# Patient Record
Sex: FEMALE | Race: WHITE | NOT HISPANIC OR LATINO | Employment: FULL TIME | URBAN - METROPOLITAN AREA
[De-identification: names, ages, dates, MRNs, and addresses within clinical notes are randomized per-mention and may not be internally consistent; named-entity substitution may affect disease eponyms.]

---

## 2017-01-10 ENCOUNTER — OFFICE VISIT (OUTPATIENT)
Dept: FAMILY MEDICINE CLINIC | Facility: CLINIC | Age: 47
End: 2017-01-10

## 2017-01-10 VITALS
BODY MASS INDEX: 21.97 KG/M2 | DIASTOLIC BLOOD PRESSURE: 80 MMHG | SYSTOLIC BLOOD PRESSURE: 130 MMHG | OXYGEN SATURATION: 100 % | HEIGHT: 63 IN | HEART RATE: 117 BPM | WEIGHT: 124 LBS

## 2017-01-10 DIAGNOSIS — R13.12 OROPHARYNGEAL DYSPHAGIA: ICD-10-CM

## 2017-01-10 DIAGNOSIS — M54.50 ACUTE RIGHT-SIDED LOW BACK PAIN WITHOUT SCIATICA: ICD-10-CM

## 2017-01-10 DIAGNOSIS — L63.9 ALOPECIA AREATA, UNSPECIFIED: Primary | ICD-10-CM

## 2017-01-10 DIAGNOSIS — G47.09 OTHER INSOMNIA: ICD-10-CM

## 2017-01-10 PROBLEM — R13.10 DIFFICULTY SWALLOWING: Status: ACTIVE | Noted: 2017-01-10

## 2017-01-10 PROCEDURE — 99214 OFFICE O/P EST MOD 30 MIN: CPT | Performed by: FAMILY MEDICINE

## 2017-01-10 NOTE — PROGRESS NOTES
Subjective   Betty Garg is a 46 y.o. female here to discuss insomnia and hair loss.    History of Present Illness   Betty was instructed by our office to contact dermatology in regards to hair loss. She followed up with Dr. Laurent last week where she was advised to get additional lab testing.    In regards to Benito's sleeping, she states she has trouble falling asleep and she wakes up throughout the night. Betty is concerned her stress levels is related to her insomnia.  She is also c/o pain in her upper back, right side. She thought it was a stone that passed  But she is still having occasional discomfort.    The following portions of the patient's history were reviewed and updated as appropriate: allergies, current medications, past medical history, past social history and problem list.    Review of Systems   Constitutional: Negative for activity change, chills, fatigue, fever and unexpected weight change.   HENT: Negative for congestion, sinus pressure, sore throat, tinnitus and trouble swallowing.    Eyes: Negative for discharge and visual disturbance.   Respiratory: Negative for cough, chest tightness, shortness of breath and wheezing.    Cardiovascular: Negative for chest pain, palpitations and leg swelling.   Gastrointestinal: Negative for abdominal distention, abdominal pain, constipation, diarrhea, nausea and vomiting.   Endocrine: Negative for cold intolerance, heat intolerance, polydipsia and polyuria.   Genitourinary: Negative for difficulty urinating, dysuria, frequency and urgency.   Musculoskeletal: Positive for back pain. Negative for arthralgias, gait problem and myalgias.   Skin: Negative for color change, rash and wound.   Neurological: Negative for dizziness, seizures, syncope, speech difficulty, weakness, light-headedness, numbness and headaches.   Hematological: Does not bruise/bleed easily.   Psychiatric/Behavioral: Positive for sleep disturbance. Negative for agitation, behavioral  problems, confusion, hallucinations, self-injury and suicidal ideas. The patient is not nervous/anxious.        Objective   Physical Exam   Constitutional: She is oriented to person, place, and time. She appears well-developed and well-nourished.   HENT:   Head: Normocephalic and atraumatic.   Right Ear: External ear normal.   Left Ear: External ear normal.   Nose: Nose normal.   Mouth/Throat: Oropharynx is clear and moist.   Eyes: Conjunctivae are normal. Pupils are equal, round, and reactive to light.   Neck: Normal range of motion. Neck supple. No JVD present. No tracheal deviation present. No thyromegaly present.   Cardiovascular: Normal rate, regular rhythm, normal heart sounds and intact distal pulses.  Exam reveals no gallop and no friction rub.    No murmur heard.  Pulmonary/Chest: Effort normal and breath sounds normal.   Abdominal: Soft. Bowel sounds are normal. She exhibits no distension. There is no tenderness.   Musculoskeletal: Normal range of motion. She exhibits no edema.   Lymphadenopathy:     She has no cervical adenopathy.   Neurological: She is alert and oriented to person, place, and time.   Skin: Skin is warm and dry. No rash noted.   Psychiatric: She has a normal mood and affect. Her behavior is normal. Judgment and thought content normal.   Nursing note and vitals reviewed.      Assessment/Plan   Betty was seen today for hair/scalp problem, back pain and insomnia.    Diagnoses and all orders for this visit:    Alopecia areata, unspecified  -     DHEA level  -     Testosterone, Free, Total  -     Thyroid Panel With TSH  -     Iron  -     Vitamin B12 and Folate  -     Ferritin  -     CBC and Differential  -     Reticulocytes  -     Vitamin D 25 Hydroxy  -     Zinc    Oropharyngeal dysphagia  She is still swallowing with difficulty. She has been evaluated at [facility] and no source was found.     Acute right-sided low back pain without sciatica  I have advised regular stretching and  aspirin cream to treat..    Other insomnia  I have advised waiting for these test results to come back and working on a regular sleep schedule.

## 2017-01-10 NOTE — MR AVS SNAPSHOT
Betty Garg   1/10/2017 1:55 PM   Office Visit    Provider:  Job Jackson MD   Department:  [facility] PRIMARY CARE   Dept Phone:  [phone]                Your Full Care Plan              Today's Medication Changes          These changes are accurate as of: 1/10/17  2:52 PM.  If you have any questions, ask your nurse or doctor.               Medication(s)that have changed:     PROBIOTIC DAILY PO   Take 1 tablet by mouth Daily.   What changed:  Another medication with the same name was removed. Continue taking this medication, and follow the directions you see here.   Changed by:  Job Jackson MD         Stop taking medication(s)listed here:     OMEPRAZOLE PO   Stopped by:  Job Jackson MD                      Your Updated Medication List          This list is accurate as of: 1/10/17  2:52 PM.  Always use your most recent med list.                aspirin 81 MG tablet       nebivolol 2.5 MG tablet   Commonly known as:  BYSTOLIC       PROBIOTIC DAILY PO               We Performed the Following     CBC and Differential     DHEA level     Ferritin     Iron     Reticulocytes     Testosterone, Free, Total     Thyroid Panel With TSH     Vitamin B12 and Folate     Vitamin D 25 Hydroxy     Zinc       You Were Diagnosed With        Codes Comments    Alopecia areata, unspecified    -  Primary ICD-10-CM: L63.9  ICD-9-CM: 704.01     Oropharyngeal dysphagia     ICD-10-CM: R13.12  ICD-9-CM: 787.22       Instructions    I will send your labs to Dr. Laurent.  Try some aspirin cream on the sore area of your back.      Patient Instructions History      [employer] Signup     [facility] [employer] allows you to send messages to your doctor, view your test results, renew your prescriptions, schedule appointments, and more. To sign up, go to [employer] and click on the Sign Up Now link in the New User? box. Enter your [employer] Activation Code exactly as it  "appears below along with the last four digits of your Social Security Number and your Date of Birth () to complete the sign-up process. If you do not sign up before the expiration date, you must request a new code.    [employer] [employer] Activation Code: [email or web address]  Expires: 2017  2:52 PM    If you have questions, you can email [email or web address] or call [phone] to talk to our [employer] staff. Remember, [employer] is NOT to be used for urgent needs. For medical emergencies, dial 911.               Other Info from Your Visit           Allergies     Penicillins  Hives      Reason for Visit     Hair/Scalp Problem Hair Loss.    Back Pain     Insomnia           Vital Signs     Blood Pressure Pulse Height Weight Oxygen Saturation Body Mass Index    130/80 (BP Location: Right arm, Patient Position: Sitting) 117 63\" (160 cm) 124 lb (56.2 kg) 100% 21.97 kg/m2    Smoking Status                   Never Smoker           Problems and Diagnoses Noted     Difficulty swallowing    Patchy loss of hair    -  Primary      "

## 2017-01-11 LAB
25(OH)D3+25(OH)D2 SERPL-MCNC: 25.6 NG/ML (ref 30–100)
BASOPHILS # BLD AUTO: 0 X10E3/UL (ref 0–0.2)
BASOPHILS NFR BLD AUTO: 0 %
EOSINOPHIL # BLD AUTO: 0.1 X10E3/UL (ref 0–0.4)
EOSINOPHIL NFR BLD AUTO: 1 %
ERYTHROCYTE [DISTWIDTH] IN BLOOD BY AUTOMATED COUNT: 12.8 % (ref 12.3–15.4)
FERRITIN SERPL-MCNC: 26 NG/ML (ref 15–150)
FOLATE SERPL-MCNC: >20 NG/ML
FT4I SERPL CALC-MCNC: 1.9 (ref 1.2–4.9)
HCT VFR BLD AUTO: 38.4 % (ref 34–46.6)
HGB BLD-MCNC: 13.2 G/DL (ref 11.1–15.9)
IMM GRANULOCYTES # BLD: 0 X10E3/UL (ref 0–0.1)
IMM GRANULOCYTES NFR BLD: 0 %
IRON SERPL-MCNC: 108 UG/DL (ref 27–159)
LYMPHOCYTES # BLD AUTO: 1.8 X10E3/UL (ref 0.7–3.1)
LYMPHOCYTES NFR BLD AUTO: 27 %
MCH RBC QN AUTO: 31.8 PG (ref 26.6–33)
MCHC RBC AUTO-ENTMCNC: 34.4 G/DL (ref 31.5–35.7)
MCV RBC AUTO: 93 FL (ref 79–97)
MONOCYTES # BLD AUTO: 0.5 X10E3/UL (ref 0.1–0.9)
MONOCYTES NFR BLD AUTO: 7 %
NEUTROPHILS # BLD AUTO: 4.4 X10E3/UL (ref 1.4–7)
NEUTROPHILS NFR BLD AUTO: 65 %
PLATELET # BLD AUTO: 307 X10E3/UL (ref 150–379)
RBC # BLD AUTO: 4.15 X10E6/UL (ref 3.77–5.28)
RETICS/RBC NFR AUTO: 1.2 % (ref 0.6–2.6)
T3RU NFR SERPL: 32 % (ref 24–39)
T4 SERPL-MCNC: 6 UG/DL (ref 4.5–12)
TSH SERPL DL<=0.005 MIU/L-ACNC: 2.54 UIU/ML (ref 0.45–4.5)
VIT B12 SERPL-MCNC: 528 PG/ML (ref 211–946)
WBC # BLD AUTO: 6.8 X10E3/UL (ref 3.4–10.8)
ZINC SERPL-MCNC: NORMAL UG/DL

## 2017-01-12 LAB
TESTOST FREE SERPL-MCNC: 3.1 PG/ML (ref 0–4.2)
TESTOST SERPL-MCNC: 21 NG/DL (ref 8–48)

## 2017-01-13 LAB — DHEA SERPL-MCNC: 468 NG/DL (ref 31–701)

## 2017-01-14 LAB — ZINC SERPL-MCNC: 67 UG/DL (ref 56–134)

## 2017-01-25 ENCOUNTER — OFFICE (OUTPATIENT)
Dept: URBAN - METROPOLITAN AREA CLINIC 75 | Facility: CLINIC | Age: 47
End: 2017-01-25

## 2017-01-25 VITALS
SYSTOLIC BLOOD PRESSURE: 120 MMHG | HEIGHT: 64 IN | DIASTOLIC BLOOD PRESSURE: 72 MMHG | WEIGHT: 124 LBS | HEART RATE: 84 BPM

## 2017-01-25 DIAGNOSIS — K21.9 GASTRO-ESOPHAGEAL REFLUX DISEASE WITHOUT ESOPHAGITIS: ICD-10-CM

## 2017-01-25 DIAGNOSIS — R07.9 CHEST PAIN, UNSPECIFIED: ICD-10-CM

## 2017-01-25 DIAGNOSIS — K58.9 IRRITABLE BOWEL SYNDROME WITHOUT DIARRHEA: ICD-10-CM

## 2017-01-25 DIAGNOSIS — K22.4 DYSKINESIA OF ESOPHAGUS: ICD-10-CM

## 2017-01-25 DIAGNOSIS — Z83.71 FAMILY HISTORY OF COLONIC POLYPS: ICD-10-CM

## 2017-01-25 DIAGNOSIS — L65.9 NONSCARRING HAIR LOSS, UNSPECIFIED: ICD-10-CM

## 2017-01-25 DIAGNOSIS — R13.10 DYSPHAGIA, UNSPECIFIED: ICD-10-CM

## 2017-01-25 PROCEDURE — 99214 OFFICE O/P EST MOD 30 MIN: CPT | Performed by: INTERNAL MEDICINE

## 2017-01-25 RX ORDER — SUCRALFATE 1 G/10ML
SUSPENSION ORAL
Qty: 560 | Refills: 1 | Status: COMPLETED
End: 2018-01-17

## 2017-01-25 RX ORDER — HYOSCYAMINE SULFATE 0.12 MG/1
TABLET ORAL; SUBLINGUAL
Qty: 60 | Refills: 11 | Status: COMPLETED
Start: 2017-01-25 | End: 2018-01-17

## 2017-03-14 ENCOUNTER — TELEPHONE (OUTPATIENT)
Dept: FAMILY MEDICINE CLINIC | Facility: CLINIC | Age: 47
End: 2017-03-14

## 2017-03-14 NOTE — TELEPHONE ENCOUNTER
Betty left a VM stating she F/U as directed with dermatology as directed by our office for hair loss. Dermatology determined she was Vit.D deficient; her level was 25.6 [L] - she has been taking Vit.D 2000IU for 6wks. with no improvement. Dermatology didn't give her any recommendations or cause of her hair loss. Patient wants to know if you have any recommendations. Please advise.

## 2017-03-14 NOTE — TELEPHONE ENCOUNTER
I really don't except that I doubt that Vit D deficiency of that level would be the problem.But if it is the problem, it will take several months before she will see any improvement. I would encourage her to try over the counter products that contain Rogaine to see if that will help.

## 2017-03-14 NOTE — TELEPHONE ENCOUNTER
Patient informed with details below. I discussed in length with her that we cannot guarantee that her symptoms would improve in 6MO. Patient verbalized understanding.

## 2017-05-25 ENCOUNTER — OFFICE (OUTPATIENT)
Dept: URBAN - METROPOLITAN AREA CLINIC 75 | Facility: CLINIC | Age: 47
End: 2017-05-25

## 2017-05-25 VITALS
HEIGHT: 64 IN | SYSTOLIC BLOOD PRESSURE: 122 MMHG | HEART RATE: 92 BPM | DIASTOLIC BLOOD PRESSURE: 66 MMHG | WEIGHT: 123 LBS

## 2017-05-25 DIAGNOSIS — K58.9 IRRITABLE BOWEL SYNDROME WITHOUT DIARRHEA: ICD-10-CM

## 2017-05-25 DIAGNOSIS — R13.10 DYSPHAGIA, UNSPECIFIED: ICD-10-CM

## 2017-05-25 DIAGNOSIS — R07.9 CHEST PAIN, UNSPECIFIED: ICD-10-CM

## 2017-05-25 DIAGNOSIS — K21.9 GASTRO-ESOPHAGEAL REFLUX DISEASE WITHOUT ESOPHAGITIS: ICD-10-CM

## 2017-05-25 DIAGNOSIS — Z83.71 FAMILY HISTORY OF COLONIC POLYPS: ICD-10-CM

## 2017-05-25 DIAGNOSIS — K22.4 DYSKINESIA OF ESOPHAGUS: ICD-10-CM

## 2017-05-25 PROCEDURE — 99214 OFFICE O/P EST MOD 30 MIN: CPT | Performed by: INTERNAL MEDICINE

## 2017-07-24 DIAGNOSIS — Z00.00 ROUTINE GENERAL MEDICAL EXAMINATION AT A HEALTH CARE FACILITY: Primary | ICD-10-CM

## 2017-07-24 DIAGNOSIS — Z13.220 SCREENING FOR LIPOID DISORDERS: ICD-10-CM

## 2017-07-31 LAB
ALBUMIN SERPL-MCNC: 4.2 G/DL (ref 3.5–5.2)
ALBUMIN/GLOB SERPL: 1.7 G/DL
ALP SERPL-CCNC: 54 U/L (ref 39–117)
ALT SERPL-CCNC: 14 U/L (ref 1–33)
AST SERPL-CCNC: 13 U/L (ref 1–32)
BILIRUB SERPL-MCNC: 0.4 MG/DL (ref 0.1–1.2)
BUN SERPL-MCNC: 8 MG/DL (ref 6–20)
BUN/CREAT SERPL: 11.1 (ref 7–25)
CALCIUM SERPL-MCNC: 9.2 MG/DL (ref 8.6–10.5)
CHLORIDE SERPL-SCNC: 104 MMOL/L (ref 98–107)
CHOLEST SERPL-MCNC: 130 MG/DL (ref 0–200)
CO2 SERPL-SCNC: 22.8 MMOL/L (ref 22–29)
CREAT SERPL-MCNC: 0.72 MG/DL (ref 0.57–1)
ERYTHROCYTE [DISTWIDTH] IN BLOOD BY AUTOMATED COUNT: 12.2 % (ref 11.7–13)
GLOBULIN SER CALC-MCNC: 2.5 GM/DL
GLUCOSE SERPL-MCNC: 105 MG/DL (ref 65–99)
HCT VFR BLD AUTO: 39 % (ref 35.6–45.5)
HDLC SERPL-MCNC: 50 MG/DL (ref 40–60)
HGB BLD-MCNC: 13.3 G/DL (ref 11.9–15.5)
LDLC SERPL CALC-MCNC: 71 MG/DL (ref 0–100)
LDLC/HDLC SERPL: 1.42 {RATIO}
MCH RBC QN AUTO: 33 PG (ref 26.9–32)
MCHC RBC AUTO-ENTMCNC: 34.1 G/DL (ref 32.4–36.3)
MCV RBC AUTO: 96.8 FL (ref 80.5–98.2)
PLATELET # BLD AUTO: 281 10*3/MM3 (ref 140–500)
POTASSIUM SERPL-SCNC: 4.1 MMOL/L (ref 3.5–5.2)
PROT SERPL-MCNC: 6.7 G/DL (ref 6–8.5)
RBC # BLD AUTO: 4.03 10*6/MM3 (ref 3.9–5.2)
SODIUM SERPL-SCNC: 141 MMOL/L (ref 136–145)
TRIGL SERPL-MCNC: 44 MG/DL (ref 0–150)
VLDLC SERPL CALC-MCNC: 8.8 MG/DL (ref 5–40)
WBC # BLD AUTO: 4.62 10*3/MM3 (ref 4.5–10.7)

## 2017-08-03 ENCOUNTER — OFFICE VISIT (OUTPATIENT)
Dept: FAMILY MEDICINE CLINIC | Facility: CLINIC | Age: 47
End: 2017-08-03

## 2017-08-03 VITALS
HEART RATE: 78 BPM | HEIGHT: 63 IN | SYSTOLIC BLOOD PRESSURE: 115 MMHG | BODY MASS INDEX: 22.15 KG/M2 | OXYGEN SATURATION: 98 % | WEIGHT: 125 LBS | DIASTOLIC BLOOD PRESSURE: 70 MMHG

## 2017-08-03 DIAGNOSIS — Z00.00 ROUTINE GENERAL MEDICAL EXAMINATION AT A HEALTH CARE FACILITY: Primary | ICD-10-CM

## 2017-08-03 DIAGNOSIS — R13.11 ORAL PHASE DYSPHAGIA: ICD-10-CM

## 2017-08-03 PROCEDURE — 99396 PREV VISIT EST AGE 40-64: CPT | Performed by: FAMILY MEDICINE

## 2017-08-03 RX ORDER — MELATONIN
5000 DAILY
COMMUNITY
End: 2021-06-16 | Stop reason: SDUPTHER

## 2017-08-03 RX ORDER — METRONIDAZOLE 500 MG/1
TABLET ORAL
COMMUNITY
Start: 2017-07-28 | End: 2018-07-12

## 2017-08-03 RX ORDER — CIPROFLOXACIN 500 MG/1
TABLET, FILM COATED ORAL
COMMUNITY
Start: 2017-07-28 | End: 2018-07-12

## 2017-08-03 RX ORDER — ANTIARTHRITIC COMBINATION NO.2 900 MG
1 TABLET ORAL DAILY
COMMUNITY
End: 2019-08-01

## 2017-08-03 NOTE — PROGRESS NOTES
"Preventive Exam    History of Present Illness: Betty is here for check up and review of routine health maintenance. She states she is doing well and has concerns with hair loss.  She has been fully evaluated by a  Dermatology and was not dxd with any condition that causes this.    REVIEW OF SYSTEMS  Constitutional: Negative.    HENT: Negative.    Eyes: Negative.    Respiratory: Negative.    Cardiovascular: Negative.    Gastrointestinal: Negative.    Endocrine: Negative.    Genitourinary: Negative.    Musculoskeletal: Negative.  Skin: Negative.    Allergic/Immunologic: Negative.    Neurological: Negative.    Hematological: Negative.    Psychiatric/Behavioral: Negative.    All other systems reviewed and are negative.          PHYSICAL EXAM    Vitals:    08/03/17 1345   BP: 115/70   BP Location: Right arm   Patient Position: Sitting   Pulse: 78   SpO2: 98%   Weight: 125 lb (56.7 kg)   Height: 63\" (160 cm)     GENERAL: alert and oriented, afebrile and vital signs stable  HEENT: oral mucosa moist, PEERLA, EOM, conjunctiva normal  No cervical adenopathy  LUNGS: clear to ascultation bilaterally, no rales, ronchi or wheezing  HEART: RRR S1 S2 without murmers, thrills, rubs or gallops  CHEST WALL: within normal limits, no tenderness  ABDOMEN: WNL. Normal BS.  EXTREMITIES: No clubbing, cyanosis or edema noted. Normal Pulses.  SKIN: warm, dry, no rashes noted  NEURO: CN II- XII grossly intact    ASSESSMENT AND PLAN  Problem List Items Addressed This Visit        Digestive    Difficulty swallowing  Improved but still occasionally a problem.      Other Visit Diagnoses     Routine general medical examination at a health care facility    -  Primary        Routine health maintenance reviewed and discussed with Betty.  Labs reviewed and on chart.  I have advised her that hair loss is often a condition that is caused by stress and she has had some difficult health issues that are most likely the source of this.   .No orders of " the defined types were placed in this encounter.    Return in about 1 year (around 8/3/2018) for Annual physical.

## 2017-08-03 NOTE — PATIENT INSTRUCTIONS
Annual Wellness  Personal Prevention Plan of Service     Date of Office Visit:  2017  Encounter Provider:  Job Jackson MD  Place of Service:  [facility] PRIMARY CARE  Patient Name: Betty Garg  :  1970    As part of the Annual Wellness portion of your visit today, we are providing you with this personalized preventive plan of services (PPPS). This plan is based upon recommendations of the [place] Preventive Services Task Force (USPSTF) and the Advisory Committee on Immunization Practices (ACIP).    This lists the preventive care services that should be considered, and provides dates of when you are due. Items listed as completed are up-to-date and do not require any further intervention.    Health Maintenance   Topic Date Due   • INFLUENZA VACCINE  2017   • PAP SMEAR  2019   • TDAP/TD VACCINES (2 - Td) 2025       No orders of the defined types were placed in this encounter.      Return in about 1 year (around 8/3/2018) for Annual physical.

## 2017-08-30 ENCOUNTER — OFFICE (OUTPATIENT)
Dept: URBAN - METROPOLITAN AREA CLINIC 75 | Facility: CLINIC | Age: 47
End: 2017-08-30

## 2017-08-30 VITALS — SYSTOLIC BLOOD PRESSURE: 112 MMHG | DIASTOLIC BLOOD PRESSURE: 66 MMHG | HEIGHT: 64 IN | WEIGHT: 122 LBS

## 2017-08-30 DIAGNOSIS — K21.9 GASTRO-ESOPHAGEAL REFLUX DISEASE WITHOUT ESOPHAGITIS: ICD-10-CM

## 2017-08-30 DIAGNOSIS — R19.4 CHANGE IN BOWEL HABIT: ICD-10-CM

## 2017-08-30 DIAGNOSIS — K22.4 DYSKINESIA OF ESOPHAGUS: ICD-10-CM

## 2017-08-30 DIAGNOSIS — R07.9 CHEST PAIN, UNSPECIFIED: ICD-10-CM

## 2017-08-30 DIAGNOSIS — Z83.71 FAMILY HISTORY OF COLONIC POLYPS: ICD-10-CM

## 2017-08-30 DIAGNOSIS — K58.9 IRRITABLE BOWEL SYNDROME WITHOUT DIARRHEA: ICD-10-CM

## 2017-08-30 PROCEDURE — 99214 OFFICE O/P EST MOD 30 MIN: CPT | Performed by: INTERNAL MEDICINE

## 2017-11-15 ENCOUNTER — CLINICAL SUPPORT (OUTPATIENT)
Dept: FAMILY MEDICINE CLINIC | Facility: CLINIC | Age: 47
End: 2017-11-15

## 2017-11-15 DIAGNOSIS — Z23 NEED FOR VACCINATION: Primary | ICD-10-CM

## 2017-11-15 PROCEDURE — 90471 IMMUNIZATION ADMIN: CPT | Performed by: FAMILY MEDICINE

## 2017-11-15 PROCEDURE — 90686 IIV4 VACC NO PRSV 0.5 ML IM: CPT | Performed by: FAMILY MEDICINE

## 2018-01-17 ENCOUNTER — OFFICE (OUTPATIENT)
Dept: URBAN - METROPOLITAN AREA CLINIC 75 | Facility: CLINIC | Age: 48
End: 2018-01-17

## 2018-01-17 VITALS
HEIGHT: 64 IN | HEART RATE: 74 BPM | WEIGHT: 125 LBS | DIASTOLIC BLOOD PRESSURE: 74 MMHG | SYSTOLIC BLOOD PRESSURE: 122 MMHG

## 2018-01-17 DIAGNOSIS — K21.9 GASTRO-ESOPHAGEAL REFLUX DISEASE WITHOUT ESOPHAGITIS: ICD-10-CM

## 2018-01-17 DIAGNOSIS — K57.92 DIVERTICULITIS OF INTESTINE, PART UNSPECIFIED, WITHOUT PERFO: ICD-10-CM

## 2018-01-17 DIAGNOSIS — K58.9 IRRITABLE BOWEL SYNDROME WITHOUT DIARRHEA: ICD-10-CM

## 2018-01-17 PROCEDURE — 99214 OFFICE O/P EST MOD 30 MIN: CPT | Performed by: INTERNAL MEDICINE

## 2018-01-17 RX ORDER — METRONIDAZOLE 500 MG/1
TABLET, FILM COATED ORAL
Qty: 21 | Refills: 0 | Status: COMPLETED
Start: 2017-07-28 | End: 2018-08-16

## 2018-01-17 RX ORDER — CIPROFLOXACIN 500 MG/1
1000 TABLET, FILM COATED ORAL
Qty: 14 | Refills: 0 | Status: COMPLETED
Start: 2017-07-28 | End: 2018-08-16

## 2018-07-12 ENCOUNTER — OFFICE VISIT (OUTPATIENT)
Dept: FAMILY MEDICINE CLINIC | Facility: CLINIC | Age: 48
End: 2018-07-12

## 2018-07-12 VITALS
BODY MASS INDEX: 22.5 KG/M2 | HEART RATE: 84 BPM | WEIGHT: 127 LBS | OXYGEN SATURATION: 99 % | DIASTOLIC BLOOD PRESSURE: 62 MMHG | SYSTOLIC BLOOD PRESSURE: 92 MMHG | HEIGHT: 63 IN

## 2018-07-12 DIAGNOSIS — G89.29 CHRONIC LEFT SI JOINT PAIN: Primary | ICD-10-CM

## 2018-07-12 DIAGNOSIS — M53.3 CHRONIC LEFT SI JOINT PAIN: Primary | ICD-10-CM

## 2018-07-12 PROCEDURE — 99213 OFFICE O/P EST LOW 20 MIN: CPT | Performed by: NURSE PRACTITIONER

## 2018-07-12 RX ORDER — BACLOFEN 10 MG/1
10 TABLET ORAL 3 TIMES DAILY
Qty: 30 TABLET | Refills: 0 | Status: SHIPPED | OUTPATIENT
Start: 2018-07-12 | End: 2018-08-21

## 2018-07-12 NOTE — PROGRESS NOTES
"Betty Garg is a 47 y.o. female.Patient states that she has had intermittent lower left back pain for 4 months. Pain decrease with movement. Pain is dull. She has tried Ibuprofen and a heating pad with no relief. NKI. In the last 6 months she has noticed an increase in her spine cracking.       Assessment/Plan   Problem List Items Addressed This Visit     None      Visit Diagnoses     Chronic left SI joint pain    -  Primary             No Follow-up on file.  There are no Patient Instructions on file for this visit.    Chief Complaint   Patient presents with   • Back Pain     Social History   Substance Use Topics   • Smoking status: Never Smoker   • Smokeless tobacco: Never Used   • Alcohol use Yes      Comment: Socially.       History of Present Illness     The following portions of the patient's history were reviewed and updated as appropriate:PMHroutine: Social history , Allergies, Current Medications, Active Problem List and Health Maintenance    Review of Systems   Musculoskeletal: Positive for back pain.       Objective   Vitals:    07/12/18 0752   BP: 92/62   Pulse: 84   SpO2: 99%   Weight: 57.6 kg (127 lb)   Height: 160 cm (63\")     Body mass index is 22.5 kg/m².  Physical Exam   Constitutional: She appears well-developed and well-nourished. No distress.   HENT:   Head: Normocephalic and atraumatic.   Musculoskeletal: Normal range of motion. She exhibits tenderness.   Tenderness to left SI joint   Neurological: No cranial nerve deficit or sensory deficit. GCS eye subscore is 4. GCS verbal subscore is 5. GCS motor subscore is 6.   Reflex Scores:       Tricep reflexes are 2+ on the right side and 2+ on the left side.       Bicep reflexes are 2+ on the right side and 2+ on the left side.       Brachioradialis reflexes are 2+ on the right side and 2+ on the left side.       Patellar reflexes are 2+ on the right side and 2+ on the left side.       Achilles reflexes are 2+ on the right side and 2+ on the left " side.  Skin: Skin is warm.   Nursing note and vitals reviewed.    Reviewed Data:  No visits with results within 1 Month(s) from this visit.   Latest known visit with results is:   Orders Only on 07/24/2017   Component Date Value Ref Range Status   • Total Cholesterol 07/31/2017 130  0 - 200 mg/dL Final   • Triglycerides 07/31/2017 44  0 - 150 mg/dL Final   • HDL Cholesterol 07/31/2017 50  40 - 60 mg/dL Final   • VLDL Cholesterol 07/31/2017 8.8  5 - 40 mg/dL Final   • LDL Cholesterol  07/31/2017 71  0 - 100 mg/dL Final   • LDL/HDL Ratio 07/31/2017 1.42   Final   • Glucose 07/31/2017 105* 65 - 99 mg/dL Final   • BUN 07/31/2017 8  6 - 20 mg/dL Final   • Creatinine 07/31/2017 0.72  0.57 - 1.00 mg/dL Final   • eGFR Non  Am 07/31/2017 87  >60 mL/min/1.73 Final   • eGFR African Am 07/31/2017 106  >60 mL/min/1.73 Final   • BUN/Creatinine Ratio 07/31/2017 11.1  7.0 - 25.0 Final   • Sodium 07/31/2017 141  136 - 145 mmol/L Final   • Potassium 07/31/2017 4.1  3.5 - 5.2 mmol/L Final   • Chloride 07/31/2017 104  98 - 107 mmol/L Final   • Total CO2 07/31/2017 22.8  22.0 - 29.0 mmol/L Final   • Calcium 07/31/2017 9.2  8.6 - 10.5 mg/dL Final   • Total Protein 07/31/2017 6.7  6.0 - 8.5 g/dL Final   • Albumin 07/31/2017 4.20  3.50 - 5.20 g/dL Final   • Globulin 07/31/2017 2.5  gm/dL Final   • A/G Ratio 07/31/2017 1.7  g/dL Final   • Total Bilirubin 07/31/2017 0.4  0.1 - 1.2 mg/dL Final   • Alkaline Phosphatase 07/31/2017 54  39 - 117 U/L Final   • AST (SGOT) 07/31/2017 13  1 - 32 U/L Final   • ALT (SGPT) 07/31/2017 14  1 - 33 U/L Final   • WBC 07/31/2017 4.62  4.50 - 10.70 10*3/mm3 Final   • RBC 07/31/2017 4.03  3.90 - 5.20 10*6/mm3 Final   • Hemoglobin 07/31/2017 13.3  11.9 - 15.5 g/dL Final   • Hematocrit 07/31/2017 39.0  35.6 - 45.5 % Final   • MCV 07/31/2017 96.8  80.5 - 98.2 fL Final   • MCH 07/31/2017 33.0* 26.9 - 32.0 pg Final   • MCHC 07/31/2017 34.1  32.4 - 36.3 g/dL Final   • RDW 07/31/2017 12.2  11.7 - 13.0 % Final    • Platelets 07/31/2017 281  140 - 500 10*3/mm3 Final     Return for follow up with Dr. Jackson in a month.

## 2018-08-16 ENCOUNTER — OFFICE (OUTPATIENT)
Dept: URBAN - METROPOLITAN AREA CLINIC 75 | Facility: CLINIC | Age: 48
End: 2018-08-16
Payer: COMMERCIAL

## 2018-08-16 VITALS
SYSTOLIC BLOOD PRESSURE: 106 MMHG | HEART RATE: 72 BPM | RESPIRATION RATE: 14 BRPM | DIASTOLIC BLOOD PRESSURE: 64 MMHG | WEIGHT: 129 LBS | HEIGHT: 64 IN

## 2018-08-16 DIAGNOSIS — R10.32 LEFT LOWER QUADRANT PAIN: ICD-10-CM

## 2018-08-16 PROCEDURE — 99214 OFFICE O/P EST MOD 30 MIN: CPT

## 2018-08-16 RX ORDER — FAMOTIDINE 40 MG/1
TABLET, FILM COATED ORAL
Qty: 30 | Refills: 10 | Status: COMPLETED
Start: 2018-08-16 | End: 2019-08-29

## 2018-08-21 ENCOUNTER — OFFICE VISIT (OUTPATIENT)
Dept: FAMILY MEDICINE CLINIC | Facility: CLINIC | Age: 48
End: 2018-08-21

## 2018-08-21 VITALS
BODY MASS INDEX: 22.68 KG/M2 | DIASTOLIC BLOOD PRESSURE: 68 MMHG | OXYGEN SATURATION: 99 % | HEART RATE: 68 BPM | HEIGHT: 63 IN | SYSTOLIC BLOOD PRESSURE: 100 MMHG | WEIGHT: 128 LBS

## 2018-08-21 DIAGNOSIS — Z00.00 PHYSICAL EXAM: Primary | ICD-10-CM

## 2018-08-21 DIAGNOSIS — M53.3 CHRONIC SI JOINT PAIN: ICD-10-CM

## 2018-08-21 DIAGNOSIS — G89.29 CHRONIC SI JOINT PAIN: ICD-10-CM

## 2018-08-21 PROCEDURE — 99396 PREV VISIT EST AGE 40-64: CPT | Performed by: NURSE PRACTITIONER

## 2018-08-21 RX ORDER — ROSUVASTATIN CALCIUM 10 MG/1
TABLET, COATED ORAL
COMMUNITY
Start: 2018-08-06 | End: 2019-02-04 | Stop reason: DRUGHIGH

## 2018-08-21 NOTE — PROGRESS NOTES
Subjective   Betty Garg is a 48 y.o. female.   Here for her annual physical  PMHX:FHX of heart disease has mild plaque followed by Dr Jiang, has had bouts A fib  Hair loss and is followed by Dr Latham normal thyroid but is supposed to be taking synthroid  PSHX: colon resection  PFHX:reviewed in chart and with pt.  Was seen about 6 weeks ago for left SI joint pain and pt was ordered but the order never went through. Will reorder today      History of Present Illness none    The following portions of the patient's history were reviewed and updated as appropriate: allergies, current medications, past family history, past medical history, past social history, past surgical history and problem list.    Review of Systems   Constitutional: Positive for fatigue. Negative for activity change and appetite change.   Gastrointestinal: Positive for abdominal pain.       Objective   Physical Exam   Constitutional: She is oriented to person, place, and time. She appears well-developed and well-nourished. No distress.   HENT:   Head: Normocephalic and atraumatic.   Right Ear: External ear normal.   Left Ear: External ear normal.   Mouth/Throat: Oropharynx is clear and moist.   Eyes: EOM are normal.   Neck: Neck supple. No thyromegaly present.   Cardiovascular: Normal rate and regular rhythm.    Pulmonary/Chest: Effort normal and breath sounds normal.   Abdominal: Soft. Bowel sounds are normal.   Musculoskeletal: Normal range of motion.   Neurological: She is alert and oriented to person, place, and time. She displays normal reflexes.   Nursing note and vitals reviewed.        Assessment/Plan   Betty was seen today for annual exam.    Diagnoses and all orders for this visit:    Physical exam  -     Comprehensive Metabolic Panel  -     Lipid Panel With / Chol / HDL Ratio    Chronic SI joint pain  -     Ambulatory Referral to Physical Therapy Evaluate and treat

## 2018-08-22 LAB
ALBUMIN SERPL-MCNC: 4.7 G/DL (ref 3.5–5.5)
ALBUMIN/GLOB SERPL: 2 {RATIO} (ref 1.2–2.2)
ALP SERPL-CCNC: 55 IU/L (ref 39–117)
ALT SERPL-CCNC: 31 IU/L (ref 0–32)
AST SERPL-CCNC: 26 IU/L (ref 0–40)
BILIRUB SERPL-MCNC: 0.5 MG/DL (ref 0–1.2)
BUN SERPL-MCNC: 10 MG/DL (ref 6–24)
BUN/CREAT SERPL: 15 (ref 9–23)
CALCIUM SERPL-MCNC: 9.4 MG/DL (ref 8.7–10.2)
CHLORIDE SERPL-SCNC: 101 MMOL/L (ref 96–106)
CHOLEST SERPL-MCNC: 119 MG/DL (ref 100–199)
CHOLEST/HDLC SERPL: 2 RATIO (ref 0–4.4)
CO2 SERPL-SCNC: 21 MMOL/L (ref 20–29)
CREAT SERPL-MCNC: 0.67 MG/DL (ref 0.57–1)
GLOBULIN SER CALC-MCNC: 2.3 G/DL (ref 1.5–4.5)
GLUCOSE SERPL-MCNC: 89 MG/DL (ref 65–99)
HDLC SERPL-MCNC: 60 MG/DL
LDLC SERPL CALC-MCNC: 48 MG/DL (ref 0–99)
POTASSIUM SERPL-SCNC: 3.9 MMOL/L (ref 3.5–5.2)
PROT SERPL-MCNC: 7 G/DL (ref 6–8.5)
SODIUM SERPL-SCNC: 140 MMOL/L (ref 134–144)
TRIGL SERPL-MCNC: 56 MG/DL (ref 0–149)
VLDLC SERPL CALC-MCNC: 11 MG/DL (ref 5–40)

## 2018-11-29 ENCOUNTER — OFFICE (OUTPATIENT)
Dept: URBAN - METROPOLITAN AREA CLINIC 75 | Facility: CLINIC | Age: 48
End: 2018-11-29

## 2018-11-29 VITALS
HEIGHT: 64 IN | SYSTOLIC BLOOD PRESSURE: 128 MMHG | DIASTOLIC BLOOD PRESSURE: 70 MMHG | WEIGHT: 126 LBS | HEART RATE: 72 BPM

## 2018-11-29 DIAGNOSIS — Z83.71 FAMILY HISTORY OF COLONIC POLYPS: ICD-10-CM

## 2018-11-29 DIAGNOSIS — K59.00 CONSTIPATION, UNSPECIFIED: ICD-10-CM

## 2018-11-29 DIAGNOSIS — R10.32 LEFT LOWER QUADRANT PAIN: ICD-10-CM

## 2018-11-29 PROCEDURE — 99214 OFFICE O/P EST MOD 30 MIN: CPT | Performed by: INTERNAL MEDICINE

## 2018-11-29 RX ORDER — HYOSCYAMINE SULFATE 0.12 MG/1
TABLET ORAL
Qty: 60 | Refills: 10 | Status: COMPLETED
Start: 2018-11-29 | End: 2019-08-29

## 2018-12-20 ENCOUNTER — OFFICE VISIT (OUTPATIENT)
Dept: FAMILY MEDICINE CLINIC | Facility: CLINIC | Age: 48
End: 2018-12-20

## 2018-12-20 VITALS
DIASTOLIC BLOOD PRESSURE: 70 MMHG | OXYGEN SATURATION: 99 % | HEIGHT: 63 IN | HEART RATE: 90 BPM | BODY MASS INDEX: 22.15 KG/M2 | RESPIRATION RATE: 16 BRPM | SYSTOLIC BLOOD PRESSURE: 108 MMHG | WEIGHT: 125 LBS

## 2018-12-20 DIAGNOSIS — Z23 NEED FOR VACCINATION: ICD-10-CM

## 2018-12-20 DIAGNOSIS — G89.29 HIP PAIN, CHRONIC, LEFT: Primary | ICD-10-CM

## 2018-12-20 DIAGNOSIS — M25.552 HIP PAIN, CHRONIC, LEFT: Primary | ICD-10-CM

## 2018-12-20 PROCEDURE — 90632 HEPA VACCINE ADULT IM: CPT | Performed by: FAMILY MEDICINE

## 2018-12-20 PROCEDURE — 90471 IMMUNIZATION ADMIN: CPT | Performed by: FAMILY MEDICINE

## 2018-12-20 PROCEDURE — 99213 OFFICE O/P EST LOW 20 MIN: CPT | Performed by: FAMILY MEDICINE

## 2018-12-20 NOTE — PROGRESS NOTES
Subjective   Betty Garg is a 48 y.o. female.     History of Present Illness   Betty is here today with c/c of LT side hip pain. Patient states that this has been going on for about 8 months and worse within the last month. Tried physical therapy that made it worse at times. Heating pad with no relief. Tried abstaining from exercise with no relief.   It does not hurt at night when she sleeps. It hurts only when she walks    The following portions of the patient's history were reviewed and updated as appropriate: allergies, current medications, past medical history, past social history, past surgical history and problem list.    Review of Systems   Constitutional: Negative for activity change, appetite change, chills, fatigue, fever and unexpected weight change.   HENT: Negative for congestion, ear pain, hearing loss, nosebleeds, rhinorrhea and sore throat.    Eyes: Negative for pain, redness and visual disturbance.   Respiratory: Negative for cough, shortness of breath and wheezing.    Cardiovascular: Negative for chest pain, palpitations and leg swelling.   Gastrointestinal: Negative for abdominal pain, blood in stool, constipation, diarrhea, nausea and vomiting.   Endocrine: Negative for cold intolerance and heat intolerance.   Genitourinary: Negative for difficulty urinating, dysuria, frequency, hematuria, pelvic pain, urgency and vaginal discharge.   Musculoskeletal: Negative for arthralgias, back pain and joint swelling.   Skin: Negative for rash and wound.   Neurological: Negative for dizziness, weakness, numbness and headaches.   Hematological: Does not bruise/bleed easily.   Psychiatric/Behavioral: Negative for dysphoric mood, sleep disturbance and suicidal ideas. The patient is not nervous/anxious.        Objective   Physical Exam   Constitutional: She appears well-developed.   Cardiovascular: Normal rate.   Pulmonary/Chest: Effort normal.   Musculoskeletal:   Pain with elevation and ambulation of  left hip into hip.   Nursing note and vitals reviewed.      Assessment/Plan   Betty was seen today for hip pain.    Diagnoses and all orders for this visit:    Hip pain, chronic, left  She has been to PT and reviewed with surgeon. This is causing pain with ambulation. I have referred her to ortho for evaluation.  -     Ambulatory Referral to Orthopedic Surgery    Need for vaccination  -     Hepatitis A Vaccine Adult IM

## 2019-02-04 ENCOUNTER — OFFICE VISIT (OUTPATIENT)
Dept: ORTHOPEDIC SURGERY | Facility: CLINIC | Age: 49
End: 2019-02-04

## 2019-02-04 VITALS — BODY MASS INDEX: 22.57 KG/M2 | TEMPERATURE: 99.6 F | HEIGHT: 63 IN | WEIGHT: 127.4 LBS

## 2019-02-04 DIAGNOSIS — G89.29 CHRONIC LEFT HIP PAIN: Primary | ICD-10-CM

## 2019-02-04 DIAGNOSIS — M25.552 CHRONIC LEFT HIP PAIN: Primary | ICD-10-CM

## 2019-02-04 PROCEDURE — 73502 X-RAY EXAM HIP UNI 2-3 VIEWS: CPT | Performed by: ORTHOPAEDIC SURGERY

## 2019-02-04 PROCEDURE — 99243 OFF/OP CNSLTJ NEW/EST LOW 30: CPT | Performed by: ORTHOPAEDIC SURGERY

## 2019-02-04 RX ORDER — FAMOTIDINE 40 MG/1
40 TABLET, FILM COATED ORAL AS NEEDED
COMMUNITY
Start: 2019-01-28 | End: 2019-08-01

## 2019-02-04 RX ORDER — ROSUVASTATIN CALCIUM 20 MG/1
20 TABLET, COATED ORAL DAILY
COMMUNITY
Start: 2019-01-28

## 2019-02-04 NOTE — PROGRESS NOTES
Patient Name: Betty Garg   YOB: 1970  Referring Primary Care Physician: Job Jackson MD  BMI: Body mass index is 22.57 kg/m².    Chief Complaint:    Chief Complaint   Patient presents with   • Left Hip - Establish Care, Pain        Subjective:    HPI:   Betty Garg is a pleasant 48 y.o. year old who presents today for evaluation of   Chief Complaint   Patient presents with   • Left Hip - Establish Care, Pain    (Location). Duration: This has been going on for months. Timing: The pain is persistent. Context or causative factors: unknown, had diverticulitis and colon ressection last March.  Really bothering her around that time. Achy pain that is deep around SI joint.  No radiation.  Denies groin pain.  Did PT at [facility] and they worked on hip and SI.  No meds.  Sent here to r /o orthopedic condition  .  Relieving Factors:  In the past has tried physical therapy.     This problem is new to this examiner.     Medications:   Home Medications:  Current Outpatient Medications on File Prior to Visit   Medication Sig   • aspirin 81 MG tablet Take 81 mg by mouth daily. oral delayed release tablet   • Biotin 5000 MCG tablet Take 1 tablet by mouth Daily.   • cholecalciferol (VITAMIN D3) 1000 UNITS tablet Take 2,000 Units by mouth Daily.   • famotidine (PEPCID) 40 MG tablet Take 40 mg by mouth As Needed.   • Multiple Vitamin (MULTI-DAY PO) Take  by mouth.   • nebivolol (BYSTOLIC) 2.5 MG tablet Take 2.5 mg by mouth daily.   • Probiotic Product (PROBIOTIC DAILY PO) Take 1 tablet by mouth Daily.   • rosuvastatin (CRESTOR) 20 MG tablet Take 20 mg by mouth Daily.   • Wheat Dextrin (BENEFIBER DRINK MIX PO) Take  by mouth.   • [DISCONTINUED] rosuvastatin (CRESTOR) 10 MG tablet      No current facility-administered medications on file prior to visit.      Current Medications:  Scheduled Meds:  Continuous Infusions:  No current facility-administered medications for this visit.   PRN Meds:.    I have reviewed  the patient's medical history in detail and updated the computerized patient record.  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   • Drug use: No   • Sexual activity: Yes     Partners: Male      Social History     Social History Narrative    Lives at home with  and children        Family History   Problem Relation Age of Onset   • Hypothyroidism Mother    • Heart disease Father    • Hypertension Father    • Hyperlipidemia Father    • Hyperthyroidism Maternal Grandmother    • Hypothyroidism Maternal Aunt        ROS: 14 point review of systems was performed and all other systems were reviewed and are negative except for documented findings in HPI and today's encounter.     Allergies:   Allergies   Allergen Reactions   • Penicillins Hives     Constitutional:  Denies fever, shaking or chills   Eyes:  Denies change in visual acuity   HENT:  Denies nasal congestion or sore throat   Respiratory:  Denies cough or shortness of breath   Cardiovascular:  Denies chest pain or severe LE edema   GI:  Denies abdominal pain, nausea, vomiting, bloody stools or diarrhea   Musculoskeletal:  Numbness, tingling, pain, or loss of motor function only as noted above in history of present illness.  : Denies painful urination or hematuria  Integument:  Denies rash, lesion or ulceration   Neurologic:  Denies headache or focal weakness  Endocrine:  Denies lymphadenopathy  Psych:  Denies confusion or change  "in mental status   Hem:  Denies active bleeding    Subjective     Objective:    Physical Exam: 48 y.o. female  Wt Readings from Last 3 Encounters:   02/04/19 57.8 kg (127 lb 6.4 oz)   12/20/18 56.7 kg (125 lb)   08/21/18 58.1 kg (128 lb)     Ht Readings from Last 3 Encounters:   02/04/19 160 cm (63\")   12/20/18 160 cm (63\")   08/21/18 160 cm (63\")     Body mass index is 22.57 kg/m².    Vitals:    02/04/19 0911   Temp: 99.6 °F (37.6 °C)       Vital signs reviewed.   General Appearance:    Alert, cooperative, in no acute distress                  Eyes: conjunctiva clear  ENT: external ears and nose atraumatic  CV: no peripheral edema  Resp: normal respiratory effort  Skin: no rashes or wounds; normal turgor  Psych: mood and affect appropriate  Lymph: no nodes appreciated  Neuro: gross sensation intact  Vascular:  Palpable peripheral pulse in noted extremity  Musculoskeletal Extremities: HIP Exam: normal gait without assistive device bilateral hips, Stinchfield negative and no sstiffness, IRENE/FADIR neg, no palp tenderness on exam- \"deeper\".  no radicular quality 2+ pedal pulses and brisk capillary refill Pedal edema none      Radiology:   Imaging done today and discussed at length with the patient:    Indication: pain related symptoms,  Views: 2V AP&LAT left hip(s)   Findings: normal for age  Comparison views: not available      Assessment:     ICD-10-CM ICD-9-CM   1. Chronic left hip pain M25.552 719.45    G89.29 338.29        Procedures       Plan: Biomechanics of pertinent body area discussed.  Risks, benefits, alternatives, comparisons, and complications of accepted medicines, injections, recommendations, surgical procedures, and therapies explained and education provided in laymen's terms. The patient was given the opportunity to ask questions and they were answerved to their satisfaction.   Natural history and expected course of this patient's diagnosis discussed along with evaluation of therapies. Questions " answered.  OTC analgesics as needed with dosage warning and instructions given: OTC meds: Naproxen  Cryotherapy/brachy therapy as indicated with instructions.   Advised on strengthening exercises, stressed importance of these with progression of arthritis, demonstrated exercises to patient with repeat demonstration and verb of understanding.   This Consult is done at the request of this patient's PCP (as listed at the top of this note)  to whom I will send this report with this rendered opinion.  Biomechanics and proper use of ambulatory aids:  crutches, walker, cane, &/or trekking poles.  If she doesn't improve with nsaids, may try referral to pain for diagnostic and thepeutic SI injection to see if it numbs.  ? If scaring etc from colon sx causing pelvic pain..    2/4/2019

## 2019-06-12 ENCOUNTER — OFFICE (OUTPATIENT)
Dept: URBAN - METROPOLITAN AREA CLINIC 75 | Facility: CLINIC | Age: 49
End: 2019-06-12

## 2019-06-12 VITALS
WEIGHT: 123 LBS | HEIGHT: 64 IN | DIASTOLIC BLOOD PRESSURE: 78 MMHG | SYSTOLIC BLOOD PRESSURE: 110 MMHG | HEART RATE: 72 BPM

## 2019-06-12 DIAGNOSIS — R19.5 OTHER FECAL ABNORMALITIES: ICD-10-CM

## 2019-06-12 DIAGNOSIS — R19.4 CHANGE IN BOWEL HABIT: ICD-10-CM

## 2019-06-12 DIAGNOSIS — R10.32 LEFT LOWER QUADRANT PAIN: ICD-10-CM

## 2019-06-12 DIAGNOSIS — K21.9 GASTRO-ESOPHAGEAL REFLUX DISEASE WITHOUT ESOPHAGITIS: ICD-10-CM

## 2019-06-12 PROCEDURE — 99214 OFFICE O/P EST MOD 30 MIN: CPT | Performed by: INTERNAL MEDICINE

## 2019-06-12 RX ORDER — OMEPRAZOLE 40 MG/1
CAPSULE, DELAYED RELEASE ORAL
Qty: 14 | Refills: 0 | Status: COMPLETED
Start: 2019-06-12 | End: 2021-03-02

## 2019-08-01 ENCOUNTER — OFFICE VISIT (OUTPATIENT)
Dept: FAMILY MEDICINE CLINIC | Facility: CLINIC | Age: 49
End: 2019-08-01

## 2019-08-01 VITALS
SYSTOLIC BLOOD PRESSURE: 100 MMHG | OXYGEN SATURATION: 99 % | WEIGHT: 128 LBS | DIASTOLIC BLOOD PRESSURE: 64 MMHG | RESPIRATION RATE: 16 BRPM | BODY MASS INDEX: 22.68 KG/M2 | HEIGHT: 63 IN | HEART RATE: 80 BPM

## 2019-08-01 DIAGNOSIS — G89.29 CHRONIC LEFT-SIDED LOW BACK PAIN WITH LEFT-SIDED SCIATICA: Primary | ICD-10-CM

## 2019-08-01 DIAGNOSIS — M53.3 SACROILIAC JOINT DYSFUNCTION: ICD-10-CM

## 2019-08-01 DIAGNOSIS — M54.42 CHRONIC LEFT-SIDED LOW BACK PAIN WITH LEFT-SIDED SCIATICA: Primary | ICD-10-CM

## 2019-08-01 PROCEDURE — 99213 OFFICE O/P EST LOW 20 MIN: CPT | Performed by: FAMILY MEDICINE

## 2019-08-01 RX ORDER — OMEPRAZOLE 40 MG/1
1 CAPSULE, DELAYED RELEASE ORAL DAILY
COMMUNITY
Start: 2019-06-12 | End: 2019-08-01 | Stop reason: SDUPTHER

## 2019-08-01 RX ORDER — OMEPRAZOLE 40 MG/1
40 CAPSULE, DELAYED RELEASE ORAL DAILY
Qty: 90 CAPSULE | Refills: 3 | Status: SHIPPED | OUTPATIENT
Start: 2019-08-01 | End: 2019-09-12

## 2019-08-01 NOTE — PROGRESS NOTES
Subjective   Betty Garg is a 49 y.o. female.     History of Present Illness   Betty is here w/ c/o Lt lower back pain.  She has had this for over a year and has seen JUSTICE Celestin and he xrayed her hip and found nothing.  She has seen PT and a chiropractor.  Nothing seems to make it better or worse.  She states she had colon surgery for diverticulitis about 18 mos ago on that same side and is now wondering if this could be related.  The pain is very sore muscular pain in low back. She has had PT for SI joint. She exercises quite a bit and this hurts a lot after exercise and is interfering with your life.   She is currently wearing a Holter Monitor for arrythemia.    The following portions of the patient's history were reviewed and updated as appropriate: allergies, current medications, past family history, past medical history, past social history, past surgical history and problem list.    Review of Systems   Musculoskeletal: Positive for back pain.   All other systems reviewed and are negative.      Objective   Physical Exam   Constitutional: She is oriented to person, place, and time. She appears well-developed and well-nourished.   HENT:   Head: Normocephalic and atraumatic.   Eyes: EOM are normal. Pupils are equal, round, and reactive to light.   Neck: Normal range of motion.   Cardiovascular: Normal rate and regular rhythm.   Pulmonary/Chest: Effort normal.   Musculoskeletal: Normal range of motion. She exhibits no edema, tenderness or deformity.   Pain with straight leg raising on left side -   Neurological: She is alert and oriented to person, place, and time.   Skin: Skin is warm and dry. No rash noted.   Psychiatric: She has a normal mood and affect. Her behavior is normal. Judgment and thought content normal.   Nursing note and vitals reviewed.        Assessment/Plan   Betty was seen today for back pain.    Diagnoses and all orders for this visit:    Chronic left-sided low back pain with left-sided  sciatica  -     Ambulatory Referral to Sports Medicine    Sacroiliac joint dysfunction  -     Ambulatory Referral to Sports Medicine

## 2019-08-15 ENCOUNTER — OFFICE VISIT (OUTPATIENT)
Dept: SPORTS MEDICINE | Facility: CLINIC | Age: 49
End: 2019-08-15

## 2019-08-15 VITALS
HEART RATE: 94 BPM | WEIGHT: 128 LBS | HEIGHT: 63 IN | DIASTOLIC BLOOD PRESSURE: 60 MMHG | OXYGEN SATURATION: 95 % | SYSTOLIC BLOOD PRESSURE: 114 MMHG | BODY MASS INDEX: 22.68 KG/M2

## 2019-08-15 DIAGNOSIS — M54.50 CHRONIC LEFT-SIDED LOW BACK PAIN WITHOUT SCIATICA: Primary | ICD-10-CM

## 2019-08-15 DIAGNOSIS — G89.29 CHRONIC LEFT-SIDED LOW BACK PAIN WITHOUT SCIATICA: Primary | ICD-10-CM

## 2019-08-15 DIAGNOSIS — M53.3 SACROILIAC JOINT DYSFUNCTION: ICD-10-CM

## 2019-08-15 PROCEDURE — 72100 X-RAY EXAM L-S SPINE 2/3 VWS: CPT | Performed by: FAMILY MEDICINE

## 2019-08-15 PROCEDURE — 99244 OFF/OP CNSLTJ NEW/EST MOD 40: CPT | Performed by: FAMILY MEDICINE

## 2019-08-15 NOTE — PROGRESS NOTES
"Betty is a 49 y.o. year old female consultation for a problem new to this examiner. Referred by Dr. Job Jackson for further evaluation and management options.     Chief Complaint   Patient presents with   • back pain LT side     x 1 year       History of Present Illness  Betty is here today for pain located: left lower back/SI joint  Onset: 1  year  Onset Description: Insidious/Gradual  Quality: aching  Severity: off and on, moderate  Timing: intermittent  Worsened by: physical activity  Alleviated by: nothing  Associated factors: nothing  Progression over time: unchanged    Did PT at [facility] last year for about 8 weeks, didn't get much better. Eval'd by Dr. Celestin (neg hip xrays) and a chiropractor without relief. Questions if it worsened after surgery for diverticulitis last year.     I have reviewed the patient's medical, family, and social history in detail and updated the computerized patient record.    Review of Systems   Constitutional: Negative for fever.   Musculoskeletal:        Per HPI   Skin: Negative for wound.   Neurological: Negative for numbness.   All other systems reviewed and are negative.      /60   Pulse 94   Ht 160 cm (62.99\")   Wt 58.1 kg (128 lb)   SpO2 95%   BMI 22.68 kg/m²      Physical Exam    Vital signs reviewed.   General: No acute distress.  Eyes: conjunctiva clear; pupils equally round and reactive  ENT: external ears and nose atraumatic; oropharynx clear  CV: no peripheral edema, 2+ distal pulses  Resp: normal respiratory effort, no use of accessory muscles  Skin: no rashes or wounds; normal turgor  Psych: mood and affect appropriate; recent and remote memory intact  Neuro: sensation to light touch intact    MSK Exam:  Ortho Exam  Lumbar spine/Pelvis: Normal appearance. No tenderness but localizes pain to the left sacroiliac joint. Normal ROM, mild pain with extension. Negative SLR and IRENE; there is mild pain in the SIJ with crossleg adduction stretch. Appears to have a " left anterior innominate rotation.     Lumbar Spine X-Ray  Indication: Pain  Views: AP and Lateral    Findings:  No fracture  No bony lesion  Normal soft tissues  Normal disc spaces    No prior studies were available for comparison.       Diagnoses and all orders for this visit:    Chronic left-sided low back pain without sciatica  -     XR Spine Lumbar 2 or 3 View  -     Ambulatory Referral to Physical Therapy Evaluate and treat    Sacroiliac joint dysfunction  -     Ambulatory Referral to Physical Therapy Evaluate and treat    Overall I think this is representative of chronic SI dysfunction, which could have been worsened by her surgery and reduced activity during recovery. Discussed management plan with connecting to a PT skilled in managing this with manual therapy and optimizing her strengthening at home. If necessary we could perform an ultrasound-guided sacroiliac joint injection for pain relief, but I think that would be short-lived without correcting her mechanics. F/u in 4-6 weeks to recheck.       EMR Dragon/Transcription disclaimer:    Much of this encounter note is an electronic transcription/translation of spoken language to printed text.  The electronic translation of spoken language may permit erroneous, or at times, nonsensical words or phrases to be inadvertently transcribed.  Although I have reviewed the note for such errors some may still exist.

## 2019-08-28 VITALS
SYSTOLIC BLOOD PRESSURE: 106 MMHG | OXYGEN SATURATION: 98 % | DIASTOLIC BLOOD PRESSURE: 66 MMHG | SYSTOLIC BLOOD PRESSURE: 109 MMHG | RESPIRATION RATE: 17 BRPM | DIASTOLIC BLOOD PRESSURE: 74 MMHG | DIASTOLIC BLOOD PRESSURE: 77 MMHG | SYSTOLIC BLOOD PRESSURE: 136 MMHG | RESPIRATION RATE: 13 BRPM | RESPIRATION RATE: 14 BRPM | SYSTOLIC BLOOD PRESSURE: 112 MMHG | HEART RATE: 102 BPM | SYSTOLIC BLOOD PRESSURE: 119 MMHG | OXYGEN SATURATION: 99 % | OXYGEN SATURATION: 100 % | TEMPERATURE: 97.8 F | DIASTOLIC BLOOD PRESSURE: 59 MMHG | HEART RATE: 94 BPM | SYSTOLIC BLOOD PRESSURE: 111 MMHG | HEART RATE: 86 BPM | HEART RATE: 95 BPM | TEMPERATURE: 97.9 F | SYSTOLIC BLOOD PRESSURE: 114 MMHG | HEART RATE: 87 BPM | WEIGHT: 127 LBS | DIASTOLIC BLOOD PRESSURE: 64 MMHG | HEART RATE: 84 BPM | DIASTOLIC BLOOD PRESSURE: 63 MMHG | DIASTOLIC BLOOD PRESSURE: 55 MMHG | DIASTOLIC BLOOD PRESSURE: 69 MMHG | DIASTOLIC BLOOD PRESSURE: 80 MMHG | SYSTOLIC BLOOD PRESSURE: 120 MMHG | OXYGEN SATURATION: 96 % | HEIGHT: 64 IN | RESPIRATION RATE: 16 BRPM

## 2019-08-29 ENCOUNTER — OFFICE (OUTPATIENT)
Dept: URBAN - METROPOLITAN AREA PATHOLOGY 4 | Facility: PATHOLOGY | Age: 49
End: 2019-08-29
Payer: COMMERCIAL

## 2019-08-29 ENCOUNTER — AMBULATORY SURGICAL CENTER (OUTPATIENT)
Dept: URBAN - METROPOLITAN AREA SURGERY 17 | Facility: SURGERY | Age: 49
End: 2019-08-29

## 2019-08-29 DIAGNOSIS — K29.50 UNSPECIFIED CHRONIC GASTRITIS WITHOUT BLEEDING: ICD-10-CM

## 2019-08-29 DIAGNOSIS — R19.4 CHANGE IN BOWEL HABIT: ICD-10-CM

## 2019-08-29 DIAGNOSIS — R19.5 OTHER FECAL ABNORMALITIES: ICD-10-CM

## 2019-08-29 DIAGNOSIS — K57.30 DIVERTICULOSIS OF LARGE INTESTINE WITHOUT PERFORATION OR ABS: ICD-10-CM

## 2019-08-29 DIAGNOSIS — R10.32 LEFT LOWER QUADRANT PAIN: ICD-10-CM

## 2019-08-29 DIAGNOSIS — K21.9 GASTRO-ESOPHAGEAL REFLUX DISEASE WITHOUT ESOPHAGITIS: ICD-10-CM

## 2019-08-29 DIAGNOSIS — Z83.71 FAMILY HISTORY OF COLONIC POLYPS: ICD-10-CM

## 2019-08-29 LAB
GI HISTOLOGY: A. SELECT: (no result)
GI HISTOLOGY: B. SELECT: (no result)
GI HISTOLOGY: PDF REPORT: (no result)

## 2019-08-29 PROCEDURE — 43239 EGD BIOPSY SINGLE/MULTIPLE: CPT | Performed by: INTERNAL MEDICINE

## 2019-08-29 PROCEDURE — 88305 TISSUE EXAM BY PATHOLOGIST: CPT | Performed by: INTERNAL MEDICINE

## 2019-08-29 PROCEDURE — 45378 DIAGNOSTIC COLONOSCOPY: CPT | Performed by: INTERNAL MEDICINE

## 2019-09-09 ENCOUNTER — TREATMENT (OUTPATIENT)
Dept: PHYSICAL THERAPY | Facility: CLINIC | Age: 49
End: 2019-09-09

## 2019-09-09 DIAGNOSIS — M53.3 SACROILIAC JOINT DYSFUNCTION OF LEFT SIDE: Primary | ICD-10-CM

## 2019-09-09 PROCEDURE — 97140 MANUAL THERAPY 1/> REGIONS: CPT | Performed by: PHYSICAL THERAPIST

## 2019-09-09 PROCEDURE — 97161 PT EVAL LOW COMPLEX 20 MIN: CPT | Performed by: PHYSICAL THERAPIST

## 2019-09-09 PROCEDURE — 97110 THERAPEUTIC EXERCISES: CPT | Performed by: PHYSICAL THERAPIST

## 2019-09-09 NOTE — PROGRESS NOTES
Physical Therapy Initial Evaluation and Plan of Care      Patient: Betty Garg   : 1970  Diagnosis/ICD-10 Code:  Sacroiliac joint dysfunction of left side [M53.3]  Referring practitioner: Bernabe Goode MD    Subjective Evaluation    History of Present Illness  Mechanism of injury: Insidious onset about a year ago.  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 Walking 45 min increased pain afterward.  Knees past year started hurting with stairs and squatting  Belong to [place] . 12 and 9 year old sons  Tried PT and DC with dry needling. No help      Patient Occupation: [employer] Quality of life: good    Pain  Quality: dull ache  Relieving factors: rest  Aggravating factors: prolonged positioning and lifting  Progression: no change    Social Support  Lives in: multiple-level home  Lives with: spouse and young children    Diagnostic Tests  X-ray: normal    Treatments  Previous treatment: chiropractic and physical therapy  Patient Goals  Patient goals for therapy: decreased pain, return to sport/leisure activities and independence with ADLs/IADLs             Objective       Active Range of Motion     Lumbar   Normal active range of motion    Additional Active Range of Motion Details  Decreased L L1-2, L2-3  (L) PINN  Decreased anterior hip capsule mobility L > R    Passive Range of Motion     Additional Passive Range of Motion Details  Excessive hip IR B    Strength/Myotome Testing     Lumbar     Right   Normal strength    Left Hip   Planes of Motion   Flexion: 5  Extension: 5  Abduction: 5  External rotation: 4  Internal rotation: 5    Right Hip   Planes of Motion   External rotation: 5  Internal rotation: 5    Left Knee   Flexion: 5  Extension: 5    Right Knee   Flexion: 5  Extension: 5    Tests     Lumbar     Left   Negative femoral stretch, passive SLR and quadrant.     Right   Negative femoral stretch, passive SLR and quadrant.           Assessment & Plan     Assessment  Impairments: abnormal or restricted ROM, activity intolerance, impaired physical strength, lacks appropriate home exercise program and pain with function  Assessment details: Pt is a good candidate for skilled PT intervention, stacey manual therapy for spinal joint mobility, alignment, postural restoration and core strengthening to restore functional AROM and strength to return to previous level of ADL's.  Pelvic obliquity with weak L hip ER's and lower abdominals and tight anterior hip( capsules. Fatigued much faster doing ER on L compared with R  Prognosis: good  Prognosis details: Short Term Goals: ( 3 weeks)  1.Pt to be independent with HEP  2. Pt to exhibit improved L hip ER strength to 4+/5 to allow for increased ease with ADL's  3. Pt to demonstrate proper lifting technique  4. Pt to improve lower ab strength to 4/5 to allow for improved standing/stairclimbing tolerance    Long Term Goals (6 weeks )  1. Pt to demonstrate no pelvic obliquity  2. Pt to exhibit 5/5 lower abdominal and Hip ER strength to allow for more strenuous daily activities  3. Pt to resume fitness activities without pain  4. Pt to score >70/80 on LEFS  Functional Limitations: lifting, uncomfortable because of pain and sitting  Plan  Therapy options: will be seen for skilled physical therapy services  Planned therapy interventions: abdominal trunk stabilization, balance/weight-bearing training, body mechanics training, home exercise program, joint mobilization, manual therapy, neuromuscular re-education, postural training, soft tissue mobilization, spinal/joint mobilization, strengthening and stretching  Frequency: 1x week  Duration in weeks: 6  Treatment plan discussed with: patient        Manual Therapy:    12     mins  98250;  Therapeutic Exercise:    10     mins  06058;     Neuromuscular Abraham:        mins  31462;    Therapeutic Activity:          mins  37291;     Gait Training:           mins  13462;      Ultrasound:          mins  61319;    Electrical Stimulation:         mins  02266 ( );  Dry Needling          mins self-pay    Timed Treatment:   22   mins   Total Treatment:     52   mins    PT SIGNATURE: MARTI Cano License # [id number]  DATE TREATMENT INITIATED: 9/9/2019    Initial Certification  Certification Period: 12/8/2019  I certify that the therapy services are furnished while this patient is under my care.  The services outlined above are required by this patient, and will be reviewed every 90 days.     PHYSICIAN: Bernabe Goode MD      DATE:     Please sign and return via fax to [phone].. Thank you, [facility].

## 2019-09-10 DIAGNOSIS — E78.2 MIXED HYPERLIPIDEMIA: Primary | ICD-10-CM

## 2019-09-10 DIAGNOSIS — Z79.899 HIGH RISK MEDICATION USE: ICD-10-CM

## 2019-09-10 DIAGNOSIS — Z00.00 ROUTINE GENERAL MEDICAL EXAMINATION AT A HEALTH CARE FACILITY: ICD-10-CM

## 2019-09-10 LAB
ALBUMIN SERPL-MCNC: 4.5 G/DL (ref 3.5–5.2)
ALBUMIN/GLOB SERPL: 2.3 G/DL
ALP SERPL-CCNC: 56 U/L (ref 39–117)
ALT SERPL-CCNC: 14 U/L (ref 1–33)
AST SERPL-CCNC: 16 U/L (ref 1–32)
BILIRUB SERPL-MCNC: 0.4 MG/DL (ref 0.2–1.2)
BUN SERPL-MCNC: 15 MG/DL (ref 6–20)
BUN/CREAT SERPL: 22.1 (ref 7–25)
CALCIUM SERPL-MCNC: 9 MG/DL (ref 8.6–10.5)
CHLORIDE SERPL-SCNC: 102 MMOL/L (ref 98–107)
CHOLEST SERPL-MCNC: 106 MG/DL (ref 0–200)
CO2 SERPL-SCNC: 21.4 MMOL/L (ref 22–29)
CREAT SERPL-MCNC: 0.68 MG/DL (ref 0.57–1)
ERYTHROCYTE [DISTWIDTH] IN BLOOD BY AUTOMATED COUNT: 12.4 % (ref 12.3–15.4)
GLOBULIN SER CALC-MCNC: 2 GM/DL
GLUCOSE SERPL-MCNC: 109 MG/DL (ref 65–99)
HCT VFR BLD AUTO: 39 % (ref 34–46.6)
HDLC SERPL-MCNC: 54 MG/DL (ref 40–60)
HGB BLD-MCNC: 13.2 G/DL (ref 12–15.9)
LDLC SERPL CALC-MCNC: 42 MG/DL (ref 0–100)
LDLC/HDLC SERPL: 0.78 {RATIO}
MCH RBC QN AUTO: 32.4 PG (ref 26.6–33)
MCHC RBC AUTO-ENTMCNC: 33.8 G/DL (ref 31.5–35.7)
MCV RBC AUTO: 95.6 FL (ref 79–97)
PLATELET # BLD AUTO: 248 10*3/MM3 (ref 140–450)
POTASSIUM SERPL-SCNC: 3.9 MMOL/L (ref 3.5–5.2)
PROT SERPL-MCNC: 6.5 G/DL (ref 6–8.5)
RBC # BLD AUTO: 4.08 10*6/MM3 (ref 3.77–5.28)
SODIUM SERPL-SCNC: 139 MMOL/L (ref 136–145)
TRIGL SERPL-MCNC: 49 MG/DL (ref 0–150)
VLDLC SERPL CALC-MCNC: 9.8 MG/DL
WBC # BLD AUTO: 6.76 10*3/MM3 (ref 3.4–10.8)

## 2019-09-12 ENCOUNTER — OFFICE VISIT (OUTPATIENT)
Dept: FAMILY MEDICINE CLINIC | Facility: CLINIC | Age: 49
End: 2019-09-12

## 2019-09-12 VITALS
BODY MASS INDEX: 22.5 KG/M2 | DIASTOLIC BLOOD PRESSURE: 64 MMHG | OXYGEN SATURATION: 99 % | HEART RATE: 72 BPM | SYSTOLIC BLOOD PRESSURE: 100 MMHG | HEIGHT: 63 IN | RESPIRATION RATE: 16 BRPM | WEIGHT: 127 LBS

## 2019-09-12 DIAGNOSIS — Z00.00 ROUTINE GENERAL MEDICAL EXAMINATION AT A HEALTH CARE FACILITY: Primary | ICD-10-CM

## 2019-09-12 PROCEDURE — 99396 PREV VISIT EST AGE 40-64: CPT | Performed by: FAMILY MEDICINE

## 2019-09-12 RX ORDER — TRETINOIN 0.8 MG/G
GEL TOPICAL AS NEEDED
COMMUNITY
Start: 2019-08-27 | End: 2020-01-31

## 2019-09-12 NOTE — PATIENT INSTRUCTIONS
Annual Wellness  Personal Prevention Plan of Service   Please make sure you get a flu shot.  Date of Office Visit:  2019  Encounter Provider:  Job Jackson MD  Place of Service:  [facility]  Patient Name: Betty Garg  :  1970    As part of the Annual Wellness portion of your visit today, we are providing you with this personalized preventive plan of services (PPPS). This plan is based upon recommendations of the [place] Preventive Services Task Force (USPSTF) and the Advisory Committee on Immunization Practices (ACIP).    This lists the preventive care services that should be considered, and provides dates of when you are due. Items listed as completed are up-to-date and do not require any further intervention.    Health Maintenance   Topic Date Due   • INFLUENZA VACCINE  2019   • PAP SMEAR  2019   • LIPID PANEL  09/10/2020   • ANNUAL PHYSICAL  2020   • TDAP/TD VACCINES (3 - Td) 2028       No orders of the defined types were placed in this encounter.      Return in about 6 months (around 3/12/2020).

## 2019-09-12 NOTE — PROGRESS NOTES
"Preventive Exam    History of Present Illness: Betty is here for check up and review of routine health maintenance. She states she is doing well and has no concerns.    She has a hx of palpitations and and paroxysmal afib and is followed by cardiology - she is on Bystolic, aspirin and Crestor.     REVIEW OF SYSTEMS  Constitutional: Negative.    HENT: Negative.    Eyes: Negative.    Respiratory: Negative.    Cardiovascular: Occasional heart palpitations.  Gastrointestinal: Negative.    Endocrine: Negative.    Genitourinary: Negative.    Musculoskeletal: Negative.  Skin: Negative. Hair thinning noted.   Allergic/Immunologic: Negative.    Neurological: Negative.    Hematological: Negative.    Psychiatric/Behavioral: Negative.    All other systems are reviewed and are negative.TW          PHYSICAL EXAM    Vitals:    09/12/19 1449   BP: 100/64   Pulse: 72   Resp: 16   SpO2: 99%   Weight: 57.6 kg (127 lb)   Height: 160 cm (63\")     GENERAL: alert and oriented, afebrile and vital signs stable  HEENT: oral mucosa moist, PEERLA, EOM, conjunctiva normal  No cervical adenopathy  LUNGS: clear to ascultation bilaterally, no rales, ronchi or wheezing  HEART: RRR S1 S2 without murmers, thrills, rubs or gallops  CHEST WALL: within normal limits, no tenderness  ABDOMEN: WNL. Normal BS.  EXTREMITIES: No clubbing, cyanosis or edema noted. Normal Pulses.  SKIN: warm, dry, no rashes noted  NEURO: CN II- XII grossly intact    ASSESSMENT AND PLAN  Problem List Items Addressed This Visit     None      Visit Diagnoses     Routine general medical examination at a health care facility    -  Primary        Routine health maintenance reviewed and discussed with Betty.  Labs on the chart and reviewed with Betty.    .No orders of the defined types were placed in this encounter.    Return in about 6 months (around 3/12/2020).   Patient Instructions       Annual Wellness  Personal Prevention Plan of Service   Please make sure you get a flu " shot.  Date of Office Visit:  2019  Encounter Provider:  Job Jackson MD  Place of Service:  [facility]  Patient Name: Betty Garg  :  1970    As part of the Annual Wellness portion of your visit today, we are providing you with this personalized preventive plan of services (PPPS). This plan is based upon recommendations of the [place] Preventive Services Task Force (USPSTF) and the Advisory Committee on Immunization Practices (ACIP).    This lists the preventive care services that should be considered, and provides dates of when you are due. Items listed as completed are up-to-date and do not require any further intervention.    Health Maintenance   Topic Date Due   • INFLUENZA VACCINE  2019   • PAP SMEAR  2019   • LIPID PANEL  09/10/2020   • ANNUAL PHYSICAL  2020   • TDAP/TD VACCINES (3 - Td) 2028       No orders of the defined types were placed in this encounter.      Return in about 6 months (around 3/12/2020).

## 2019-09-18 ENCOUNTER — CONSULT (OUTPATIENT)
Dept: CARDIOLOGY | Facility: CLINIC | Age: 49
End: 2019-09-18

## 2019-09-18 VITALS
HEIGHT: 63 IN | DIASTOLIC BLOOD PRESSURE: 78 MMHG | SYSTOLIC BLOOD PRESSURE: 116 MMHG | WEIGHT: 128 LBS | HEART RATE: 95 BPM | BODY MASS INDEX: 22.68 KG/M2

## 2019-09-18 DIAGNOSIS — I49.3 PVC (PREMATURE VENTRICULAR CONTRACTION): Primary | ICD-10-CM

## 2019-09-18 DIAGNOSIS — I48.0 PAROXYSMAL ATRIAL FIBRILLATION (HCC): ICD-10-CM

## 2019-09-18 PROCEDURE — 93000 ELECTROCARDIOGRAM COMPLETE: CPT | Performed by: INTERNAL MEDICINE

## 2019-09-18 PROCEDURE — 99244 OFF/OP CNSLTJ NEW/EST MOD 40: CPT | Performed by: INTERNAL MEDICINE

## 2019-09-18 RX ORDER — FAMOTIDINE 10 MG
10 TABLET ORAL NIGHTLY PRN
COMMUNITY
End: 2020-01-31

## 2019-09-18 RX ORDER — ESOMEPRAZOLE MAGNESIUM 40 MG/1
40 CAPSULE, DELAYED RELEASE ORAL
Qty: 30 CAPSULE | Refills: 0 | Status: SHIPPED | OUTPATIENT
Start: 2019-09-18 | End: 2019-10-18

## 2019-09-18 RX ORDER — UBIDECARENONE 100 MG
100 CAPSULE ORAL DAILY
COMMUNITY

## 2019-09-18 NOTE — PROGRESS NOTES
Electrophysiology Clinic Consult     Betty Garg  1970  [unfilled]  [unfilled]    09/18/19    DATE OF ADMISSION: (Not on file)  [facility] CARDIOLOGY    Job Jackson MD  [address] / [facility] [address]    Chief Complaint   Patient presents with   • Atrial Fibrillation     Problem List:  1. Paroxysmal Atrial Fibrillation:   a. CHADSVasc = 0   b. 2 episodes of atrial fibrillation 8/2011 and 12/2012 with spontaneous conversion to normal sinus rhythm  c. Prn Rythmol  2. Paroxysmal SVT/PVCs  a. 24 Hour Holter Monitor 7/31/19: NSR  bpm, average 80 bpm. Occasional PVCs. AT   3. Chest Pain Syndrome  a. Mild CAD calcification on CAC scan   b. Reported normal stress test 2018- data deficit  4. GERD  5. Anxiety        History of Present Illness:   49 year old WF with history of rare, brief PAF in the past, with treatment with prn Rythmol who presents today for re-evaluation for recurrent symptoms of palpitations. She did well for several years, however, about 2 months ago, she started feeling different palpations described as skipped beats with pauses, occurring on and off all day, occurring for several days, triggered by worsened reflux. She called Dr. Jiang's office and was told to wear a Holter Monitor which showed PVCs, she did have symptoms. She still has episodes of PVCs multiple times per week lasting a few minutes at a time. She has GERD that is not well controlled. She used to be on a PPI but has been off of it for about one year. She uses Pepcid prn at least two times per week. She has not had any atrial fibrillation or used Rythmol. Her BP is well controlled. She is on Bystolic for the past 5 years to control HR. She drinks 2 cups of coffee per day. She drinks ETOH mostly on weekends, 3-5 drinks. She denies tobacco.       Allergies   Allergen Reactions   • Penicillins Hives        Cannot display prior to admission medications because the patient has not been  admitted in this contact.            Current Outpatient Medications:   •  aspirin 81 MG tablet, Take 81 mg by mouth daily. oral delayed release tablet, Disp: , Rfl:   •  cholecalciferol (VITAMIN D3) 1000 UNITS tablet, Take 4,000 Units by mouth Daily., Disp: , Rfl:   •  CINNAMON PO, Take 450 mg by mouth Daily., Disp: , Rfl:   •  coenzyme Q10 100 MG capsule, Take 100 mg by mouth Daily., Disp: , Rfl:   •  famotidine (PEPCID) 10 MG tablet, Take 10 mg by mouth At Night As Needed for Heartburn., Disp: , Rfl:   •  Multiple Vitamin (MULTI-DAY PO), Take 1 tablet by mouth Daily., Disp: , Rfl:   •  Multiple Vitamins-Minerals (HAIR VITAMINS PO), Take 1 tablet by mouth Daily., Disp: , Rfl:   •  nebivolol (BYSTOLIC) 2.5 MG tablet, Take 2.5 mg by mouth daily., Disp: , Rfl:   •  Probiotic Product (PROBIOTIC DAILY PO), Take 1 tablet by mouth Daily., Disp: , Rfl:   •  RETIN-A MICRO PUMP 0.08 % gel, Apply  topically to the appropriate area as directed As Needed., Disp: , Rfl:   •  rosuvastatin (CRESTOR) 20 MG tablet, Take 20 mg by mouth Daily., Disp: , Rfl:   •  Wheat Dextrin (BENEFIBER DRINK MIX PO), Take 1 bottle by mouth Daily., Disp: , Rfl:     Social History     Socioeconomic History   • Marital status:      Spouse name: Tio   • Number of children: 2   • Years of education: Not on file   • Highest education level: Not on file   Occupational History   • Occupation: Sales   Tobacco Use   • Smoking status: Never Smoker   • Smokeless tobacco: Never Used   Substance and Sexual Activity   • Alcohol use: Yes     Comment: occas   • Drug use: No   • Sexual activity: Defer     Partners: Male   Social History Narrative    Lives at home with  and children       Family History   Problem Relation Age of Onset   • Hypothyroidism Mother    • Heart disease Father    • Hypertension Father    • Hyperlipidemia Father    • Hyperthyroidism Maternal Grandmother    • Hypothyroidism Maternal Aunt    • No Known Problems Brother    Father:  " MI age 39  Mother:  age 68, no autopsy done, may have been stroke    REVIEW OF SYSTEMS:   CONST:  No weight loss, fever, chills, weakness or fatigue.   HEENT:  No visual loss, blurred vision, double vision, yellow sclerae.                   No hearing loss, congestion, sore throat.   SKIN:      No rashes, urticaria, ulcers, sores.     RESP:     No shortness of breath, hemoptysis, cough, sputum.   GI:           No anorexia, nausea, vomiting, diarrhea. No abdominal pain, melena.   :         No burning on urination, hematuria or increased frequency.  ENDO:    No diaphoresis, cold or heat intolerance. No polyuria or polydipsia.   NEURO:  No headache, dizziness, syncope, paralysis, ataxia, or parasthesias.                  No change in bowel or bladder control. No history of CVA/TIA  MUSC:    No muscle, back pain, joint pain or stiffness.   HEME:    No anemia, bleeding, bruising. No history of DVT/PE.  PSYCH:  No history of depression, anxiety    Vitals:    19 1545   BP: 116/78   BP Location: Right arm   Patient Position: Sitting   Pulse: 95   Weight: 58.1 kg (128 lb)   Height: 160 cm (63\")                 Physical Exam:  GEN: Well nourished, well-developed, no acute distress  HEENT: Normocephalic, atraumatic, PERRLA, moist mucous membranes  NECK: Supple, NO JVD, no thyromegaly, no lymphadenopathy  CARD: S1S2, RRR, no murmur, gallop, rub, PMI NL  LUNGS: Clear to auscultation, normal respiratory effort  ABDOMEN: Soft, nontender, normal bowel sounds  EXTREMITIES: No gross deformities, no clubbing, cyanosis, or edema  SKIN: Warm, dry, No lesions  NEURO: No focal deficits, alert and oriented x 3  PSYCHIATRIC: Normal affect and mood      I personally viewed and interpreted the patient's EKG/Telemetry/lab data    Lab Results   Component Value Date    CALCIUM 9.0 09/10/2019     09/10/2019    K 3.9 09/10/2019    CO2 21.4 (L) 09/10/2019     09/10/2019    BUN 15 09/10/2019    CREATININE 0.68 09/10/2019 "    EGFRIFAFRI 112 09/10/2019    EGFRIFNONA 92 09/10/2019    BCR 22.1 09/10/2019     Lab Results   Component Value Date    WBC 6.76 09/10/2019    HGB 13.2 09/10/2019    HCT 39.0 09/10/2019    MCV 95.6 09/10/2019     09/10/2019     Lab Results   Component Value Date    INR 1.1 03/15/2018    PROTIME 12.4 03/15/2018     Lab Results   Component Value Date    TSH 3.430 11/27/2018    L7MKLWY 6.0 01/10/2017             ECG 12 Lead  Date/Time: 9/18/2019 4:05 PM  Performed by: Eric Townsend MD  Authorized by: Eric Townsend MD   Previous ECG: no previous ECG available  Rhythm: sinus rhythm  BPM: 95                ICD-10-CM ICD-9-CM   1. PVC (premature ventricular contraction) I49.3 427.69   2. Paroxysmal atrial fibrillation (CMS/HCC) I48.0 427.31       Assessment and Plan:   1. PVCs:  - symptomatic in nature with Holter monitor less than 1% overall. Dr. Townsend discussed triggers for PVCs including GERD, caffiene, stimulants, stress, lack of sleep. She does have uncontrolled GERD. Would recommend treatment of GERD with a PPI to assess if PVC burden improves. Will prescribe Nexium 40 mg daily x 30 days. If PVCs do not improved, would recommend Flecainide. Will try to obtain her most recent echocardiogram and stress test from Dr. Jiang.     2. PAF:  - overall controlled with no recurrences. She has prn Rythmol. As per #1, consider Flecainide and if Flecainide is started, she would not use Rythmol anymore.       Scribed for Eric Townsend MD by Mabel Varela PA-C. 9/18/2019  4:26 PM     I, Eric Townsend MD, personally performed the services described in this documentation as scribed by the above named individual in my presence, and it is both accurate and complete.  9/18/2019  4:30 PM

## 2019-09-26 ENCOUNTER — TREATMENT (OUTPATIENT)
Dept: PHYSICAL THERAPY | Facility: CLINIC | Age: 49
End: 2019-09-26

## 2019-09-26 DIAGNOSIS — M53.3 SACROILIAC JOINT DYSFUNCTION OF LEFT SIDE: Primary | ICD-10-CM

## 2019-09-26 PROCEDURE — 97140 MANUAL THERAPY 1/> REGIONS: CPT | Performed by: PHYSICAL THERAPIST

## 2019-09-26 PROCEDURE — 97110 THERAPEUTIC EXERCISES: CPT | Performed by: PHYSICAL THERAPIST

## 2019-09-26 NOTE — PROGRESS NOTES
Physical Therapy Daily Progress Note        Subjective     Betty Garg reports: I was feeling  Better then started hurting after gym 2 days ago. Tried to do spider pushups    Objective   See Exercise, Manual, and Modality Logs for complete treatment.       Assessment/Plan  Pelvic alignment improved after PT. Reviewed HEP. No spider pushups for now    Progress per Plan of Care           Manual Therapy:  20       mins  38232;  Therapeutic Exercise: 15      mins  90127;     Neuromuscular Abraham:       mins  94948;    Therapeutic Activity:         mins  51826;     Gait Training:         mins  14846;     Ultrasound:         mins  08336;    Electrical Stimulation:        mins  70617 ( );  Dry Needling         mins self-pay    Timed Treatment:   35   mins   Total Treatment:     35   mins    Leandra Cazares, PT  Physical Therapist  [place] License # [id number]

## 2019-10-03 ENCOUNTER — TREATMENT (OUTPATIENT)
Dept: PHYSICAL THERAPY | Facility: CLINIC | Age: 49
End: 2019-10-03

## 2019-10-03 DIAGNOSIS — M53.3 SACROILIAC JOINT DYSFUNCTION OF LEFT SIDE: Primary | ICD-10-CM

## 2019-10-03 PROCEDURE — 97140 MANUAL THERAPY 1/> REGIONS: CPT | Performed by: PHYSICAL THERAPIST

## 2019-10-03 PROCEDURE — 97110 THERAPEUTIC EXERCISES: CPT | Performed by: PHYSICAL THERAPIST

## 2019-10-03 NOTE — PROGRESS NOTES
Physical Therapy Daily Progress Note        Subjective     Betty Mazariegosers reports: Still sore L L-s day after exercising. Same for walking hills and elliptical. Only did HEP twice since last session    Objective   Mild tightness L iliopsoas  See Exercise, Manual, and Modality Logs for complete treatment.       Assessment/Plan  Anterior hip tightness continues. Still some pelvic obliquity but improved after treatment    Progress per Plan of Care           Manual Therapy:  25       mins  32059;  Therapeutic Exercise: 15      mins  50541;     Neuromuscular Abraham:       mins  06588;    Therapeutic Activity:         mins  08851;     Gait Training:         mins  49710;     Ultrasound:         mins  29252;    Electrical Stimulation:        mins  56556 ( );  Dry Needling         mins self-pay    Timed Treatment:   40   mins   Total Treatment:     40   mins    Leandra Cazares PT  Physical Therapist  [place] License # [id number]

## 2019-10-10 ENCOUNTER — TREATMENT (OUTPATIENT)
Dept: PHYSICAL THERAPY | Facility: CLINIC | Age: 49
End: 2019-10-10

## 2019-10-10 DIAGNOSIS — M53.3 SACROILIAC JOINT DYSFUNCTION OF LEFT SIDE: Primary | ICD-10-CM

## 2019-10-10 PROCEDURE — 97110 THERAPEUTIC EXERCISES: CPT | Performed by: PHYSICAL THERAPIST

## 2019-10-10 PROCEDURE — 97140 MANUAL THERAPY 1/> REGIONS: CPT | Performed by: PHYSICAL THERAPIST

## 2019-10-10 NOTE — PROGRESS NOTES
Physical Therapy Daily Progress Note        Subjective     Betty Garg reports: I felt good after last session. I tried doing elliptical on 15 elevation and 15 resistance. Only did 5 minutes but was sore afterward    Objective   See Exercise, Manual, and Modality Logs for complete treatment.       Assessment/Plan  Pt able to do 18 and 24 inch step ups without pain. 36 inch stepup became painful on the L. Advised pt to work on 24 inch stepups and avoid elliptical  > 10% elevation .Felt much better after manual therapy    Progress per Plan of Care           Manual Therapy:  25       mins  74376;  Therapeutic Exercise: 20      mins  21048;     Neuromuscular Abraham:       mins  03977;    Therapeutic Activity:         mins  60763;     Gait Training:         mins  51522;     Ultrasound:         mins  17356;    Electrical Stimulation:        mins  91375 ( );  Dry Needling         mins self-pay    Timed Treatment:   45   mins   Total Treatment:     45   mins    Leandra Cazares, PT  Physical Therapist  [place] License # [id number]

## 2019-11-05 ENCOUNTER — FLU SHOT (OUTPATIENT)
Dept: FAMILY MEDICINE CLINIC | Facility: CLINIC | Age: 49
End: 2019-11-05

## 2019-11-05 DIAGNOSIS — Z23 FLU VACCINE NEED: ICD-10-CM

## 2019-11-05 PROCEDURE — 90674 CCIIV4 VAC NO PRSV 0.5 ML IM: CPT | Performed by: FAMILY MEDICINE

## 2019-11-05 PROCEDURE — 90471 IMMUNIZATION ADMIN: CPT | Performed by: FAMILY MEDICINE

## 2020-01-31 ENCOUNTER — OFFICE VISIT (OUTPATIENT)
Dept: FAMILY MEDICINE CLINIC | Facility: CLINIC | Age: 50
End: 2020-01-31

## 2020-01-31 VITALS
WEIGHT: 130.3 LBS | HEART RATE: 107 BPM | BODY MASS INDEX: 23.09 KG/M2 | SYSTOLIC BLOOD PRESSURE: 128 MMHG | HEIGHT: 63 IN | RESPIRATION RATE: 14 BRPM | OXYGEN SATURATION: 98 % | DIASTOLIC BLOOD PRESSURE: 72 MMHG

## 2020-01-31 DIAGNOSIS — G44.209 ACUTE NON INTRACTABLE TENSION-TYPE HEADACHE: Primary | ICD-10-CM

## 2020-01-31 DIAGNOSIS — R10.11 RIGHT UPPER QUADRANT PAIN: ICD-10-CM

## 2020-01-31 PROCEDURE — 99213 OFFICE O/P EST LOW 20 MIN: CPT | Performed by: NURSE PRACTITIONER

## 2020-01-31 RX ORDER — UREA 10 %
27 LOTION (ML) TOPICAL DAILY
Qty: 30 TABLET | Refills: 3
Start: 2020-01-31 | End: 2021-01-13

## 2020-01-31 NOTE — PROGRESS NOTES
Subjective   Betty Garg is a 49 y.o. female. She is a patient of Dr. Jackson, but is new to me.      History of Present Illness   Patient here for headaches and right sided abdominal pain for about 6 months. She states that the pain is more of a pressure and feels like someone is pinching her. She states that the pain is intermittent. She denies any nausea, vomiting, diarrhea or constipation. She has not taken any medications for this.    Pt is also being seen for c/o headaches, ongoing for about 1-2 months. She states that these are intermittent and are not throbbing. She notices these more with physical activity and states that is feels like a sore spot on the top of her head, but may start at the base of her neck at times. She has taken ibuprofen and this has helped with her headaches.     The following portions of the patient's history were reviewed and updated as appropriate: allergies, current medications, past family history, past medical history, past social history, past surgical history and problem list.    Review of Systems   Constitutional: Negative for chills, fatigue and fever.   HENT: Positive for ear pain (left ear).    Eyes: Negative for blurred vision, double vision, photophobia and visual disturbance.   Respiratory: Negative for cough, chest tightness and shortness of breath.    Cardiovascular: Negative for chest pain, palpitations and leg swelling.   Gastrointestinal: Positive for abdominal pain. Negative for constipation, diarrhea, nausea and vomiting.   Endocrine: Negative for heat intolerance.   Genitourinary: Positive for flank pain. Negative for frequency and urgency.   Neurological: Positive for headache. Negative for dizziness, tremors, syncope, weakness, memory problem and confusion.   Psychiatric/Behavioral: Positive for sleep disturbance. Negative for agitation, decreased concentration, hallucinations, suicidal ideas, depressed mood and stress. The patient is nervous/anxious.         Objective   Physical Exam   Constitutional: She is oriented to person, place, and time. She appears well-developed and well-nourished.   HENT:   Head: Normocephalic and atraumatic.   Right Ear: External ear normal.   Left Ear: External ear normal.   Nose: Nose normal.   Mouth/Throat: Oropharynx is clear and moist.   Eyes: Pupils are equal, round, and reactive to light. Conjunctivae and EOM are normal.   Neck: Normal range of motion. Neck supple. No thyromegaly present.   Cardiovascular: Normal rate, regular rhythm, normal heart sounds and intact distal pulses.   No murmur heard.  Pulmonary/Chest: Effort normal and breath sounds normal.   Abdominal: Soft. Bowel sounds are normal. She exhibits no distension. There is no tenderness.   Lymphadenopathy:     She has no cervical adenopathy.   Neurological: She is alert and oriented to person, place, and time. She has normal strength. She displays no atrophy, no tremor and normal reflexes. No cranial nerve deficit or sensory deficit. She exhibits normal muscle tone. She displays a negative Romberg sign. Coordination and gait normal.   Reflex Scores:       Patellar reflexes are 2+ on the right side and 2+ on the left side.  Psychiatric: She has a normal mood and affect. Her speech is normal and behavior is normal. Judgment and thought content normal. Cognition and memory are normal.   Nursing note and vitals reviewed.      Vitals:    01/31/20 1006   BP: 128/72   Pulse: 107   Resp: 14   SpO2: 98%     Body mass index is 23.08 kg/m².    Procedures    Assessment/Plan   Problems Addressed this Visit     None      Visit Diagnoses     Acute non intractable tension-type headache    -  Primary    Relevant Medications    magnesium, as, gluconate (MAGONATE) 500 (27 Mg) MG tablet    Right upper quadrant pain        Relevant Orders    US Abdomen Complete        Return if symptoms worsen or fail to improve.            Patient Instructions   May use magnesium supplement 500mg 1 tab  by mouth daily for headaches.  Continue iburprofen as needed.  Return if symptoms worsen or fail to improve.

## 2020-01-31 NOTE — PATIENT INSTRUCTIONS
May use magnesium supplement 500mg 1 tab by mouth daily for headaches.  Continue iburprofen as needed.  Return if symptoms worsen or fail to improve.

## 2020-02-07 ENCOUNTER — HOSPITAL ENCOUNTER (OUTPATIENT)
Dept: ULTRASOUND IMAGING | Facility: HOSPITAL | Age: 50
End: 2020-02-07

## 2020-02-10 ENCOUNTER — HOSPITAL ENCOUNTER (OUTPATIENT)
Dept: ULTRASOUND IMAGING | Facility: HOSPITAL | Age: 50
Discharge: HOME | End: 2020-02-10
Admitting: NURSE PRACTITIONER

## 2020-02-10 DIAGNOSIS — R10.11 RIGHT UPPER QUADRANT PAIN: ICD-10-CM

## 2020-02-10 PROCEDURE — 76705 ECHO EXAM OF ABDOMEN: CPT

## 2020-02-13 ENCOUNTER — TELEPHONE (OUTPATIENT)
Dept: FAMILY MEDICINE CLINIC | Facility: CLINIC | Age: 50
End: 2020-02-13

## 2020-02-13 NOTE — TELEPHONE ENCOUNTER
PATIENT CALLED TO REQUEST THE RESULTS OF THE ULTRASOUND THAT SHE HAD PERFORMED ON MONDAY (2/10/20). THE PATIENT STATED THAT ANNA HERBERT ORDERED THIS ULTRASOUND.    PLEASE CALL THE PATIENT [phone] WHEN THIS MESSAGE HAS BEEN RECEIVED AND ADVISE.

## 2020-03-17 ENCOUNTER — TELEPHONE (OUTPATIENT)
Dept: FAMILY MEDICINE CLINIC | Facility: CLINIC | Age: 50
End: 2020-03-17

## 2020-04-21 ENCOUNTER — OFFICE VISIT (OUTPATIENT)
Dept: CARDIOLOGY | Facility: CLINIC | Age: 50
End: 2020-04-21

## 2020-04-21 VITALS
WEIGHT: 130 LBS | HEIGHT: 64 IN | DIASTOLIC BLOOD PRESSURE: 71 MMHG | HEART RATE: 75 BPM | BODY MASS INDEX: 22.2 KG/M2 | SYSTOLIC BLOOD PRESSURE: 115 MMHG

## 2020-04-21 DIAGNOSIS — I48.0 PAF (PAROXYSMAL ATRIAL FIBRILLATION) (HCC): ICD-10-CM

## 2020-04-21 DIAGNOSIS — I47.1 ATRIAL TACHYCARDIA (HCC): ICD-10-CM

## 2020-04-21 DIAGNOSIS — I49.3 PVC'S (PREMATURE VENTRICULAR CONTRACTIONS): Primary | ICD-10-CM

## 2020-04-21 PROCEDURE — 99442 PR PHYS/QHP TELEPHONE EVALUATION 11-20 MIN: CPT | Performed by: INTERNAL MEDICINE

## 2020-04-21 RX ORDER — PANTOPRAZOLE SODIUM 40 MG/1
40 TABLET, DELAYED RELEASE ORAL DAILY
Qty: 30 TABLET | Refills: 6 | Status: SHIPPED | OUTPATIENT
Start: 2020-04-21 | End: 2020-12-02

## 2020-04-21 RX ORDER — ASCORBIC ACID 500 MG
500 TABLET ORAL DAILY
COMMUNITY

## 2020-04-21 NOTE — PROGRESS NOTES
Betty Garg  1970  [phone]    04/21/2020    [facility] CARDIOLOGY     JacksonJob MD  [address]  [place] [id number]    Chief Complaint   Patient presents with   • Atrial Fibrillation   • PVC       Problem List:  1. Paroxysmal Atrial Fibrillation:   a. CHADSVasc = 0   b. 2 episodes of atrial fibrillation 8/2011 and 12/2012 with spontaneous conversion to normal sinus rhythm  c. Prn Rythmol  d. Normal echocardiogram 3/24/2016 with normal LVEF and no significant valvular heart disease  e. Normal GXT 7/28/2018 with heart rate reached at 189 bpm  f. Calcium artery score of 19.9 7/23/2018  2. Paroxysmal SVT/PVCs  a. 24 Hour Holter Monitor 7/31/19: NSR  bpm, average 80 bpm. Occasional PVCs. AT   3. Chest Pain Syndrome  a. Mild CAD calcification on CAC scan   b. Reported normal stress test 2018- data deficit  4. GERD  5. Anxiety      Allergies  Allergies   Allergen Reactions   • Penicillins Hives       Current Medications    Current Outpatient Medications:   •  aspirin 81 MG tablet, Take 81 mg by mouth daily. oral delayed release tablet, Disp: , Rfl:   •  cholecalciferol (VITAMIN D3) 1000 UNITS tablet, Take 5,000 Units by mouth Daily., Disp: , Rfl:   •  coenzyme Q10 100 MG capsule, Take 100 mg by mouth Daily., Disp: , Rfl:   •  Iron Combinations (IRON COMPLEX PO), Take  by mouth Daily., Disp: , Rfl:   •  magnesium, as, gluconate (MAGONATE) 500 (27 Mg) MG tablet, Take 1 tablet by mouth Daily., Disp: 30 tablet, Rfl: 3  •  nebivolol (BYSTOLIC) 2.5 MG tablet, Take 2.5 mg by mouth daily., Disp: , Rfl:   •  Potassium 99 MG tablet, Take 99 mg by mouth Daily., Disp: , Rfl:   •  Probiotic Product (PROBIOTIC DAILY PO), Take 1 tablet by mouth Daily., Disp: , Rfl:   •  rosuvastatin (CRESTOR) 20 MG tablet, Take 20 mg by mouth Daily., Disp: , Rfl:   •  vitamin C (ASCORBIC ACID) 500 MG tablet, Take 500 mg by mouth Daily., Disp: , Rfl:   •  Wheat Dextrin (BENEFIBER DRINK MIX  "PO), Take 1 bottle by mouth Daily., Disp: , Rfl:   •  Zinc 50 MG capsule, Take 50 mg by mouth Daily., Disp: , Rfl:     History of Present Illness     Pt presents for follow up of AF/PVC. Since we last saw the pt, pt could not get a prescription for her Nexium filled as her insurance refused to pay for it.  She does continue to have frequent amount of PVCs many times throughout the week.  They can occur both at rest and with exertion.  She is moderately symptomatic.  She does continue to have reflux as well.  Denies any hospitalizations, ER visits, bleeding, or TIA/CVA symptoms. Overall feels ok.  Blood pressure and heart rates have been stable at home.    ROS:  General:  Denies fatigue, weight gain or loss  Cardiovascular:  Denies CP, PND, syncope, near syncope, edema + palpitations.  Pulmonary:  Denies GOODWIN, cough, or wheezing      Vitals:    04/21/20 0958   BP: 115/71   BP Location: Left arm   Patient Position: Sitting   Pulse: 75   Weight: 59 kg (130 lb)   Height: 162.6 cm (64\")     Body mass index is 22.31 kg/m².      Diagnostic Data:      Procedures    1. PVC's (premature ventricular contractions)    2. Atrial tachycardia (CMS/HCC)    3. PAF (paroxysmal atrial fibrillation) (CMS/HCC)          Plan:  1. PVCs:  - symptomatic in nature.  Possible triggered by GERD.  Since her insurance will not cover Nexium, will initiate Protonix 40 mg p.o. daily.  She is to follow-up with her GI doctor.  In 2 to 3 weeks on the Protonix if her PVCs are not better I would initiate flecainide 100 mg p.o. twice daily with 12 EKG 48 hours later.  She can stop her Bystolic if she is started on flecainide in the near future.     2. PAF:  - overall controlled with no recurrences.  As per #1, consider Flecainide and if Flecainide is started, she would not use Rythmol anymore.    You have chosen to receive care through a telephone visit. Do you consent to use a telephone visit for your medical care today? Yes    This visit has been " rescheduled as a phone visit to comply with patient safety concerns in accordance with CDC recommendations. Total time of discussion/chart preparation was 13 minutes.    F/up in 6 months

## 2020-07-31 ENCOUNTER — TELEPHONE (OUTPATIENT)
Dept: CARDIOLOGY | Facility: CLINIC | Age: 50
End: 2020-07-31

## 2020-07-31 NOTE — TELEPHONE ENCOUNTER
Pt called back she is having PVC's again and the protonix is not helping like it initially did. She is afraid to take Flecainide and would rather take more beta blocker. She is currently on bystolic 2.5mg 1 daily.

## 2020-08-21 ENCOUNTER — TELEPHONE (OUTPATIENT)
Dept: FAMILY MEDICINE CLINIC | Facility: CLINIC | Age: 50
End: 2020-08-21

## 2020-08-21 NOTE — TELEPHONE ENCOUNTER
PT CALLED STATING OVER THE LAST FEW MONTHS SHE HAS BEEN HAVING HEART PALPITATIONS AND PVC. SHE HAS SEEN A CARDIOLOGIST, WHO IS WANTING HER TO START AN ANTI-RHYTHMIC MEDICINE. SHE IS ON A BETA BLOCKER ALREADY, AND HE HAS UPPED HER DOSAGE OF THAT BUT IT DIDN'T WORK.    SHE WANTS TO DISCUSS THIS WITH DR. CHÁVEZ BEFORE SHE STARTS TAKING ANYTHING NEW. SHE STATES NO BLOOD WORK HAS BEEN DONE TO SEE IF THERE MAY BE ANY UNDERLYING CAUSE THERE WITH VITAMINS OR HORMONES.    PLEASE ADVISE.    CALLBACK NUMBER: [phone]

## 2020-08-25 ENCOUNTER — OFFICE VISIT (OUTPATIENT)
Dept: FAMILY MEDICINE CLINIC | Facility: CLINIC | Age: 50
End: 2020-08-25

## 2020-08-25 VITALS
TEMPERATURE: 97.6 F | HEIGHT: 64 IN | DIASTOLIC BLOOD PRESSURE: 68 MMHG | WEIGHT: 127 LBS | BODY MASS INDEX: 21.68 KG/M2 | RESPIRATION RATE: 14 BRPM | HEART RATE: 78 BPM | SYSTOLIC BLOOD PRESSURE: 108 MMHG | OXYGEN SATURATION: 98 %

## 2020-08-25 DIAGNOSIS — I48.0 PAROXYSMAL ATRIAL FIBRILLATION (HCC): ICD-10-CM

## 2020-08-25 DIAGNOSIS — R00.2 PALPITATIONS: Primary | ICD-10-CM

## 2020-08-25 PROCEDURE — 99213 OFFICE O/P EST LOW 20 MIN: CPT | Performed by: FAMILY MEDICINE

## 2020-08-25 NOTE — PROGRESS NOTES
Subjective   Betty Garg is a 50 y.o. female.   Palpitations (has been to cardio )    Patient here to follow up on heart palpitations and paroxysmal A. fib.  She has been to her cardiologist to follow up on this as well and just wants to get Dr. Jackson's opinion  on it .     History of Present Illness   Sofiya has a long history of dealing with heart palpitations.  She has been diagnosed in the past with paroxysmal atrial fibrillation she also has been recently diagnosed with frequent PVCs.   has advised taking flecainide and she is just wants to know if I agree with that plan.  She has had a sleep apnea study in the past, and she admits that she does drink wine and enjoy that with her friends.  She feels like she does not drink nearly as much as she did when she was younger and she is trying not to overindulge in alcohol.  She is just kind of worried why these things happened all of a sudden but is now been a while since these began and I have tried explained to her that we do not really know why some people get these heart arrhythmias and others do not but she seems to be doing well with it and to stop alcohol entirely and to take the flecainide sounds like a good idea.  The following portions of the patient's history were reviewed and updated as appropriate: allergies, current medications, past family history, past medical history, past social history, past surgical history and problem list.    Review of Systems   Constitutional: Negative.    HENT: Negative.    Eyes: Negative.    Respiratory: Negative.    Cardiovascular: Positive for palpitations.   Genitourinary: Negative.    Skin: Negative.    Neurological: Negative.    Psychiatric/Behavioral: The patient is nervous/anxious.        Objective   Physical Exam   Constitutional: She is oriented to person, place, and time. She appears well-developed and well-nourished.   HENT:   Head: Normocephalic and atraumatic.   Eyes: Pupils are equal, round, and  reactive to light. EOM are normal.   Neck: Normal range of motion.   Cardiovascular: Normal rate and regular rhythm.   Pulmonary/Chest: Effort normal.   Neurological: She is alert and oriented to person, place, and time.   Skin: Skin is warm and dry. No rash noted.   Psychiatric: She has a normal mood and affect. Her behavior is normal.   Nursing note and vitals reviewed.        Assessment/Plan   Problem List Items Addressed This Visit        Cardiovascular and Mediastinum    Palpitations - Primary    Paroxysmal atrial fibrillation (CMS/HCC)  I have encouraged her to follow  advice and take flecainide as prescribed and see how she feels.  I have let her know that she can always stop his medication if he does like how she feels on it.    Overview     Overview:   CHADS =0                    Return for Next scheduled follow up.

## 2020-08-31 ENCOUNTER — OFFICE VISIT (OUTPATIENT)
Dept: FAMILY MEDICINE CLINIC | Facility: CLINIC | Age: 50
End: 2020-08-31

## 2020-08-31 VITALS
HEART RATE: 72 BPM | BODY MASS INDEX: 21.68 KG/M2 | HEIGHT: 64 IN | RESPIRATION RATE: 14 BRPM | TEMPERATURE: 97.8 F | DIASTOLIC BLOOD PRESSURE: 68 MMHG | WEIGHT: 127 LBS | OXYGEN SATURATION: 99 % | SYSTOLIC BLOOD PRESSURE: 118 MMHG

## 2020-08-31 DIAGNOSIS — B02.9 HERPES ZOSTER WITHOUT COMPLICATION: Primary | ICD-10-CM

## 2020-08-31 PROCEDURE — 99213 OFFICE O/P EST LOW 20 MIN: CPT | Performed by: NURSE PRACTITIONER

## 2020-08-31 RX ORDER — VALACYCLOVIR HYDROCHLORIDE 1 G/1
1000 TABLET, FILM COATED ORAL 3 TIMES DAILY
Qty: 15 TABLET | Refills: 0 | Status: SHIPPED | OUTPATIENT
Start: 2020-08-31 | End: 2020-09-01

## 2020-08-31 NOTE — PATIENT INSTRUCTIONS
Shingles    Shingles, which is also known as herpes zoster, is an infection that causes a painful skin rash and fluid-filled blisters. It is caused by a virus.  Shingles only develops in people who:  · Have had chickenpox.  · Have been given a medicine to protect against chickenpox (have been vaccinated). Shingles is rare in this group.  What are the causes?  Shingles is caused by varicella-zoster virus (VZV). This is the same virus that causes chickenpox. After a person is exposed to VZV, the virus stays in the body in an inactive (dormant) state. Shingles develops if the virus is reactivated. This can happen many years after the first (initial) exposure to VZV. It is not known what causes this virus to be reactivated.  What increases the risk?  People who have had chickenpox or received the chickenpox vaccine are at risk for shingles. Shingles infection is more common in people who:  · Are older than age 60.  · Have a weakened disease-fighting system (immune system), such as people with:  ? HIV.  ? AIDS.  ? Cancer.  · Are taking medicines that weaken the immune system, such as transplant medicines.  · Are experiencing a lot of stress.  What are the signs or symptoms?  Early symptoms of this condition include itching, tingling, and pain in an area on your skin. Pain may be described as burning, stabbing, or throbbing.  A few days or weeks after early symptoms start, a painful red rash appears. The rash is usually on one side of the body and has a band-like or belt-like pattern. The rash eventually turns into fluid-filled blisters that break open, change into scabs, and dry up in about 2-3 weeks.  At any time during the infection, you may also develop:  · A fever.  · Chills.  · A headache.  · An upset stomach.  How is this diagnosed?  This condition is diagnosed with a skin exam. Skin or fluid samples may be taken from the blisters before a diagnosis is made. These samples are examined under a microscope or sent  to a lab for testing.  How is this treated?  The rash may last for several weeks. There is not a specific cure for this condition. Your health care provider will probably prescribe medicines to help you manage pain, recover more quickly, and avoid long-term problems. Medicines may include:  · Antiviral drugs.  · Anti-inflammatory drugs.  · Pain medicines.  · Anti-itching medicines (antihistamines).  If the area involved is on your face, you may be referred to a specialist, such as an eye doctor (ophthalmologist) or an ear, nose, and throat (ENT) doctor (otolaryngologist) to help you avoid eye problems, chronic pain, or disability.  Follow these instructions at home:  Medicines  · Take over-the-counter and prescription medicines only as told by your health care provider.  · Apply an anti-itch cream or numbing cream to the affected area as told by your health care provider.  Relieving itching and discomfort    · Apply cold, wet cloths (cold compresses) to the area of the rash or blisters as told by your health care provider.  · Cool baths can be soothing. Try adding baking soda or dry oatmeal to the water to reduce itching. Do not bathe in hot water.  Blister and rash care  · Keep your rash covered with a loose bandage (dressing). Wear loose-fitting clothing to help ease the pain of material rubbing against the rash.  · Keep your rash and blisters clean by washing the area with mild soap and cool water as told by your health care provider.  · Check your rash every day for signs of infection. Check for:  ? More redness, swelling, or pain.  ? Fluid or blood.  ? Warmth.  ? Pus or a bad smell.  · Do not scratch your rash or pick at your blisters. To help avoid scratching:  ? Keep your fingernails clean and cut short.  ? Wear gloves or mittens while you sleep, if scratching is a problem.  General instructions  · Rest as told by your health care provider.  · Keep all follow-up visits as told by your health care provider.  This is important.  · Wash your hands often with soap and water. If soap and water are not available, use hand . Doing this lowers your chance of getting a bacterial skin infection.  · Before your blisters change into scabs, your shingles infection can cause chickenpox in people who have never had it or have never been vaccinated against it. To prevent this from happening, avoid contact with other people, especially:  ? Babies.  ? Pregnant women.  ? Children who have eczema.  ? Elderly people who have transplants.  ? People who have chronic illnesses, such as cancer or AIDS.  Contact a health care provider if:  · Your pain is not relieved with prescribed medicines.  · Your pain does not get better after the rash heals.  · You have signs of infection in the rash area, such as:  ? More redness, swelling, or pain around the rash.  ? Fluid or blood coming from the rash.  ? The rash area feeling warm to the touch.  ? Pus or a bad smell coming from the rash.  Get help right away if:  · The rash is on your face or nose.  · You have facial pain, pain around your eye area, or loss of feeling on one side of your face.  · You have difficulty seeing.  · You have ear pain or have ringing in your ear.  · You have a loss of taste.  · Your condition gets worse.  Summary  · Shingles, which is also known as herpes zoster, is an infection that causes a painful skin rash and fluid-filled blisters.  · This condition is diagnosed with a skin exam. Skin or fluid samples may be taken from the blisters and examined before the diagnosis is made.  · Keep your rash covered with a loose bandage (dressing). Wear loose-fitting clothing to help ease the pain of material rubbing against the rash.  · Before your blisters change into scabs, your shingles infection can cause chickenpox in people who have never had it or have never been vaccinated against it.  This information is not intended to replace advice given to you by your health care  provider. Make sure you discuss any questions you have with your health care provider.  Document Released: 12/18/2006 Document Revised: 04/10/2020 Document Reviewed: 08/22/2018  Elsevier Patient Education © 2020 [employer]

## 2020-08-31 NOTE — PROGRESS NOTES
Subjective   Betty Garg is a 50 y.o. female.     History of Present Illness   Patient here for possible shingles. She reports that she noticed the rash on her back last Tuesday and that it had bumps.  She reports that she has been putting apple cider vinegar on site and it has helped. Pt reports pain and intermittently     The following portions of the patient's history were reviewed and updated as appropriate: allergies, current medications, past family history, past medical history, past social history, past surgical history and problem list.    Review of Systems   Constitutional: Negative for chills, fatigue and fever.   Respiratory: Negative for cough and shortness of breath.    Cardiovascular: Negative for chest pain, palpitations and leg swelling.   Musculoskeletal: Negative for arthralgias and joint swelling.   Skin: Positive for rash and skin lesions. Negative for color change, dry skin, pallor and bruise.        Rash to upper left back.    Allergic/Immunologic: Negative.    Neurological: Negative for dizziness, weakness and headache.       Objective   Physical Exam   Constitutional: She is oriented to person, place, and time. She appears well-developed and well-nourished.   HENT:   Head: Normocephalic and atraumatic.   Eyes: Pupils are equal, round, and reactive to light. Conjunctivae and EOM are normal.   Neck: Normal range of motion. Neck supple.   Cardiovascular: Normal rate, regular rhythm, normal heart sounds and intact distal pulses.   No murmur heard.  Pulmonary/Chest: Effort normal and breath sounds normal.   Musculoskeletal: She exhibits no deformity.   Lymphadenopathy:     She has no cervical adenopathy.   Neurological: She is alert and oriented to person, place, and time.   Skin: Skin is warm, dry and intact. Lesion and rash noted. There is erythema.   Raised erythematous rash to upper left shoulder, papular with some blisters and scabbing.    Psychiatric: She has a normal mood and  affect. Her behavior is normal. Judgment and thought content normal.   Nursing note and vitals reviewed.      Vitals:    08/31/20 1138   BP: 118/68   Pulse: 72   Resp: 14   Temp: 97.8 °F (36.6 °C)   SpO2: 99%     Body mass index is 21.8 kg/m².    Procedures    Assessment/Plan   Problems Addressed this Visit     None      Visit Diagnoses     Herpes zoster without complication    -  Primary    Relevant Medications    valACYclovir (Valtrex) 1000 MG tablet        Return if symptoms worsen or fail to improve.         Patient Instructions     Shingles    Shingles, which is also known as herpes zoster, is an infection that causes a painful skin rash and fluid-filled blisters. It is caused by a virus.  Shingles only develops in people who:  · Have had chickenpox.  · Have been given a medicine to protect against chickenpox (have been vaccinated). Shingles is rare in this group.  What are the causes?  Shingles is caused by varicella-zoster virus (VZV). This is the same virus that causes chickenpox. After a person is exposed to VZV, the virus stays in the body in an inactive (dormant) state. Shingles develops if the virus is reactivated. This can happen many years after the first (initial) exposure to VZV. It is not known what causes this virus to be reactivated.  What increases the risk?  People who have had chickenpox or received the chickenpox vaccine are at risk for shingles. Shingles infection is more common in people who:  · Are older than age 60.  · Have a weakened disease-fighting system (immune system), such as people with:  ? HIV.  ? AIDS.  ? Cancer.  · Are taking medicines that weaken the immune system, such as transplant medicines.  · Are experiencing a lot of stress.  What are the signs or symptoms?  Early symptoms of this condition include itching, tingling, and pain in an area on your skin. Pain may be described as burning, stabbing, or throbbing.  A few days or weeks after early symptoms start, a painful red  rash appears. The rash is usually on one side of the body and has a band-like or belt-like pattern. The rash eventually turns into fluid-filled blisters that break open, change into scabs, and dry up in about 2-3 weeks.  At any time during the infection, you may also develop:  · A fever.  · Chills.  · A headache.  · An upset stomach.  How is this diagnosed?  This condition is diagnosed with a skin exam. Skin or fluid samples may be taken from the blisters before a diagnosis is made. These samples are examined under a microscope or sent to a lab for testing.  How is this treated?  The rash may last for several weeks. There is not a specific cure for this condition. Your health care provider will probably prescribe medicines to help you manage pain, recover more quickly, and avoid long-term problems. Medicines may include:  · Antiviral drugs.  · Anti-inflammatory drugs.  · Pain medicines.  · Anti-itching medicines (antihistamines).  If the area involved is on your face, you may be referred to a specialist, such as an eye doctor (ophthalmologist) or an ear, nose, and throat (ENT) doctor (otolaryngologist) to help you avoid eye problems, chronic pain, or disability.  Follow these instructions at home:  Medicines  · Take over-the-counter and prescription medicines only as told by your health care provider.  · Apply an anti-itch cream or numbing cream to the affected area as told by your health care provider.  Relieving itching and discomfort    · Apply cold, wet cloths (cold compresses) to the area of the rash or blisters as told by your health care provider.  · Cool baths can be soothing. Try adding baking soda or dry oatmeal to the water to reduce itching. Do not bathe in hot water.  Blister and rash care  · Keep your rash covered with a loose bandage (dressing). Wear loose-fitting clothing to help ease the pain of material rubbing against the rash.  · Keep your rash and blisters clean by washing the area with mild  soap and cool water as told by your health care provider.  · Check your rash every day for signs of infection. Check for:  ? More redness, swelling, or pain.  ? Fluid or blood.  ? Warmth.  ? Pus or a bad smell.  · Do not scratch your rash or pick at your blisters. To help avoid scratching:  ? Keep your fingernails clean and cut short.  ? Wear gloves or mittens while you sleep, if scratching is a problem.  General instructions  · Rest as told by your health care provider.  · Keep all follow-up visits as told by your health care provider. This is important.  · Wash your hands often with soap and water. If soap and water are not available, use hand . Doing this lowers your chance of getting a bacterial skin infection.  · Before your blisters change into scabs, your shingles infection can cause chickenpox in people who have never had it or have never been vaccinated against it. To prevent this from happening, avoid contact with other people, especially:  ? Babies.  ? Pregnant women.  ? Children who have eczema.  ? Elderly people who have transplants.  ? People who have chronic illnesses, such as cancer or AIDS.  Contact a health care provider if:  · Your pain is not relieved with prescribed medicines.  · Your pain does not get better after the rash heals.  · You have signs of infection in the rash area, such as:  ? More redness, swelling, or pain around the rash.  ? Fluid or blood coming from the rash.  ? The rash area feeling warm to the touch.  ? Pus or a bad smell coming from the rash.  Get help right away if:  · The rash is on your face or nose.  · You have facial pain, pain around your eye area, or loss of feeling on one side of your face.  · You have difficulty seeing.  · You have ear pain or have ringing in your ear.  · You have a loss of taste.  · Your condition gets worse.  Summary  · Shingles, which is also known as herpes zoster, is an infection that causes a painful skin rash and fluid-filled  blisters.  · This condition is diagnosed with a skin exam. Skin or fluid samples may be taken from the blisters and examined before the diagnosis is made.  · Keep your rash covered with a loose bandage (dressing). Wear loose-fitting clothing to help ease the pain of material rubbing against the rash.  · Before your blisters change into scabs, your shingles infection can cause chickenpox in people who have never had it or have never been vaccinated against it.  This information is not intended to replace advice given to you by your health care provider. Make sure you discuss any questions you have with your health care provider.  Document Released: 12/18/2006 Document Revised: 04/10/2020 Document Reviewed: 08/22/2018  Elsevier Patient Education © 2020 [employer]

## 2020-09-01 DIAGNOSIS — B02.9 HERPES ZOSTER WITHOUT COMPLICATION: ICD-10-CM

## 2020-09-01 RX ORDER — VALACYCLOVIR HYDROCHLORIDE 1 G/1
TABLET, FILM COATED ORAL
Qty: 15 TABLET | Refills: 0 | Status: SHIPPED | OUTPATIENT
Start: 2020-09-01 | End: 2020-10-14

## 2020-09-09 ENCOUNTER — TELEPHONE (OUTPATIENT)
Dept: CARDIOLOGY | Facility: CLINIC | Age: 50
End: 2020-09-09

## 2020-09-09 DIAGNOSIS — I49.3 PVC (PREMATURE VENTRICULAR CONTRACTION): Primary | ICD-10-CM

## 2020-09-09 RX ORDER — FLECAINIDE ACETATE 100 MG/1
100 TABLET ORAL 2 TIMES DAILY
Qty: 60 TABLET | Refills: 5 | Status: SHIPPED | OUTPATIENT
Start: 2020-09-09 | End: 2021-07-14

## 2020-09-09 NOTE — TELEPHONE ENCOUNTER
Pt called her PVC are no better after Protonix since April 2020. Per [facility] last office visit start flecainide 100mg 1 BID with EKG 48hrs and stop bystolic.

## 2020-09-28 ENCOUNTER — TELEPHONE (OUTPATIENT)
Dept: FAMILY MEDICINE CLINIC | Facility: CLINIC | Age: 50
End: 2020-09-28

## 2020-10-07 DIAGNOSIS — Z11.59 NEED FOR HEPATITIS C SCREENING TEST: Primary | ICD-10-CM

## 2020-10-07 DIAGNOSIS — Z00.00 ROUTINE GENERAL MEDICAL EXAMINATION AT A HEALTH CARE FACILITY: ICD-10-CM

## 2020-10-07 DIAGNOSIS — Z13.220 SCREENING FOR LIPOID DISORDERS: ICD-10-CM

## 2020-10-13 LAB
ALBUMIN SERPL-MCNC: 4.7 G/DL (ref 3.5–5.2)
ALBUMIN/GLOB SERPL: 3.1 G/DL
ALP SERPL-CCNC: 51 U/L (ref 39–117)
ALT SERPL-CCNC: 16 U/L (ref 1–33)
AST SERPL-CCNC: 21 U/L (ref 1–32)
BILIRUB SERPL-MCNC: 0.6 MG/DL (ref 0–1.2)
BUN SERPL-MCNC: 12 MG/DL (ref 6–20)
BUN/CREAT SERPL: 18.8 (ref 7–25)
CALCIUM SERPL-MCNC: 9 MG/DL (ref 8.6–10.5)
CHLORIDE SERPL-SCNC: 103 MMOL/L (ref 98–107)
CHOLEST SERPL-MCNC: 102 MG/DL (ref 0–200)
CO2 SERPL-SCNC: 27.2 MMOL/L (ref 22–29)
CREAT SERPL-MCNC: 0.64 MG/DL (ref 0.57–1)
ERYTHROCYTE [DISTWIDTH] IN BLOOD BY AUTOMATED COUNT: 11.9 % (ref 12.3–15.4)
GLOBULIN SER CALC-MCNC: 1.5 GM/DL
GLUCOSE SERPL-MCNC: 94 MG/DL (ref 65–99)
HCT VFR BLD AUTO: 39.1 % (ref 34–46.6)
HCV AB S/CO SERPL IA: <0.1 S/CO RATIO (ref 0–0.9)
HCV AB SERPL QL IA: NORMAL
HDLC SERPL-MCNC: 62 MG/DL (ref 40–60)
HGB BLD-MCNC: 13.1 G/DL (ref 12–15.9)
LDLC SERPL CALC-MCNC: 29 MG/DL (ref 0–100)
LDLC/HDLC SERPL: 0.52 {RATIO}
MCH RBC QN AUTO: 32.8 PG (ref 26.6–33)
MCHC RBC AUTO-ENTMCNC: 33.5 G/DL (ref 31.5–35.7)
MCV RBC AUTO: 97.8 FL (ref 79–97)
PLATELET # BLD AUTO: 240 10*3/MM3 (ref 140–450)
POTASSIUM SERPL-SCNC: 4.1 MMOL/L (ref 3.5–5.2)
PROT SERPL-MCNC: 6.2 G/DL (ref 6–8.5)
RBC # BLD AUTO: 4 10*6/MM3 (ref 3.77–5.28)
SODIUM SERPL-SCNC: 137 MMOL/L (ref 136–145)
TRIGL SERPL-MCNC: 40 MG/DL (ref 0–150)
VLDLC SERPL CALC-MCNC: 11 MG/DL (ref 5–40)
WBC # BLD AUTO: 5.74 10*3/MM3 (ref 3.4–10.8)

## 2020-10-14 ENCOUNTER — OFFICE VISIT (OUTPATIENT)
Dept: FAMILY MEDICINE CLINIC | Facility: CLINIC | Age: 50
End: 2020-10-14

## 2020-10-14 VITALS
HEART RATE: 85 BPM | DIASTOLIC BLOOD PRESSURE: 70 MMHG | BODY MASS INDEX: 22.02 KG/M2 | WEIGHT: 129 LBS | TEMPERATURE: 97.9 F | HEIGHT: 64 IN | OXYGEN SATURATION: 99 % | RESPIRATION RATE: 16 BRPM | SYSTOLIC BLOOD PRESSURE: 120 MMHG

## 2020-10-14 DIAGNOSIS — Z00.00 ROUTINE GENERAL MEDICAL EXAMINATION AT A HEALTH CARE FACILITY: Primary | ICD-10-CM

## 2020-10-14 DIAGNOSIS — Z23 NEED FOR IMMUNIZATION AGAINST INFLUENZA: ICD-10-CM

## 2020-10-14 DIAGNOSIS — I48.0 PAROXYSMAL ATRIAL FIBRILLATION (HCC): ICD-10-CM

## 2020-10-14 DIAGNOSIS — K21.9 GASTROESOPHAGEAL REFLUX DISEASE WITHOUT ESOPHAGITIS: ICD-10-CM

## 2020-10-14 PROCEDURE — 99396 PREV VISIT EST AGE 40-64: CPT | Performed by: FAMILY MEDICINE

## 2020-10-14 PROCEDURE — 90686 IIV4 VACC NO PRSV 0.5 ML IM: CPT | Performed by: FAMILY MEDICINE

## 2020-10-14 PROCEDURE — 90471 IMMUNIZATION ADMIN: CPT | Performed by: FAMILY MEDICINE

## 2020-10-14 NOTE — PROGRESS NOTES
"Preventive Exam    History of Present Illness: Betty is here for check up and review of routine health maintenance. She states she is doing well and has no concerns.  Chronic afib and is followed by cardiology and very faithful with her follow-up visits.  She is on medication to help manage her irregular heartbeat and she is tolerating well on medication   She also has chronic reflux and has been taking tonics to help manage the symptoms.  Because of her cardiac disease she is also on Crestor and tolerating well.  Pap Smear:UTD  Mammogram:UTD  Colon cancer screenin  Tobacco use :non smoker      REVIEW OF SYSTEMS  Constitutional: Negative.    HENT: Negative.    Eyes: Negative.    Respiratory: Negative.    Cardiovascular: Negative.    Gastrointestinal: Negative.    Endocrine: Negative.    Genitourinary: Negative.    Musculoskeletal: Negative.  Skin: Negative.    Allergic/Immunologic: Negative.    Neurological: Negative.    Hematological: Negative.    Psychiatric/Behavioral: Negative.    I have reviewed the ROS as documented by the MA. Job Jackson MD       PHYSICAL EXAM    Vitals:    10/14/20 1113   BP: 120/70   Pulse: 85   Resp: 16   Temp: 97.9 °F (36.6 °C)   SpO2: 99%   Weight: 58.5 kg (129 lb)   Height: 162.6 cm (64\")     GENERAL: alert and oriented, afebrile and vital signs stable  HEENT: oral mucosa moist, PEERLA, EOM, conjunctiva normal  No cervical adenopathy  LUNGS: clear to ascultation bilaterally, no rales, ronchi or wheezing  HEART: RRR S1 S2 without murmers, thrills, rubs or gallops  CHEST WALL: within normal limits, no tenderness  ABDOMEN: WNL. Normal BS.  EXTREMITIES: No clubbing, cyanosis or edema noted. Normal Pulses.  SKIN: warm, dry, no rashes noted  NEURO: CN II- XII grossly intact  PSYCH: Good mood and positive affect  ASSESSMENT AND PLAN  Problem List Items Addressed This Visit        Cardiovascular and Mediastinum    Paroxysmal atrial fibrillation (CMS/HCC)    Overview     Overview: "   Followed by cardiology and doing very well on current medication.  She is in the best space have seen her in a long time.  CHADS =0            Digestive    Gastroesophageal reflux disease  She is taking Protonix daily and feels that this makes a big difference and plans to continue to take this medication.      Other Visit Diagnoses     Routine general medical examination at a health care facility    -  Primary    Need for immunization against influenza        Relevant Orders    FluLaval Quad >6 Months (8951-3102) (Completed)        Routine health maintenance reviewed and discussed with Betty.      Return in about 1 year (around 10/14/2021).

## 2020-10-14 NOTE — PATIENT INSTRUCTIONS
Annual Wellness  Personal Prevention Plan of Service     Date of Office Visit:  10/14/2020  Encounter Provider:  Job Jackson MD  Place of Service:  [facility]  Patient Name: Betty Garg  :  1970    As part of the Annual Wellness portion of your visit today, we are providing you with this personalized preventive plan of services (PPPS). This plan is based upon recommendations of the [place] Preventive Services Task Force (USPSTF) and the Advisory Committee on Immunization Practices (ACIP).    This lists the preventive care services that should be considered, and provides dates of when you are due. Items listed as completed are up-to-date and do not require any further intervention.    Health Maintenance   Topic Date Due   • LIPID PANEL  10/12/2021   • ANNUAL PHYSICAL  10/15/2021   • PAP SMEAR  2023   • TDAP/TD VACCINES (3 - Td) 2028   • COLONOSCOPY  2029   • HEPATITIS C SCREENING  Completed   • INFLUENZA VACCINE  Completed   • Pneumococcal Vaccine 0-64  Aged Out       No orders of the defined types were placed in this encounter.      Return in about 1 year (around 10/14/2021).

## 2020-10-19 ENCOUNTER — TELEMEDICINE (OUTPATIENT)
Dept: FAMILY MEDICINE CLINIC | Facility: CLINIC | Age: 50
End: 2020-10-19

## 2020-10-19 DIAGNOSIS — J02.9 SORE THROAT: Primary | ICD-10-CM

## 2020-10-19 PROCEDURE — 99213 OFFICE O/P EST LOW 20 MIN: CPT | Performed by: FAMILY MEDICINE

## 2020-10-19 NOTE — PROGRESS NOTES
VIDEO VISIT  Betty Garg is 50 y.o. and is seen via video today for:     Chief Complaint   Patient presents with   • Sore Throat       HPI     Seen in parking lot.  Got a flu shot last week, developed a sore throat a few days later.  Noticed some white blisters on the back of her throat a couple days ago.  Also noticed a sore throat.  Blisters have since resolved however the sore throat remains.  Also feeling some general malaise and aches.  Had a negative Covid test recently.  Has had a history of some canker sores in her mouth in the past.    The following portions of the patient's history were reviewed and updated as appropriate: allergies, current medications, past family history, past medical history, past social history, past surgical history and problem list.    Review of Systems   Constitutional: Positive for fatigue. Negative for activity change, chills and fever.   HENT: Positive for mouth sores, rhinorrhea and sore throat. Negative for trouble swallowing.    Musculoskeletal: Positive for myalgias. Negative for arthralgias.         Objective  There were no vitals filed for this visit.  There is no height or weight on file to calculate BMI.    Physical Exam  Constitutional:       Appearance: Normal appearance.   HENT:      Nose: Nose normal.      Mouth/Throat:      Mouth: Mucous membranes are moist.      Pharynx: Oropharynx is clear. Posterior oropharyngeal erythema present. No oropharyngeal exudate.   Eyes:      Extraocular Movements: Extraocular movements intact.      Conjunctiva/sclera: Conjunctivae normal.      Pupils: Pupils are equal, round, and reactive to light.   Neck:      Musculoskeletal: Normal range of motion.   Neurological:      Mental Status: She is alert and oriented to person, place, and time. Mental status is at baseline.           Current Outpatient Medications:   •  aspirin 81 MG tablet, Take 81 mg by mouth daily. oral delayed release tablet, Disp: , Rfl:   •  cholecalciferol  (VITAMIN D3) 1000 UNITS tablet, Take 5,000 Units by mouth Daily., Disp: , Rfl:   •  coenzyme Q10 100 MG capsule, Take 100 mg by mouth Daily., Disp: , Rfl:   •  flecainide (TAMBOCOR) 100 MG tablet, Take 1 tablet by mouth 2 (Two) Times a Day., Disp: 60 tablet, Rfl: 5  •  Iron Combinations (IRON COMPLEX PO), Take  by mouth Daily., Disp: , Rfl:   •  magnesium, as, gluconate (MAGONATE) 500 (27 Mg) MG tablet, Take 1 tablet by mouth Daily., Disp: 30 tablet, Rfl: 3  •  pantoprazole (Protonix) 40 MG EC tablet, Take 1 tablet by mouth Daily., Disp: 30 tablet, Rfl: 6  •  Potassium 99 MG tablet, Take 99 mg by mouth Daily., Disp: , Rfl:   •  Probiotic Product (PROBIOTIC DAILY PO), Take 1 tablet by mouth Daily., Disp: , Rfl:   •  rosuvastatin (CRESTOR) 20 MG tablet, Take 20 mg by mouth Daily., Disp: , Rfl:   •  vitamin C (ASCORBIC ACID) 500 MG tablet, Take 500 mg by mouth Daily., Disp: , Rfl:   •  Wheat Dextrin (BENEFIBER DRINK MIX PO), Take 1 bottle by mouth Daily., Disp: , Rfl:   •  Zinc 50 MG capsule, Take 50 mg by mouth Daily., Disp: , Rfl:   Current outpatient and discharge medications have been reconciled for the patient.  Reviewed by: Osmani Cardoso MD      Procedures    Lab Results (most recent)     None                  Diagnoses and all orders for this visit:    1. Sore throat (Primary)    Discussed various possible etiologies of sore throat. Given history I'd lean towards herpangina or the like. Should resolve with time, recommended conservative management and symptom control. She'll let us know if she doesn't get to feeling better in the next couple days.     Any information loaded into the AVS was placed there by CINTIA Cardoso MD, and patient was counseled on the same.   Return if symptoms worsen or fail to improve.      CINTIA Cardoso MD

## 2020-10-29 ENCOUNTER — OFFICE VISIT (OUTPATIENT)
Dept: CARDIOLOGY | Facility: CLINIC | Age: 50
End: 2020-10-29

## 2020-10-29 VITALS
WEIGHT: 127 LBS | HEART RATE: 82 BPM | OXYGEN SATURATION: 99 % | SYSTOLIC BLOOD PRESSURE: 110 MMHG | BODY MASS INDEX: 21.68 KG/M2 | DIASTOLIC BLOOD PRESSURE: 70 MMHG | HEIGHT: 64 IN

## 2020-10-29 DIAGNOSIS — I49.3 PVCS (PREMATURE VENTRICULAR CONTRACTIONS): Primary | ICD-10-CM

## 2020-10-29 DIAGNOSIS — I48.0 PAROXYSMAL ATRIAL FIBRILLATION (HCC): ICD-10-CM

## 2020-10-29 PROCEDURE — 93000 ELECTROCARDIOGRAM COMPLETE: CPT | Performed by: INTERNAL MEDICINE

## 2020-10-29 PROCEDURE — 99213 OFFICE O/P EST LOW 20 MIN: CPT | Performed by: INTERNAL MEDICINE

## 2020-10-29 NOTE — PROGRESS NOTES
Betty Garg  1970  [phone]    10/29/2020    [facility] CARDIOLOGY     JacksonJob MD  [address]  [place] [id number]    Chief Complaint   Patient presents with   • premature ventricular contractions       Problem List:     1. Paroxysmal Atrial Fibrillation:   a. CHADSVasc = 0   b. 2 episodes of atrial fibrillation 8/2011 and 12/2012 with spontaneous conversion to normal sinus rhythm  c. Prn Rythmol  d. Normal echocardiogram 3/24/2016 with normal LVEF and no significant valvular heart disease  e. Normal GXT 7/28/2018 with heart rate reached at 189 bpm  f. Calcium artery score of 19.9 7/23/2018  2. Paroxysmal SVT/PVCs  a. 24 Hour Holter Monitor 7/31/19: NSR  bpm, average 80 bpm. Occasional PVCs. AT   3. Chest Pain Syndrome  a. Mild CAD calcification on CAC scan   b. Reported normal stress test 2018- data deficit  4. GERD  5. Anxiety    Allergies  Allergies   Allergen Reactions   • Penicillins Hives       Current Medications    Current Outpatient Medications:   •  aspirin 81 MG tablet, Take 81 mg by mouth daily. oral delayed release tablet, Disp: , Rfl:   •  cholecalciferol (VITAMIN D3) 1000 UNITS tablet, Take 5,000 Units by mouth Daily., Disp: , Rfl:   •  coenzyme Q10 100 MG capsule, Take 100 mg by mouth Daily., Disp: , Rfl:   •  flecainide (TAMBOCOR) 100 MG tablet, Take 1 tablet by mouth 2 (Two) Times a Day. (Patient taking differently: Take 50 mg by mouth 2 (Two) Times a Day.), Disp: 60 tablet, Rfl: 5  •  Iron Combinations (IRON COMPLEX PO), Take  by mouth Daily., Disp: , Rfl:   •  magnesium, as, gluconate (MAGONATE) 500 (27 Mg) MG tablet, Take 1 tablet by mouth Daily., Disp: 30 tablet, Rfl: 3  •  pantoprazole (Protonix) 40 MG EC tablet, Take 1 tablet by mouth Daily., Disp: 30 tablet, Rfl: 6  •  Potassium 99 MG tablet, Take 99 mg by mouth Daily., Disp: , Rfl:   •  Probiotic Product (PROBIOTIC DAILY PO), Take 1 tablet by mouth Daily., Disp: , Rfl:   •   "rosuvastatin (CRESTOR) 20 MG tablet, Take 20 mg by mouth Daily., Disp: , Rfl:   •  vitamin C (ASCORBIC ACID) 500 MG tablet, Take 500 mg by mouth Daily., Disp: , Rfl:   •  Wheat Dextrin (BENEFIBER DRINK MIX PO), Take 1 bottle by mouth Daily., Disp: , Rfl:   •  Zinc 50 MG capsule, Take 50 mg by mouth Daily., Disp: , Rfl:     History of Present Illness:     Pt presents for follow up of PVCs, PAF. Since we last saw the pt, she is now taking Flecainide. She states that her PVCs are better on Flecainide. She occasionally feels her heart racing, different than her atrial fibrillation, lasting 30-40 minutes. Goes away after she drinks water and rests. It makes her feel anxious. She never noticed this until after taking Flecainide. She is also off Bystolic. She thinks she has had 5 episodes in the last month. Pt denies any AF episodes, SOB, CP, LH, and dizziness, syncope. Denies any hospitalizations, ER visits, bleeding, or TIA/CVA symptoms. Overall feels well.    ROS:  General:  Denies fatigue, weight gain or loss  Cardiovascular:  Denies CP, PND, syncope, near syncope, edema + palpitations.  Pulmonary:  Denies GOODWIN, cough, or wheezing      Vitals:    10/29/20 1132   BP: 110/70   BP Location: Left arm   Patient Position: Sitting   Pulse: 82   SpO2: 99%   Weight: 57.6 kg (127 lb)   Height: 161.3 cm (63.5\")     Body mass index is 22.14 kg/m².  PE:  General: NAD. A & O x 3   Neck: no JVD, no carotid bruits, no TM  Heart RRR, NL S1, S2, S4 present, no rubs, murmurs  Lungs: CTA, no wheezes, rhonchi, or rales  Abd: soft, non-tender, NL BS  Ext: No musculoskeletal deformities, no edema, cyanosis, or clubbing  Psych: normal mood and affect    Diagnostic Data:        ECG 12 Lead    Date/Time: 10/29/2020 11:55 AM  Performed by: Eric Townsend MD  Authorized by: Eric Townsend MD   Comparison: compared with previous ECG from 8/24/2020  Similar to previous ECG  Rhythm: sinus rhythm  BPM: 77              1. PVCs (premature " ventricular contractions)    2. Paroxysmal atrial fibrillation (CMS/HCC)          Plan:  1. PVCs:  - better controlled on Flecainide. She is having other palpitations different from her PVCs and PAF, will obtain event monitor for further evaluation.     2. PAF:  - no recurrences clinically    F/up in 6 months    Electronically signed by KARL Pfeiffer, 10/29/20, 12:05 PM EDT.

## 2020-12-02 RX ORDER — PANTOPRAZOLE SODIUM 40 MG/1
TABLET, DELAYED RELEASE ORAL
Qty: 30 TABLET | Refills: 5 | Status: SHIPPED | OUTPATIENT
Start: 2020-12-02 | End: 2023-01-10

## 2020-12-04 ENCOUNTER — TELEPHONE (OUTPATIENT)
Dept: CARDIOLOGY | Facility: CLINIC | Age: 50
End: 2020-12-04

## 2020-12-04 NOTE — TELEPHONE ENCOUNTER
Attempted to call patient to review results of her monitor which was overall WNL. No atrial fibrillation, SVT, or other arrhythmias. Only rare PVCs/PACs. She did not answer and I left VM for her to call us back.

## 2021-01-13 ENCOUNTER — OFFICE VISIT (OUTPATIENT)
Dept: CARDIOLOGY | Facility: CLINIC | Age: 51
End: 2021-01-13

## 2021-01-13 VITALS
BODY MASS INDEX: 23.04 KG/M2 | DIASTOLIC BLOOD PRESSURE: 72 MMHG | OXYGEN SATURATION: 98 % | SYSTOLIC BLOOD PRESSURE: 102 MMHG | WEIGHT: 130 LBS | HEART RATE: 119 BPM | HEIGHT: 63 IN

## 2021-01-13 DIAGNOSIS — I48.0 PAROXYSMAL ATRIAL FIBRILLATION (HCC): Primary | ICD-10-CM

## 2021-01-13 DIAGNOSIS — I49.3 PVCS (PREMATURE VENTRICULAR CONTRACTIONS): ICD-10-CM

## 2021-01-13 PROCEDURE — 99213 OFFICE O/P EST LOW 20 MIN: CPT | Performed by: INTERNAL MEDICINE

## 2021-01-13 PROCEDURE — 93000 ELECTROCARDIOGRAM COMPLETE: CPT | Performed by: INTERNAL MEDICINE

## 2021-01-13 RX ORDER — MULTIPLE VITAMINS W/ MINERALS TAB 9MG-400MCG
1 TAB ORAL DAILY
COMMUNITY
End: 2021-10-20

## 2021-01-13 NOTE — PROGRESS NOTES
[facility] - Office Note  Betty Garg         [address] [place] [facility] [address]  1970   [phone] (home)        Location:  [facility] office.  Visit Type: Follow Up.    01/13/21     PCP:  Job Jackson MD    Identification:  Betty Garg is a 50 y.o.  female  currently employed.      Chief Complaint: FU on PACs/PVCs, PAF    Problem List:      1. Paroxysmal Atrial Fibrillation:   a. CHADSVasc = 0   b. 2 episodes of atrial fibrillation 8/2011 and 12/2012 with spontaneous conversion to normal sinus rhythm  c. Prn Rythmol  d. Normal echocardiogram 3/24/2016 with normal LVEF and no significant valvular heart disease  e. Normal GXT 7/28/2018 with heart rate reached at 189 bpm  f. Calcium artery score of 19.9 7/23/2018  g. 12/20/2020 ER 30 days No AF, < 1% PAC/PVC  2. Paroxysmal SVT/PVCs  a. 24 Hour Holter Monitor 7/31/19: NSR  bpm, average 80 bpm. Occasional PVCs. AT   3. Chest Pain Syndrome  a. Mild CAD calcification on CAC scan   b. Reported normal stress test 2018- data deficit  4. GERD  5. Anxiety      Allergies   Allergen Reactions   • Penicillins Hives         Current Outpatient Medications:   •  aspirin 81 MG tablet, Take 81 mg by mouth daily. oral delayed release tablet, Disp: , Rfl:   •  cholecalciferol (VITAMIN D3) 1000 UNITS tablet, Take 5,000 Units by mouth Daily., Disp: , Rfl:   •  coenzyme Q10 100 MG capsule, Take 100 mg by mouth Daily., Disp: , Rfl:   •  flecainide (TAMBOCOR) 100 MG tablet, Take 1 tablet by mouth 2 (Two) Times a Day. (Patient taking differently: Take 50 mg by mouth 2 (Two) Times a Day.), Disp: 60 tablet, Rfl: 5  •  Iron Combinations (IRON COMPLEX PO), Take  by mouth Daily., Disp: , Rfl:   •  multivitamin with minerals tablet tablet, Take 1 tablet by mouth Daily., Disp: , Rfl:   •  pantoprazole (PROTONIX) 40 MG EC tablet, TAKE ONE TABLET BY MOUTH DAILY, Disp: 30 tablet, Rfl: 5  •  Potassium 99 MG tablet, Take 99 mg by  "mouth Daily., Disp: , Rfl:   •  Probiotic Product (PROBIOTIC DAILY PO), Take 1 tablet by mouth Daily., Disp: , Rfl:   •  rosuvastatin (CRESTOR) 20 MG tablet, Take 20 mg by mouth Daily., Disp: , Rfl:   •  vitamin C (ASCORBIC ACID) 500 MG tablet, Take 500 mg by mouth Daily., Disp: , Rfl:   •  Wheat Dextrin (BENEFIBER DRINK MIX PO), Take 1 bottle by mouth Daily., Disp: , Rfl:   •  Zinc 50 MG capsule, Take 50 mg by mouth Daily., Disp: , Rfl:     HPI    The patient overall has done reasonably well.  She did have a period of time where she was having more palpitations most likely consistent with PVCs.  A 30-day event recorder did demonstrate rare PACs and PVCs with no episodes of A. fib.  She also notes some episodes of tachycardia that wakes her at nighttime but generally are rare and self-limiting.  Otherwise she denies any new cardiovascular complaints.  No chest pain or shortness of breath.  No lightheadedness or near syncope.  She does continue to have some issues with reflux that may be triggering her PVCs.  Blood pressures have been stable at home.  No hospitalizations or ER visits since we last seen her.  The PVCs are relatively infrequent and that they can happen at 1 day and not again for multiple days.        The following portions of the patient's history were reviewed in the chart and updated as appropriate: allergies, current medications, past family history, past medical history, past social history, past surgical history and problem list.    ROS          height is 160 cm (63\") and weight is 59 kg (130 lb). Her blood pressure is 102/72 and her pulse is 119. Her oxygen saturation is 98%.   Vitals signs reviewed.   Constitutional:       Appearance: Normal appearance. Well-developed.   Pulmonary:      Effort: Pulmonary effort is normal.      Breath sounds: Normal breath sounds. No wheezing. No rhonchi. No rales.   Cardiovascular:      Normal rate. Regular rhythm.   Pulses:     Intact distal pulses. "   Abdominal:      General: Bowel sounds are normal.      Palpations: Abdomen is soft.   Neurological:      Mental Status: Alert.             ECG 12 Lead    Date/Time: 1/13/2021 11:45 AM  Performed by: Eric Townsend MD  Authorized by: Eric Townsend MD   Comparison: compared with previous ECG from 10/29/2020  Similar to previous ECG  Rhythm: sinus rhythm  BPM: 80               Assessment/ Plan   Diagnoses and all orders for this visit:    1. Paroxysmal atrial fibrillation (CMS/HCC) (Primary)    2. PVCs (premature ventricular contractions)    Other orders  -     ECG 12 Lead      Overall doing well.  No evidence of recurrent atrial fibrillation by event recorder.  In regards to her PVCs, will add extra dose of 50 mg flecainide on the prn basis; 12 lead EKG NL today.    Eric Townsend MD-C  1/13/2021 11:46 EST

## 2021-03-02 ENCOUNTER — OFFICE (OUTPATIENT)
Dept: URBAN - METROPOLITAN AREA CLINIC 75 | Facility: CLINIC | Age: 51
End: 2021-03-02

## 2021-03-02 VITALS — HEART RATE: 79 BPM | HEIGHT: 64 IN | TEMPERATURE: 97.2 F | OXYGEN SATURATION: 99 % | WEIGHT: 131 LBS

## 2021-03-02 DIAGNOSIS — K21.9 GASTRO-ESOPHAGEAL REFLUX DISEASE WITHOUT ESOPHAGITIS: ICD-10-CM

## 2021-03-02 DIAGNOSIS — I49.3 VENTRICULAR PREMATURE DEPOLARIZATION: ICD-10-CM

## 2021-03-02 PROCEDURE — 99214 OFFICE O/P EST MOD 30 MIN: CPT

## 2021-03-02 RX ORDER — PANTOPRAZOLE SODIUM 40 MG/1
40 TABLET, DELAYED RELEASE ORAL
Qty: 30 | Refills: 0 | Status: COMPLETED
End: 2021-03-02

## 2021-03-02 RX ORDER — LANSOPRAZOLE 30 MG/1
30 CAPSULE, DELAYED RELEASE PELLETS ORAL
Qty: 30 | Refills: 11 | Status: COMPLETED
Start: 2021-03-02 | End: 2021-03-05

## 2021-05-04 VITALS
SYSTOLIC BLOOD PRESSURE: 118 MMHG | TEMPERATURE: 97.7 F | WEIGHT: 134 LBS | HEIGHT: 64 IN | DIASTOLIC BLOOD PRESSURE: 80 MMHG

## 2021-05-05 ENCOUNTER — OFFICE (OUTPATIENT)
Dept: URBAN - METROPOLITAN AREA CLINIC 75 | Facility: CLINIC | Age: 51
End: 2021-05-05

## 2021-05-05 DIAGNOSIS — K59.00 CONSTIPATION, UNSPECIFIED: ICD-10-CM

## 2021-05-05 DIAGNOSIS — K21.9 GASTRO-ESOPHAGEAL REFLUX DISEASE WITHOUT ESOPHAGITIS: ICD-10-CM

## 2021-05-05 PROCEDURE — 99214 OFFICE O/P EST MOD 30 MIN: CPT | Performed by: INTERNAL MEDICINE

## 2021-06-16 ENCOUNTER — OFFICE VISIT (OUTPATIENT)
Dept: FAMILY MEDICINE CLINIC | Facility: CLINIC | Age: 51
End: 2021-06-16

## 2021-06-16 VITALS
HEIGHT: 63 IN | WEIGHT: 132 LBS | RESPIRATION RATE: 16 BRPM | DIASTOLIC BLOOD PRESSURE: 74 MMHG | HEART RATE: 89 BPM | BODY MASS INDEX: 23.39 KG/M2 | SYSTOLIC BLOOD PRESSURE: 118 MMHG | OXYGEN SATURATION: 98 %

## 2021-06-16 DIAGNOSIS — L29.9 PRURITUS: Primary | ICD-10-CM

## 2021-06-16 PROBLEM — K57.32 DIVERTICULITIS LARGE INTESTINE W/O PERFORATION OR ABSCESS W/O BLEEDING: Status: ACTIVE | Noted: 2018-03-14

## 2021-06-16 LAB
ALBUMIN SERPL-MCNC: 4.9 G/DL (ref 3.5–5.2)
ALBUMIN/GLOB SERPL: 2.6 G/DL
ALP SERPL-CCNC: 54 U/L (ref 39–117)
ALT SERPL-CCNC: 16 U/L (ref 1–33)
AST SERPL-CCNC: 19 U/L (ref 1–32)
BILIRUB SERPL-MCNC: 0.4 MG/DL (ref 0–1.2)
BUN SERPL-MCNC: 12 MG/DL (ref 6–20)
BUN/CREAT SERPL: 17.6 (ref 7–25)
CALCIUM SERPL-MCNC: 9.8 MG/DL (ref 8.6–10.5)
CHLORIDE SERPL-SCNC: 102 MMOL/L (ref 98–107)
CO2 SERPL-SCNC: 26.5 MMOL/L (ref 22–29)
CREAT SERPL-MCNC: 0.68 MG/DL (ref 0.57–1)
GLOBULIN SER CALC-MCNC: 1.9 GM/DL
GLUCOSE SERPL-MCNC: 98 MG/DL (ref 65–99)
POTASSIUM SERPL-SCNC: 4.4 MMOL/L (ref 3.5–5.2)
PROT SERPL-MCNC: 6.8 G/DL (ref 6–8.5)
SODIUM SERPL-SCNC: 140 MMOL/L (ref 136–145)

## 2021-06-16 PROCEDURE — 99213 OFFICE O/P EST LOW 20 MIN: CPT | Performed by: NURSE PRACTITIONER

## 2021-06-16 RX ORDER — METHYLPREDNISOLONE 4 MG/1
TABLET ORAL
Qty: 1 EACH | Refills: 0 | Status: SHIPPED | OUTPATIENT
Start: 2021-06-16 | End: 2021-10-20

## 2021-06-16 RX ORDER — HYDROXYZINE HYDROCHLORIDE 25 MG/1
25 TABLET, FILM COATED ORAL EVERY 8 HOURS PRN
Qty: 30 TABLET | Refills: 1 | Status: SHIPPED | OUTPATIENT
Start: 2021-06-16 | End: 2021-12-09

## 2021-06-16 NOTE — PROGRESS NOTES
Subjective   Betty Garg is a 50 y.o. female.     History of Present Illness   Patient presents for c/o itching all over her body for a week. She denies any rash. She reports that she has not started any new medications or used any detergents.  Patient has taken benadryl at night and has used lotion to try and help with itching. Patient reports that her symptoms are intermittent.     The following portions of the patient's history were reviewed and updated as appropriate: allergies, current medications, past family history, past medical history, past social history, past surgical history and problem list.    Review of Systems   Constitutional: Negative for chills, fatigue and fever.   Respiratory: Negative for cough, chest tightness, shortness of breath and wheezing.    Cardiovascular: Positive for palpitations. Negative for chest pain and leg swelling.   Gastrointestinal: Negative for constipation, diarrhea, nausea and vomiting.   Musculoskeletal: Negative for arthralgias and joint swelling.   Skin: Negative for color change, dry skin, pallor, rash, skin lesions and bruise.        Itching   Allergic/Immunologic: Negative.    Neurological: Negative for dizziness, weakness and headache.       Objective   Physical Exam  Vitals and nursing note reviewed.   Constitutional:       Appearance: She is well-developed.   HENT:      Head: Normocephalic and atraumatic.   Eyes:      Conjunctiva/sclera: Conjunctivae normal.      Pupils: Pupils are equal, round, and reactive to light.   Cardiovascular:      Rate and Rhythm: Normal rate and regular rhythm.      Heart sounds: Normal heart sounds. No murmur heard.     Pulmonary:      Effort: Pulmonary effort is normal.      Breath sounds: Normal breath sounds.   Musculoskeletal:         General: No deformity.      Cervical back: Normal range of motion and neck supple.   Lymphadenopathy:      Cervical: No cervical adenopathy.   Skin:     General: Skin is warm and dry.       Findings: Erythema and rash present. No lesion.      Comments: Erythematous fine rash to back, chest, abdomen and groin.    Neurological:      Mental Status: She is alert and oriented to person, place, and time.   Psychiatric:         Behavior: Behavior normal.         Thought Content: Thought content normal.         Judgment: Judgment normal.         Vitals:    06/16/21 1251   BP: 118/74   Pulse: 89   Resp: 16   SpO2: 98%     Body mass index is 23.39 kg/m².    Procedures    Assessment/Plan   Problems Addressed this Visit     None      Visit Diagnoses     Pruritus    -  Primary    Relevant Medications    methylPREDNISolone (MEDROL) 4 MG dose pack    hydrOXYzine (ATARAX) 25 MG tablet    Other Relevant Orders    Comprehensive Metabolic Panel    CBC & Differential      Diagnoses       Codes Comments    Pruritus    -  Primary ICD-10-CM: L29.9  ICD-9-CM: 698.9              Return if symptoms worsen or fail to improve.

## 2021-06-17 LAB
BASOPHILS # BLD AUTO: 0.03 10*3/MM3 (ref 0–0.2)
BASOPHILS NFR BLD AUTO: 0.4 % (ref 0–1.5)
EOSINOPHIL # BLD AUTO: 0.06 10*3/MM3 (ref 0–0.4)
EOSINOPHIL NFR BLD AUTO: 0.8 % (ref 0.3–6.2)
ERYTHROCYTE [DISTWIDTH] IN BLOOD BY AUTOMATED COUNT: 11.8 % (ref 12.3–15.4)
HCT VFR BLD AUTO: 39.7 % (ref 34–46.6)
HGB BLD-MCNC: 13.9 G/DL (ref 12–15.9)
IMM GRANULOCYTES # BLD AUTO: 0.02 10*3/MM3 (ref 0–0.05)
IMM GRANULOCYTES NFR BLD AUTO: 0.3 % (ref 0–0.5)
LYMPHOCYTES # BLD AUTO: 2.27 10*3/MM3 (ref 0.7–3.1)
LYMPHOCYTES NFR BLD AUTO: 30.4 % (ref 19.6–45.3)
MCH RBC QN AUTO: 33 PG (ref 26.6–33)
MCHC RBC AUTO-ENTMCNC: 35 G/DL (ref 31.5–35.7)
MCV RBC AUTO: 94.3 FL (ref 79–97)
MONOCYTES # BLD AUTO: 0.55 10*3/MM3 (ref 0.1–0.9)
MONOCYTES NFR BLD AUTO: 7.4 % (ref 5–12)
NEUTROPHILS # BLD AUTO: 4.53 10*3/MM3 (ref 1.7–7)
NEUTROPHILS NFR BLD AUTO: 60.7 % (ref 42.7–76)
NRBC BLD AUTO-RTO: 0 /100 WBC (ref 0–0.2)
PLATELET # BLD AUTO: 282 10*3/MM3 (ref 140–450)
RBC # BLD AUTO: 4.21 10*6/MM3 (ref 3.77–5.28)
WBC # BLD AUTO: 7.46 10*3/MM3 (ref 3.4–10.8)

## 2021-06-18 ENCOUNTER — TELEPHONE (OUTPATIENT)
Dept: FAMILY MEDICINE CLINIC | Facility: CLINIC | Age: 51
End: 2021-06-18

## 2021-06-18 RX ORDER — PERMETHRIN 50 MG/G
CREAM TOPICAL ONCE
Qty: 60 G | Refills: 0 | Status: SHIPPED | OUTPATIENT
Start: 2021-06-18 | End: 2021-06-18

## 2021-06-18 NOTE — TELEPHONE ENCOUNTER
PATIENT STATES RASH IS GETTING WORSE AND SPREADING. PATIENT CALLED DERMATOLOGIST BUT WAS UNABLE TO MAKE AND APPOINTMENT. IN TALKING WITH THE NURSE AT THE DERMATOLOGIST OFFICE, THEY CAME TO THE CONCLUSION THAT THE RASH COULD BE CAUSED BY SCABIES.  PATIENT REQUESTED ORDERS BE CALLED IN FOR MEDICATION TO TREAT SCABIES.    [facility] [address] AT [facility] & [facility] - [phone] [facility] [phone]   [phone]    PLEASE ADVISE: [phone]

## 2021-07-14 RX ORDER — FLECAINIDE ACETATE 100 MG/1
100 TABLET ORAL 2 TIMES DAILY
Qty: 60 TABLET | Refills: 5 | Status: SHIPPED | OUTPATIENT
Start: 2021-07-14 | End: 2022-01-05

## 2021-09-27 ENCOUNTER — TELEPHONE (OUTPATIENT)
Dept: CARDIOLOGY | Facility: CLINIC | Age: 51
End: 2021-09-27

## 2021-09-27 NOTE — TELEPHONE ENCOUNTER
Patient called and left a message. I tried to call her back. No answer. Her mailbox was full so I did not get the option to leave a message.

## 2021-10-20 ENCOUNTER — OFFICE VISIT (OUTPATIENT)
Dept: CARDIOLOGY | Facility: CLINIC | Age: 51
End: 2021-10-20

## 2021-10-20 VITALS
WEIGHT: 136 LBS | OXYGEN SATURATION: 100 % | SYSTOLIC BLOOD PRESSURE: 126 MMHG | HEART RATE: 85 BPM | HEIGHT: 64 IN | BODY MASS INDEX: 23.22 KG/M2 | DIASTOLIC BLOOD PRESSURE: 72 MMHG

## 2021-10-20 DIAGNOSIS — I49.3 PVCS (PREMATURE VENTRICULAR CONTRACTIONS): ICD-10-CM

## 2021-10-20 DIAGNOSIS — I48.0 PAROXYSMAL ATRIAL FIBRILLATION (HCC): Primary | ICD-10-CM

## 2021-10-20 PROCEDURE — 99213 OFFICE O/P EST LOW 20 MIN: CPT | Performed by: INTERNAL MEDICINE

## 2021-10-20 PROCEDURE — 93000 ELECTROCARDIOGRAM COMPLETE: CPT | Performed by: INTERNAL MEDICINE

## 2021-10-20 RX ORDER — MULTIPLE VITAMINS W/ MINERALS TAB 9MG-400MCG
1 TAB ORAL DAILY
COMMUNITY

## 2021-10-20 NOTE — PROGRESS NOTES
Betty Garg  1970  [phone]    10/20/2021    [facility] CARDIOLOGY     Referring Provider: No ref. provider found     Job Jackson MD  [address]  [facility] [address]    Cc: AF/PVC/CP    Problem List:      1. Paroxysmal Atrial Fibrillation:   a. CHADSVasc = 0   b. 2 episodes of atrial fibrillation 8/2011 and 12/2012 with spontaneous conversion to normal sinus rhythm  c. Prn Rythmol  d. Normal echocardiogram 3/24/2016 with normal LVEF and no significant valvular heart disease  e. Normal GXT 7/28/2018 with heart rate reached at 189 bpm  f. Calcium artery score of 19.9 7/23/2018  g. 12/20/2020 ER 30 days No AF, < 1% PAC/PVC  2. Paroxysmal SVT/PVCs  a. 24 Hour Holter Monitor 7/31/19: NSR  bpm, average 80 bpm. Occasional PVCs. AT   3. Chest Pain Syndrome  a. Mild CAD calcification on CAC scan   b. Reported normal stress test 2018- data deficit  4. GERD  5. Anxiety      Allergies  Allergies   Allergen Reactions   • Penicillins Hives       Current Medications    Current Outpatient Medications:   •  aspirin 81 MG tablet, Take 81 mg by mouth daily. oral delayed release tablet, Disp: , Rfl:   •  coenzyme Q10 100 MG capsule, Take 100 mg by mouth Daily., Disp: , Rfl:   •  flecainide (TAMBOCOR) 100 MG tablet, Take 1 tablet by mouth 2 (Two) Times a Day. Please make follow up appointment. (Patient taking differently: Take 100 mg by mouth 2 (Two) Times a Day. PT taking 50mg three times daily), Disp: 60 tablet, Rfl: 5  •  hydrOXYzine (ATARAX) 25 MG tablet, Take 1 tablet by mouth Every 8 (Eight) Hours As Needed for Itching., Disp: 30 tablet, Rfl: 1  •  multivitamin with minerals (Hair Skin and Nails Formula) tablet tablet, Take 1 tablet by mouth Daily., Disp: , Rfl:   •  pantoprazole (PROTONIX) 40 MG EC tablet, TAKE ONE TABLET BY MOUTH DAILY, Disp: 30 tablet, Rfl: 5  •  Probiotic Product (PROBIOTIC DAILY PO), Take 1 tablet by mouth Daily., Disp: , Rfl:   •  rosuvastatin (CRESTOR)  "20 MG tablet, Take 20 mg by mouth Daily., Disp: , Rfl:   •  vitamin C (ASCORBIC ACID) 500 MG tablet, Take 500 mg by mouth Daily., Disp: , Rfl:   •  Wheat Dextrin (BENEFIBER DRINK MIX PO), Take 1 bottle by mouth Daily., Disp: , Rfl:   •  Zinc 50 MG capsule, Take 50 mg by mouth Daily., Disp: , Rfl:     History of Present Illness     Pt presents for follow up of AF/PVC/CP. Since we last saw the pt, pt denies any AF episodes, SOB, CP, LH, and dizziness. Denies any hospitalizations, ER visits, bleeding, or TIA/CVA symptoms. Overall feels well. PVC's improved. BP stable    ROS:  General:  Denies fatigue, weight gain or loss  Cardiovascular:  Denies CP, PND, syncope, near syncope, edema or palpitations.  Pulmonary:  Denies GOODWIN, cough, or wheezing      Vitals:    10/20/21 1133   BP: 126/72   BP Location: Left arm   Patient Position: Sitting   Pulse: 85   SpO2: 100%   Weight: 61.7 kg (136 lb)   Height: 162.6 cm (64\")     Body mass index is 23.34 kg/m².  PE:  General: NAD  Neck: no JVD, no carotid bruits, no TM  Heart RRR, NL S1, S2, S4 present, no rubs, murmurs  Lungs: CTA, no wheezes, rhonchi, or rales  Abd: soft, non-tender, NL BS  Ext: No musculoskeletal deformities, no edema, cyanosis, or clubbing  Psych: normal mood and affect    Diagnostic Data:        ECG 12 Lead    Date/Time: 10/20/2021 11:43 AM  Performed by: Eric Townsend MD  Authorized by: Eric Townsend MD   Comparison: compared with previous ECG from 1/31/2021  Similar to previous ECG  Rhythm: sinus rhythm  BPM: 84              1. Paroxysmal atrial fibrillation (HCC)    2. PVCs (premature ventricular contractions)          Plan:  1) PAF: no recurrence on flecainide. QRS stable  Continue present medications.   2) PVC: resolved on meds      F/up in 12 months      "

## 2021-10-22 ENCOUNTER — FLU SHOT (OUTPATIENT)
Dept: FAMILY MEDICINE CLINIC | Facility: CLINIC | Age: 51
End: 2021-10-22

## 2021-10-22 DIAGNOSIS — Z23 NEED FOR VACCINATION: Primary | ICD-10-CM

## 2021-10-22 PROCEDURE — 90471 IMMUNIZATION ADMIN: CPT | Performed by: FAMILY MEDICINE

## 2021-10-22 PROCEDURE — 90686 IIV4 VACC NO PRSV 0.5 ML IM: CPT | Performed by: FAMILY MEDICINE

## 2021-12-09 ENCOUNTER — TELEMEDICINE (OUTPATIENT)
Dept: FAMILY MEDICINE CLINIC | Facility: CLINIC | Age: 51
End: 2021-12-09

## 2021-12-09 DIAGNOSIS — U07.1 COVID-19 VIRUS INFECTION: Primary | ICD-10-CM

## 2021-12-09 DIAGNOSIS — I48.0 PAROXYSMAL ATRIAL FIBRILLATION (HCC): ICD-10-CM

## 2021-12-09 PROCEDURE — 99214 OFFICE O/P EST MOD 30 MIN: CPT | Performed by: FAMILY MEDICINE

## 2021-12-09 NOTE — PROGRESS NOTES
Subjective   Betty Garg is a 51 y.o. female.   Covid symptoms    History of Present Illness   Betty is on this MyChart video visit because she has been diagnosed w/ Covid.   She went to [facility] on Saturday , 5 days ago, and was diagnosed w/ strep throat initially and placed on an antibiotic, Clinda.  Her son became ill the following day and was taken to [facility] and he tested positive for Covid.  She was retested and was then positive for Covid.  She has a sore throat , is congested and fatigued.  She and her  have been vaccinated.  Complicating matters is the fact that she has paroxysmal atrial fibrillation and is on treatment for this cardiac rhythm disturbance.  Because of that she is at a higher risk for complications due to Covid.  She states she is feeling fairly ill but is not short of breath and has not had a fever.  The following portions of the patient's history were reviewed and updated as appropriate: allergies, current medications, past family history, past medical history, past social history, past surgical history and problem list.    Review of Systems   Constitutional: Positive for activity change, appetite change and fatigue. Negative for fever.   HENT: Positive for sore throat.    Respiratory: Negative for chest tightness and shortness of breath.    Cardiovascular: Negative for chest pain.   Neurological: Negative.    Psychiatric/Behavioral: Negative.        Objective   Physical Exam  Constitutional:       Appearance: She is ill-appearing.   Pulmonary:      Effort: Pulmonary effort is normal.   Neurological:      General: No focal deficit present.      Mental Status: She is alert.           Assessment/Plan   Problem List Items Addressed This Visit        Cardiac and Vasculature    Paroxysmal atrial fibrillation (HCC)    Overview     Overview:   CHADS =0           Other Visit Diagnoses     COVID-19 virus infection    -  Primary        I have reviewed options for her and  advised monoclonal antibody treatment.  She has agreed to this plan.  The protocol and risks and benefits of multiple antibodies were reviewed with Malaika and the order has been placed.  I have advised her to go to the emergency room should she get worse before we get this treatment taking care for her.       No follow-ups on file.

## 2021-12-10 ENCOUNTER — TRANSCRIBE ORDERS (OUTPATIENT)
Dept: ADMINISTRATIVE | Facility: HOSPITAL | Age: 51
End: 2021-12-10

## 2021-12-10 DIAGNOSIS — U07.1 CLINICAL DIAGNOSIS OF SEVERE ACUTE RESPIRATORY SYNDROME CORONAVIRUS 2 (SARS-COV-2) DISEASE: Primary | ICD-10-CM

## 2021-12-13 ENCOUNTER — HOSPITAL ENCOUNTER (OUTPATIENT)
Dept: INFUSION THERAPY | Facility: HOSPITAL | Age: 51
Discharge: HOME | End: 2021-12-13
Admitting: FAMILY MEDICINE

## 2021-12-13 VITALS
RESPIRATION RATE: 20 BRPM | BODY MASS INDEX: 23.04 KG/M2 | HEART RATE: 76 BPM | SYSTOLIC BLOOD PRESSURE: 121 MMHG | OXYGEN SATURATION: 100 % | HEIGHT: 63 IN | WEIGHT: 130 LBS | DIASTOLIC BLOOD PRESSURE: 77 MMHG | TEMPERATURE: 97.3 F

## 2021-12-13 DIAGNOSIS — U07.1 COVID-19: Primary | ICD-10-CM

## 2021-12-13 PROCEDURE — 25010000002 INJECTION, BAMLANIVIMAB AND ETESEVIMAB, 2100 MG: Performed by: FAMILY MEDICINE

## 2021-12-13 PROCEDURE — M0245 HC IV INFUSION, BAMLANIVIMAB AND ETESEVIMAB, 2100 MG: HCPCS | Performed by: FAMILY MEDICINE

## 2021-12-13 PROCEDURE — 96365 THER/PROPH/DIAG IV INF INIT: CPT

## 2021-12-13 RX ORDER — METHYLPREDNISOLONE SODIUM SUCCINATE 125 MG/2ML
125 INJECTION, POWDER, LYOPHILIZED, FOR SOLUTION INTRAMUSCULAR; INTRAVENOUS AS NEEDED
OUTPATIENT
Start: 2021-12-13

## 2021-12-13 RX ORDER — CLINDAMYCIN HYDROCHLORIDE 150 MG/1
150 CAPSULE ORAL 3 TIMES DAILY
COMMUNITY
End: 2022-01-05

## 2021-12-13 RX ORDER — DIPHENHYDRAMINE HYDROCHLORIDE 50 MG/ML
50 INJECTION INTRAMUSCULAR; INTRAVENOUS ONCE AS NEEDED
Status: DISCONTINUED | OUTPATIENT
Start: 2021-12-13 | End: 2021-12-16 | Stop reason: HOSPADM

## 2021-12-13 RX ORDER — FLECAINIDE ACETATE 50 MG/1
50 TABLET ORAL 3 TIMES DAILY
COMMUNITY
End: 2022-04-05 | Stop reason: SDUPTHER

## 2021-12-13 RX ORDER — METHYLPREDNISOLONE SODIUM SUCCINATE 125 MG/2ML
125 INJECTION, POWDER, LYOPHILIZED, FOR SOLUTION INTRAMUSCULAR; INTRAVENOUS AS NEEDED
Status: DISCONTINUED | OUTPATIENT
Start: 2021-12-13 | End: 2021-12-16 | Stop reason: HOSPADM

## 2021-12-13 RX ORDER — DIPHENHYDRAMINE HCL 25 MG
50 CAPSULE ORAL ONCE AS NEEDED
OUTPATIENT
Start: 2021-12-13

## 2021-12-13 RX ORDER — DIPHENHYDRAMINE HYDROCHLORIDE 50 MG/ML
50 INJECTION INTRAMUSCULAR; INTRAVENOUS ONCE AS NEEDED
OUTPATIENT
Start: 2021-12-13

## 2021-12-13 RX ORDER — SODIUM CHLORIDE 9 MG/ML
30 INJECTION, SOLUTION INTRAVENOUS ONCE
Status: COMPLETED | OUTPATIENT
Start: 2021-12-13 | End: 2021-12-13

## 2021-12-13 RX ORDER — DIPHENHYDRAMINE HCL 25 MG
50 CAPSULE ORAL ONCE AS NEEDED
Status: DISCONTINUED | OUTPATIENT
Start: 2021-12-13 | End: 2021-12-16 | Stop reason: HOSPADM

## 2021-12-13 RX ORDER — SODIUM CHLORIDE 9 MG/ML
30 INJECTION, SOLUTION INTRAVENOUS ONCE
Status: CANCELLED | OUTPATIENT
Start: 2021-12-13

## 2021-12-13 RX ADMIN — SODIUM CHLORIDE 30 ML/HR: 9 INJECTION, SOLUTION INTRAVENOUS at 11:58

## 2021-12-13 RX ADMIN — SODIUM CHLORIDE: 9 INJECTION, SOLUTION INTRAVENOUS at 11:57

## 2021-12-13 NOTE — PROGRESS NOTES
Monoclonal orders:    Upon reviewing orders, the required order sheet did not have the date of symptom onset or date of positive test listed. I then reviewed the patient chart and per Dr. Jackson chart note from 12/9, it clearly describes date of onset in this case it was 12/4/21 and date of positive COVID-19 test was 12/5/21. Orders were then placed since date of symptom onset was within 10 days of today.     Thank you,  Garry Kaiser, PharmD

## 2021-12-29 DIAGNOSIS — E78.2 MIXED HYPERLIPIDEMIA: Primary | ICD-10-CM

## 2021-12-29 DIAGNOSIS — Z79.899 HIGH RISK MEDICATION USE: ICD-10-CM

## 2022-01-05 ENCOUNTER — OFFICE VISIT (OUTPATIENT)
Dept: FAMILY MEDICINE CLINIC | Facility: CLINIC | Age: 52
End: 2022-01-05

## 2022-01-05 VITALS
HEART RATE: 103 BPM | RESPIRATION RATE: 16 BRPM | BODY MASS INDEX: 23.57 KG/M2 | DIASTOLIC BLOOD PRESSURE: 74 MMHG | SYSTOLIC BLOOD PRESSURE: 124 MMHG | WEIGHT: 133 LBS | OXYGEN SATURATION: 99 % | HEIGHT: 63 IN

## 2022-01-05 DIAGNOSIS — I48.0 PAROXYSMAL ATRIAL FIBRILLATION: ICD-10-CM

## 2022-01-05 DIAGNOSIS — M25.552 HIP PAIN, CHRONIC, LEFT: ICD-10-CM

## 2022-01-05 DIAGNOSIS — E78.2 MIXED HYPERLIPIDEMIA: ICD-10-CM

## 2022-01-05 DIAGNOSIS — Z00.00 ROUTINE GENERAL MEDICAL EXAMINATION AT A HEALTH CARE FACILITY: Primary | ICD-10-CM

## 2022-01-05 DIAGNOSIS — G89.29 HIP PAIN, CHRONIC, LEFT: ICD-10-CM

## 2022-01-05 PROBLEM — I25.10 CALCIFIC CORONARY ARTERIOSCLEROSIS: Status: ACTIVE | Noted: 2021-10-19

## 2022-01-05 LAB
ALBUMIN SERPL-MCNC: 4.7 G/DL (ref 3.8–4.8)
ALBUMIN/GLOB SERPL: 2.1 {RATIO} (ref 1.2–2.2)
ALP SERPL-CCNC: 62 IU/L (ref 44–121)
ALT SERPL-CCNC: 17 IU/L (ref 0–32)
AST SERPL-CCNC: 17 IU/L (ref 0–40)
BILIRUB SERPL-MCNC: 0.5 MG/DL (ref 0–1.2)
BUN SERPL-MCNC: 12 MG/DL (ref 6–24)
BUN/CREAT SERPL: 16 (ref 9–23)
CALCIUM SERPL-MCNC: 9.8 MG/DL (ref 8.7–10.2)
CHLORIDE SERPL-SCNC: 103 MMOL/L (ref 96–106)
CHOLEST SERPL-MCNC: 137 MG/DL (ref 100–199)
CO2 SERPL-SCNC: 25 MMOL/L (ref 20–29)
CREAT SERPL-MCNC: 0.75 MG/DL (ref 0.57–1)
ERYTHROCYTE [DISTWIDTH] IN BLOOD BY AUTOMATED COUNT: 11.8 % (ref 11.7–15.4)
GLOBULIN SER CALC-MCNC: 2.2 G/DL (ref 1.5–4.5)
GLUCOSE SERPL-MCNC: 91 MG/DL (ref 65–99)
HCT VFR BLD AUTO: 40.9 % (ref 34–46.6)
HDLC SERPL-MCNC: 63 MG/DL
HGB BLD-MCNC: 14.1 G/DL (ref 11.1–15.9)
LDLC SERPL CALC-MCNC: 59 MG/DL (ref 0–99)
LDLC/HDLC SERPL: 0.9 RATIO (ref 0–3.2)
MCH RBC QN AUTO: 32.9 PG (ref 26.6–33)
MCHC RBC AUTO-ENTMCNC: 34.5 G/DL (ref 31.5–35.7)
MCV RBC AUTO: 96 FL (ref 79–97)
PLATELET # BLD AUTO: 268 X10E3/UL (ref 150–450)
POTASSIUM SERPL-SCNC: 4.5 MMOL/L (ref 3.5–5.2)
PROT SERPL-MCNC: 6.9 G/DL (ref 6–8.5)
RBC # BLD AUTO: 4.28 X10E6/UL (ref 3.77–5.28)
SODIUM SERPL-SCNC: 141 MMOL/L (ref 134–144)
TRIGL SERPL-MCNC: 76 MG/DL (ref 0–149)
VLDLC SERPL CALC-MCNC: 15 MG/DL (ref 5–40)
WBC # BLD AUTO: 6.1 X10E3/UL (ref 3.4–10.8)

## 2022-01-05 PROCEDURE — 99396 PREV VISIT EST AGE 40-64: CPT | Performed by: FAMILY MEDICINE

## 2022-01-05 RX ORDER — MELATONIN
5000 DAILY
COMMUNITY

## 2022-01-05 NOTE — PATIENT INSTRUCTIONS
Annual Wellness  Personal Prevention Plan of Service     Date of Office Visit:  2022  Encounter Provider:  Job Jackson MD  Place of Service:  [facility]  Patient Name: Betty Garg  :  1970    As part of the Annual Wellness portion of your visit today, we are providing you with this personalized preventive plan of services (PPPS). This plan is based upon recommendations of the [place] Preventive Services Task Force (USPSTF) and the Advisory Committee on Immunization Practices (ACIP).    This lists the preventive care services that should be considered, and provides dates of when you are due. Items listed as completed are up-to-date and do not require any further intervention.    Health Maintenance   Topic Date Due   • ZOSTER VACCINE (1 of 2) Never done   • COVID-19 Vaccine (3 - Booster for Moderna series) 10/15/2021   • LIPID PANEL  2023   • ANNUAL PHYSICAL  2023   • PAP SMEAR  2023   • MAMMOGRAM  2023   • TDAP/TD VACCINES (3 - Td or Tdap) 2028   • COLORECTAL CANCER SCREENING  2029   • HEPATITIS C SCREENING  Completed   • INFLUENZA VACCINE  Completed   • Pneumococcal Vaccine 0-64  Aged Out       Orders Placed This Encounter   Procedures   • Ambulatory Referral to Orthopedic Surgery     Referral Priority:   Routine     Referral Type:   Consultation     Referral Reason:   Specialty Services Required     Referred to Provider:   Basim Celestin MD     Requested Specialty:   Orthopedic Surgery     Number of Visits Requested:   1       Return in about 1 year (around 2023).

## 2022-01-05 NOTE — PROGRESS NOTES
"Preventive Exam    History of Present Illness: Betty is here for check up and review of routine health maintenance. She states she is doing well and we addressed the following concerns:    She is concerned about significant hip pain and feels that she is done just about everything she can think up to do to get this managed. She states the pain is constant and over-the-counter medication has not been effective. She has been to physical therapy and she feels that it really did not do much for her at all. She would like further evaluation to see if there is anything else that can help her not live with his daily pain.    She has a hx of palpitations and and paroxysmal afib and is followed by cardiology - she is on Bystolic, aspirin and Crestor.      She had COVID and got monoclonal antibody infusion 12/13/22    Pap Smear:UTD  Mammogram:UTD  Colon cancer screening:UTD  Tobacco use :non-smoker      REVIEW OF SYSTEMS  Constitutional: Negative.    HENT: Negative.    Eyes: Negative.    Respiratory: Negative.    Cardiovascular: Negative.    Gastrointestinal: Negative.    Endocrine: Negative.    Genitourinary: Negative.    Musculoskeletal: Negative.  Skin: Negative.    Allergic/Immunologic: Negative.    Neurological: Negative.    Hematological: Negative.    Psychiatric/Behavioral: Negative.    I have reviewed the ROS as documented by the MA. Job Jackson MD       PHYSICAL EXAM    Vitals:    01/05/22 1107   BP: 124/74   Pulse: 103   Resp: 16   SpO2: 99%   Weight: 60.3 kg (133 lb)   Height: 160 cm (63\")     GENERAL: alert and oriented, afebrile and vital signs stable  HEENT: oral mucosa moist, PEERLA, EOM, conjunctiva normal  No cervical adenopathy  LUNGS: clear to ascultation bilaterally, no rales, ronchi or wheezing  HEART: RRR S1 S2 without murmers, thrills, rubs or gallops  CHEST WALL: within normal limits, no tenderness  ABDOMEN: WNL. Normal BS.  EXTREMITIES: No clubbing, cyanosis or edema noted. Normal " Pulses.  SKIN: warm, dry, no rashes noted  NEURO: CN II- XII grossly intact  PSYCH: Good mood and positive affect  ASSESSMENT AND PLAN  Problem List Items Addressed This Visit        Cardiac and Vasculature    Paroxysmal atrial fibrillation (HCC)  Followed by cardiology and taking Tambocor 50 mg 3 times a day. She is on a daily aspirin. She feels that she is doing well on these medications.    Overview     Overview:   CHADS =0         Mixed hyperlipidemia  She is taking Crestor 20 mg and is doing well on this medication.      Other Visit Diagnoses     Routine general medical examination at a health care facility    -  Primary    Hip pain, chronic, left        Relevant Orders    Ambulatory Referral to Orthopedic Surgery        Routine health maintenance reviewed and discussed with Betty.  The patient was counseled regarding a healthy diet and exercise, appropriate routine screening and immunizations and encouraged to get regular dental and eye exams .    Return in about 1 year (around 1/5/2023).

## 2022-01-12 ENCOUNTER — OFFICE VISIT (OUTPATIENT)
Dept: ORTHOPEDIC SURGERY | Facility: CLINIC | Age: 52
End: 2022-01-12

## 2022-01-12 VITALS — BODY MASS INDEX: 23.39 KG/M2 | HEIGHT: 63 IN | WEIGHT: 132 LBS | TEMPERATURE: 97.5 F

## 2022-01-12 DIAGNOSIS — M53.3 SI (SACROILIAC) JOINT DYSFUNCTION: ICD-10-CM

## 2022-01-12 DIAGNOSIS — M54.50 LUMBAR SPINE PAIN: Primary | ICD-10-CM

## 2022-01-12 PROCEDURE — 72100 X-RAY EXAM L-S SPINE 2/3 VWS: CPT | Performed by: ORTHOPAEDIC SURGERY

## 2022-01-12 PROCEDURE — 99214 OFFICE O/P EST MOD 30 MIN: CPT | Performed by: ORTHOPAEDIC SURGERY

## 2022-01-12 PROCEDURE — 72170 X-RAY EXAM OF PELVIS: CPT | Performed by: ORTHOPAEDIC SURGERY

## 2022-01-12 NOTE — PROGRESS NOTES
"Patient Name: Betty Garg   YOB: 1970  Referring Primary Care Physician: Job Jackson MD  BMI: Body mass index is 23.38 kg/m².    Chief Complaint:    Chief Complaint   Patient presents with   • Left Hip - Pain, Follow-up        HPI:     Betty Garg is a 51 y.o. female who presents today for evaluation of   Chief Complaint   Patient presents with   • Left Hip - Pain, Follow-up   .  Patient is seen today complaining of \"hip pain.  She points to her left sacroiliac joint she had trouble with it a few years ago.  It does not really radiate anywhere she denies it radiates down her leg she does get some back pain that hurts her when she works out.  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      Subjective   Medications:   Home Medications:  Current Outpatient Medications on File Prior to Visit   Medication Sig   • aspirin 81 MG tablet Take 81 mg by mouth daily. oral delayed release tablet   • cholecalciferol (VITAMIN D3) 25 MCG (1000 UT) tablet Take 5,000 Units by mouth Daily.   • coenzyme Q10 100 MG capsule Take 100 mg by mouth Daily.   • flecainide (TAMBOCOR) 50 MG tablet Take 50 mg by mouth 3 (Three) Times a Day.   • multivitamin with minerals (Hair Skin and Nails Formula) tablet tablet Take 1 tablet by mouth Daily.   • pantoprazole (PROTONIX) 40 MG EC tablet TAKE ONE TABLET BY MOUTH DAILY   • Probiotic Product (PROBIOTIC DAILY PO) Take 1 tablet by mouth Daily.   • rosuvastatin (CRESTOR) 20 MG tablet Take 20 mg by mouth Daily.   • vitamin C (ASCORBIC ACID) 500 MG tablet Take 500 mg by mouth Daily.   • Wheat Dextrin (BENEFIBER DRINK MIX PO) Take 1 bottle by mouth Daily.   • Zinc 50 MG capsule Take 50 mg by mouth Daily.     No current facility-administered medications on file prior to visit.     Current Medications:  Scheduled Meds:  Continuous Infusions:No current facility-administered medications for this visit.    PRN " Meds:.    I have reviewed the patient's medical history in detail and updated the computerized patient record.  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    • Skin cancer    • Wears glasses         Past Surgical History:   Procedure Laterality Date   • COLON RESECTION     • COLONOSCOPY     • PAP SMEAR  01/2014        Social History     Occupational History   • Occupation: Sales   Tobacco Use   • Smoking status: Never Smoker   • Smokeless tobacco: Never Used   Substance and Sexual Activity   • Alcohol use: Yes     Comment: 2/week   • Drug use: No   • Sexual activity: Defer     Partners: Male      Social History     Social History Narrative    Lives at home with  and children        Family History   Problem Relation Age of Onset   • Hypothyroidism Mother    • Heart disease Father    • Hypertension Father    • Hyperlipidemia Father    • Hyperthyroidism Maternal Grandmother    • Hypothyroidism Maternal Aunt    • No Known Problems Brother        ROS: 14 point review of systems was performed and all other systems were reviewed and are negative except for documented findings in HPI and today's encounter.     Allergies:   Allergies   Allergen Reactions   • Penicillins Hives     Constitutional:  Denies fever, shaking or chills   Eyes:  Denies change in visual acuity   HENT:  Denies nasal congestion or sore throat   Respiratory:  Denies cough or shortness of breath   Cardiovascular:  Denies chest pain or severe LE edema   GI:  Denies abdominal pain, nausea, vomiting, bloody stools or diarrhea   Musculoskeletal:  Numbness, tingling, pain, or loss of motor function only as noted above in history of present illness.  : Denies painful urination or  "hematuria  Integument:  Denies rash, lesion or ulceration   Neurologic:  Denies headache or focal weakness  Endocrine:  Denies lymphadenopathy  Psych:  Denies confusion or change in mental status   Hem:  Denies active bleeding    OBJECTIVE:  Physical Exam: 51 y.o. female  Wt Readings from Last 3 Encounters:   01/12/22 59.9 kg (132 lb)   01/05/22 60.3 kg (133 lb)   12/13/21 59 kg (130 lb)     Ht Readings from Last 1 Encounters:   01/12/22 160 cm (63\")     Body mass index is 23.38 kg/m².  Vitals:    01/12/22 0818   Temp: 97.5 °F (36.4 °C)     Vital signs reviewed.     General Appearance:    Alert, cooperative, in no acute distress                  Eyes: conjunctiva clear  ENT: external ears and nose atraumatic  CV: no peripheral edema  Resp: normal respiratory effort  Skin: no rashes or wounds; normal turgor  Psych: mood and affect appropriate  Lymph: no nodes appreciated  Neuro: gross sensation intact  Vascular:  Palpable peripheral pulse in noted extremity  Musculoskeletal Extremities: Exam today shows pleasant woman BMI is 23 she has point tenderness over left SI joint IRENE test causes pain she has good range of motion of the hip Stinchfield is negative actually feels better doing Sangita type stretches    Radiology:   AP lateral lumbosacral spine taken in the office today for pain without comparison views available show a very slight retrolisthesis of L3 on 4 and a very slight scoliosis in the lumbar area noted in the AP.  AP of the hips lateral left hip taken the office today for pain with comparison view show minimal arthritic change but she does appear to have narrowing in both SI joints.        Assessment:     ICD-10-CM ICD-9-CM   1. Lumbar spine pain  M54.50 724.2   2. SI (sacroiliac) joint dysfunction  M53.3 724.6        MDM/Plan:   The diagnosis(es), natural history, pathophysiology and treatment for diagnosis(es) were discussed. Opportunity given and questions answered.  Biomechanics of pertinent body areas " discussed.  When appropriate, the use of ambulatory aids discussed.    EXERCISES:  Advice on benefits of, and types of regular/moderate exercise pertaining to orthopedic diagnosis(es).  Inflammation/pain control; with cold, heat, elevation and/or liniments discussed as appropriate  PT referral.  Recommend trying some formal physical therapy.  She could also try a tennis ball foam roller or massage which were explained to her as well.  She has a GI history and has actually had diverticulitis with a colon resection and some reflux I would not recommend anti-inflammatories but she can take Tylenol she can use rubs or local treatments on it and she says heat helps.  The therapy helps it fine if not I would recommend referring her to sports medicine for SI joint injections.    1/12/2022    Dictated utilizing Dragon dictation

## 2022-02-16 ENCOUNTER — TREATMENT (OUTPATIENT)
Dept: PHYSICAL THERAPY | Facility: CLINIC | Age: 52
End: 2022-02-16

## 2022-02-16 DIAGNOSIS — M53.3 SI (SACROILIAC) JOINT DYSFUNCTION: Primary | ICD-10-CM

## 2022-02-16 DIAGNOSIS — G89.29 CHRONIC LEFT-SIDED LOW BACK PAIN WITHOUT SCIATICA: ICD-10-CM

## 2022-02-16 DIAGNOSIS — M54.50 CHRONIC LEFT-SIDED LOW BACK PAIN WITHOUT SCIATICA: ICD-10-CM

## 2022-02-16 PROCEDURE — 97110 THERAPEUTIC EXERCISES: CPT | Performed by: PHYSICAL THERAPIST

## 2022-02-16 PROCEDURE — 97162 PT EVAL MOD COMPLEX 30 MIN: CPT | Performed by: PHYSICAL THERAPIST

## 2022-02-16 NOTE — PROGRESS NOTES
Physical Therapy Initial Evaluation and Plan of Care      Patient: Betty Garg   : 1970  Diagnosis/ICD-10 Code:  SI (sacroiliac) joint dysfunction [M53.3]  Referring practitioner: Basim Celestin MD  Date of Initial Visit: 2022  Today's Date: 2022  Patient seen for 1 sessions           Subjective Evaluation    History of Present Illness  Mechanism of injury: Patient complains of L SI joint pain that dates back to 2018. In 2018 she underwent laproscopic sx for diverticulitis and she is unsure whether or not that is linked to her SI pain. She tried therapy in 2019 which didn't seem to improve her symptoms. She has also tried dry needling.     Patient states her pain is constant but varies in intensity. The pain is worse with leg exercises (leg press, knee extension machine, hamstring curl machine) and prolonged walking. She describes the pain as either throbbing or a dull ache (usually worse the day after exercise). It is localized at the L SI joint and occasionally radiates to the front of the L hip. The pain does not shoot down the leg and there are no sensory changes.    PLOF: Patient goes to the gym 4x/week doing the 30 min circuit and 30 min of cardio (TM walking and elliptical). Patient also enjoys going on long walks 1-2x/weeks.     PMH: Diverticulitis. 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) Quality of life: good    Pain  Current pain ratin  At best pain ratin  At worst pain ratin  Location: L SI joint  Quality: throbbing and dull ache  Relieving factors: relaxation, rest, support and heat  Aggravating factors: stairs, standing, ambulation, squatting, lifting and repetitive movement  Progression: worsening    Social Support  Lives in: multiple-level home  Lives with: young children and spouse    Diagnostic Tests  X-ray: normal    Treatments  Previous treatment: physical therapy  Patient  Goals  Patient goals for therapy: increased strength, decreased pain, increased motion and return to sport/leisure activities             Objective          Neurological Testing     Sensation     Lumbar   Left   Intact: light touch    Right   Intact: light touch    Active Range of Motion     Lumbar   Flexion: WFL  Extension: WFL  Left lateral flexion: WFL  Right lateral flexion: Active right lumbar lateral flexion: 75% of WNL. with pain  Left Hip   External rotation (90/90): 40 degrees     Right Hip   External rotation (90/90): 32 degrees     Strength/Myotome Testing     Left Hip   Planes of Motion   Flexion: 4+  Extension: 4  Abduction: 5  External rotation: 4    Right Hip   Planes of Motion   Flexion: 4+  Extension: 4+  Abduction: 5  External rotation: 4+    Left Knee   Flexion: 5  Extension: 5    Right Knee   Flexion: 5  Extension: 5    Left Ankle/Foot   Dorsiflexion: 5    Right Ankle/Foot   Dorsiflexion: 5    Additional Strength Details  *Able to replicate pain with L hip ER AROM    Tests     Left Hip   Negative IRENE, scour, SI compression and SI distraction.   SLR: Negative.     Right Hip   Negative IRENE, scour, SI compression and SI distraction.   SLR: Negative.     Additional Tests Details  + Standing march on L SI     General Comments     Lumbar Comments  No pain noted with PA joint mobs to lumbar spine.       See Exercise, Manual, and Modality Logs for complete treatment.       Functional Outcome Score: Oswestry: 32% disablity    EXERCISES:  -PPT 20x  -LTR 20x  -Modified piriformis stretch 3x20 sec  -Piriformis stretch sitting 3x20 sec      Assessment & Plan     Assessment  Impairments: abnormal gait, abnormal or restricted ROM, activity intolerance, impaired physical strength, lacks appropriate home exercise program and pain with function  Functional Limitations: lifting, walking, uncomfortable because of pain, moving in bed, sitting and standing  Assessment details: Betty Garg is a 51 y.o.  year-old female referred to physical therapy for L SI joint pain. She presents with a evolving clinical presentation.  She has comorbidities of hyperlipidemia and mild to moderate B knee pain and personal factors of having to occasionally walk long distances for work which may affect her progress in the plan of care.  Signs and symptoms are consistent with physical therapy diagnosis of SI joint pain. Patient is appropriate for skilled physical therapy in order to reduce pain and increase ease with daily mobility.   Prognosis: good    Goals  Plan Goals: Short Term Goals for completion in 30 days:   -Patient will report a reduction in pain for 12-24 hours or greater following aquatic therapy session  -Patient will demonstrate good core stabilization/activation with PPT and TA contractions  -Patient will report 50% reduction in pain after going on long walk    LTGs for completion within 90 days:  -Patient will demonstrate independence with advanced HEP to allow for self management of condition  -Patient will reduce level of perceived disability on Oswestry from 32% to <20% disability to improve quality of life.  -Patient will report 75% reduction in pain after going on long walk    Plan  Therapy options: will be seen for skilled therapy services  Planned modality interventions: TENS, hydrotherapy, ultrasound and dry needling  Planned therapy interventions: postural training, stretching, therapeutic activities, IADL retraining, joint mobilization, transfer training, gait training, functional ROM exercises, home exercise program, strengthening, flexibility, balance/weight-bearing training, ADL retraining, abdominal trunk stabilization, neuromuscular re-education, motor coordination training and manual therapy  Other planned therapy interventions: Aquatic therapy  Frequency: 2x week (36 visits)  Plan details: Physical therapy for core stabilization, LE strength/stability, gait training, balance, and  posture        Timed:  Manual Therapy:         mins  03590;  Therapeutic Exercise:    10     mins  05449;     Neuromuscular Abraham:        mins  94044;    Therapeutic Activity:          mins  35016;     Gait Training:           mins  49414;     Ultrasound:          mins  78425;    Iontophoresis         mins 72928  Dry Needling        mins 11500/ 20561 (Self-pay)      Untimed:  Electrical Stimulation:         mins  90052 ( );  Traction:       mins  74630;   Low Eval          Mins  30484  Mod Eval     25     Mins  11249  High Eval                            Mins  45970    Timed Treatment:   10   mins   Total Treatment:     35   mins    PT SIGNATURE: Yue Campo PT     License Number: [id number]    Electronically signed by Yue Campo PT, 02/16/22, 9:14 AM EST    DATE TREATMENT INITIATED: 2/16/2022    Initial Certification  Certification Period: 5/17/2022  I certify that the therapy services are furnished while this patient is under my care.  The services outlined above are required by this patient, and will be reviewed every 90 days.     PHYSICIAN: Basim Ceelstin MD   NPI: [id number]                                         DATE:     Please sign and return via fax to [phone] Thank you, [facility].

## 2022-02-23 ENCOUNTER — TREATMENT (OUTPATIENT)
Dept: PHYSICAL THERAPY | Facility: CLINIC | Age: 52
End: 2022-02-23

## 2022-02-23 DIAGNOSIS — M54.50 CHRONIC LEFT-SIDED LOW BACK PAIN WITHOUT SCIATICA: ICD-10-CM

## 2022-02-23 DIAGNOSIS — M53.3 SI (SACROILIAC) JOINT DYSFUNCTION: Primary | ICD-10-CM

## 2022-02-23 DIAGNOSIS — G89.29 CHRONIC LEFT-SIDED LOW BACK PAIN WITHOUT SCIATICA: ICD-10-CM

## 2022-02-23 PROCEDURE — 97530 THERAPEUTIC ACTIVITIES: CPT | Performed by: PHYSICAL THERAPIST

## 2022-02-23 PROCEDURE — 97110 THERAPEUTIC EXERCISES: CPT | Performed by: PHYSICAL THERAPIST

## 2022-02-23 PROCEDURE — 97112 NEUROMUSCULAR REEDUCATION: CPT | Performed by: PHYSICAL THERAPIST

## 2022-02-23 NOTE — PROGRESS NOTES
"Physical Therapy Daily Progress Note    Patient: Betty Garg   : 1970  Diagnosis/ICD-10 Code:  SI (sacroiliac) joint dysfunction [M53.3]  Referring practitioner: Basim Celestin MD  Date of Initial Visit: Type: THERAPY  Noted: 2022  Today's Date: 2022  Patient seen for 2 sessions           Subjective Still feeling the pain on the LEFT side     Objective   EDUCATION as lower quarter dysfunction and treatment      READY (posture)   SET (core/ kegel)  GO    1- ABS   a. BACK in “imprint position”  b. LEGS up in “table top”  c. Upper body up leading with RIBS  d. OPPOSITE RIB/to HIP EXTENDING 1 LEG  i. SWITCH    GOAL 1 MIN  2- PIRIFORMIS STRETCH / “PRETZEL”  a. On you back  b. Sitting up tall  c. HOURLY   3- CONTROL SITTING  amazon search Merrithew PilOpenPeak mini stability ball 9”  a. Up tall  b. Control time   4- WALK with core/kegel on VOLUME 2 and arm swing BACK   5- TRY POWER STEPS inserts for shoes   6- SQUAT with dowel pablito for lumbar stabilization   7- HIP HINGE  with dowel pablito for lumbar stabilization       EDUCATION to stand with equal weight on both feet and to no \"dump\" on pelvic and work to increased core stabilization using pelvic floor / Kegel       Assessment/Plan  A:  Poor recruitment of core with closed chain activities exacerbation by poor SINGLE LEG STANCE LEFT > RIGHT secondary to significant over pronation   ALSO has right tight IRENE with noted trunk rotation    ALSO significant noted compensation with gait and excess pelvic motion   PLAN reassess new exercise and progress core stabilization and progress to closed chain activities without compensation          Timed:    Manual Therapy:    0     mins  89794;  Therapeutic Exercise:    23     mins  60608;     Neuromuscular Abraham:    9    mins  03886;    Therapeutic Activity:     10     mins  71783;     Gait Trainin     mins  76701;     Ultrasound:     0     mins  72244;    Electrical Stimulation:    0     mins  65846 (MC " );  Iontophoresis    0     mins 77735;  Aquatic Therapy    0     mins 34994;  Dry Needling              0     mins 20560/ 20561 (Self-pay)    Untimed:  Electrical Stimulation:    0     mins  42294 ( );  Traction:    0     mins  18930;     Timed Treatment:   42   mins   Total Treatment:     42   mins    Martha Naidu, PT  Physical Therapist    [place] License:[id number]

## 2022-03-02 ENCOUNTER — TREATMENT (OUTPATIENT)
Dept: PHYSICAL THERAPY | Facility: CLINIC | Age: 52
End: 2022-03-02

## 2022-03-02 DIAGNOSIS — M53.3 SI (SACROILIAC) JOINT DYSFUNCTION: Primary | ICD-10-CM

## 2022-03-02 DIAGNOSIS — G89.29 CHRONIC LEFT-SIDED LOW BACK PAIN WITHOUT SCIATICA: ICD-10-CM

## 2022-03-02 DIAGNOSIS — M54.50 CHRONIC LEFT-SIDED LOW BACK PAIN WITHOUT SCIATICA: ICD-10-CM

## 2022-03-02 PROCEDURE — 97110 THERAPEUTIC EXERCISES: CPT | Performed by: PHYSICAL THERAPIST

## 2022-03-02 NOTE — PROGRESS NOTES
Physical Therapy Daily Progress Note    Patient: Betty Garg   : 1970  Diagnosis/ICD-10 Code:  SI (sacroiliac) joint dysfunction [M53.3]  Referring practitioner: Basim Celestin MD  Date of Initial Visit: Type: THERAPY  Noted: 2022  Today's Date: 3/2/2022  Patient seen for 3 sessions           Subjective   Patient reports her pain is about the same overall but today it is especially flared up. She has been trying to abs at least a little bit daily.    Objective     See Exercise, Manual, and Modality Logs for complete treatment.       EXERCISES:  -Recumbent bike, 5 min  -PPT 20x (HEP)  -LTR 20x (HEP)  -Modified piriformis stretch 3x20 sec  -Piriformis stretch sitting 3x20 sec  -1K Leg Press, 130 lbs, 3x20  -6K hip abduction, 45 lbs, 3x15  -Lat Pull downs (P), 40 lbs 3x15  -Rows at cable, 17.5 lbs, 2x15  -Planks 30 sec x 2  -bird dogs, 5 x B for 3 sec hold  -Mini V core, 2x15  -Table top with core marches 1 min x 2      Assessment/Plan   Continued to focus on core stabilization and added some hip and postural strengthening. Provided patient with black band to attempt rows and lat pull downs at home. Cued for controlled breathing with core exercises.            Timed:    Manual Therapy:         mins  06650;  Therapeutic Exercise:    40     mins  17025;     Neuromuscular Abraham:        mins  73789;    Therapeutic Activity:          mins  43440;     Gait Training:           mins  90475;     Ultrasound:          mins  40296;    Electrical Stimulation:         mins  39024 ( );  Iontophoresis         mins 53049;  Aquatic Therapy         mins 30324;  Dry Needling                   mins 70444/  (Self-pay)    Untimed:  Electrical Stimulation:         mins  76656 ( );  Traction:         mins  01870;     Timed Treatment:   40   mins   Total Treatment:     40   mins    Yue Campo PT  Physical Therapist    [place] License:[id number]

## 2022-03-15 ENCOUNTER — TREATMENT (OUTPATIENT)
Dept: PHYSICAL THERAPY | Facility: CLINIC | Age: 52
End: 2022-03-15

## 2022-03-15 DIAGNOSIS — G89.29 CHRONIC LEFT-SIDED LOW BACK PAIN WITHOUT SCIATICA: ICD-10-CM

## 2022-03-15 DIAGNOSIS — M54.50 CHRONIC LEFT-SIDED LOW BACK PAIN WITHOUT SCIATICA: ICD-10-CM

## 2022-03-15 DIAGNOSIS — M53.3 SI (SACROILIAC) JOINT DYSFUNCTION: Primary | ICD-10-CM

## 2022-03-15 PROCEDURE — 97110 THERAPEUTIC EXERCISES: CPT | Performed by: PHYSICAL THERAPIST

## 2022-03-15 NOTE — PROGRESS NOTES
Physical Therapy Daily Progress Note    Patient: Betty Garg   : 1970  Diagnosis/ICD-10 Code:  SI (sacroiliac) joint dysfunction [M53.3]  Referring practitioner: Basim Celestin MD  Date of Initial Visit: Type: THERAPY  Noted: 2022  Today's Date: 3/23/2022  Patient seen for 4 sessions           Subjective  It is always there but worse with activity   Last time worse with machines and it was throbbing worse with quads/ LAQ and hamstring     TODAY a little sore have been doing an exercise class but is protecting the back   DID Abs in the class and irritated ti    Objective   LTR  Feet together and feet apart x 5 each     Piriformis stretch    Push knee down 20 sec x 2   Pull opposite thigh to chest 30 sec x 2 bilaterally      segmenatl flexion full without compensation    Difficulty to fully asssess if LEFT SI is moving 1--% but nearly without exacerbation     Reformer stretching in mermaid position   Single x 3    Add rotation + push up x 5   Add extension x 5   All with bilaterally with verbal and tactile cueing for proper technique   Finally able to feel a stretch in the sore spot     FOCUS on LEFT SIDE FLEXION  ROM/ stretch in various positions to do during the day and at work     Good segmental flexion   Rotation WNL bilaterally     Windmill stretch too difficult         Assessment/Plan    A: stiffness in LEFT QL able to stretch with SIDE FLEXION  But caution to not over do   PLAN reasses stretching and progress therex        Timed:    Manual Therapy:    0     mins  45706;  Therapeutic Exercise:    42     mins  61307;     Neuromuscular Abraham:    0    mins  27084;    Therapeutic Activity:     0     mins  66436;     Gait Trainin     mins  65525;     Ultrasound:     0     mins  49722;    Electrical Stimulation:    0     mins  70773 ( );  Iontophoresis    0     mins 73915;  Aquatic Therapy    0     mins 75327;  Dry Needling              0     mins /   (Self-pay)    Untimed:  Electrical Stimulation:    0     mins  25715 ( );  Traction:    0     mins  79332;     Timed Treatment:   42   mins   Total Treatment:     42   mins    Martha Naidu PT  Physical Therapist    [place] License:[id number]

## 2022-03-23 ENCOUNTER — TREATMENT (OUTPATIENT)
Dept: PHYSICAL THERAPY | Facility: CLINIC | Age: 52
End: 2022-03-23

## 2022-03-23 DIAGNOSIS — G89.29 CHRONIC LEFT-SIDED LOW BACK PAIN WITHOUT SCIATICA: ICD-10-CM

## 2022-03-23 DIAGNOSIS — M53.3 SI (SACROILIAC) JOINT DYSFUNCTION: Primary | ICD-10-CM

## 2022-03-23 DIAGNOSIS — M54.50 CHRONIC LEFT-SIDED LOW BACK PAIN WITHOUT SCIATICA: ICD-10-CM

## 2022-03-23 PROCEDURE — 97110 THERAPEUTIC EXERCISES: CPT | Performed by: PHYSICAL THERAPIST

## 2022-03-23 PROCEDURE — 97112 NEUROMUSCULAR REEDUCATION: CPT | Performed by: PHYSICAL THERAPIST

## 2022-03-23 NOTE — PROGRESS NOTES
Physical Therapy Daily Progress Note    Patient: Betty Garg   : 1970  Diagnosis/ICD-10 Code:  SI (sacroiliac) joint dysfunction [M53.3]  Referring practitioner: Basim Celestin MD  Date of Initial Visit: Type: THERAPY  Noted: 2022  Today's Date: 3/23/2022  Patient seen for 5 sessions           Subjective sitting 4 hours/day at computer and 3 hours sitting in car     Objective   See Exer ROM WNL with     Pain on LEFT with SIDE FLEXION  bilaterally      MAT WORK  1- ABS  Goal 1 min   2- SIDELYING leg lift GOAL 2 sets of 15  a. Knee straight   ankle flexed  b. Start level and lift up and slightly behind you   3- SIDELYING ROTATION STRETCH  running man  a. 3 x arms only   b. 3-5x arms and legs  4- SEMI PRONE FROG STRETCH   a. Laying on belly lift L leg up to stretch   b. Relax into stretch 1-5 min  5- WALK with core on  a. Focus on bending big toe “PUSH OFF”  6- 1 leg balance work on L       SIDE FLEXION  Stretch on weighted lat pull down x 10 sec x 3 bilaterally    Add rotation x 5 sec bilaterally    Trying to open up LEFT QL area       Assessment/Plan  A  Trying to open up LEFT QL as well as hips to improved single limb stance and closed chain activities given she has come hip coxa vara/valgus  PLAN reassess new exercise          Timed:    Manual Therapy:    0     mins  34328;  Therapeutic Exercise:    30     mins  39166;     Neuromuscular Abraham:    15    mins  36472;    Therapeutic Activity:     0     mins  30488;     Gait Trainin     mins  12442;     Ultrasound:     0     mins  24938;    Electrical Stimulation:    0     mins  81995 ( );  Iontophoresis    0     mins 33058;  Aquatic Therapy    0     mins 46299;  Dry Needling              0     mins 08450/  (Self-pay)    Untimed:  Electrical Stimulation:    0     mins  94451 (MC );  Traction:    0     mins  79626;     Timed Treatment:   45   mins   Total Treatment:     45   mins    Martha Naidu PT  Physical  Therapist    [place] License:[id number]

## 2022-04-05 ENCOUNTER — TELEPHONE (OUTPATIENT)
Dept: CARDIOLOGY | Facility: CLINIC | Age: 52
End: 2022-04-05

## 2022-04-05 RX ORDER — FLECAINIDE ACETATE 50 MG/1
50 TABLET ORAL 3 TIMES DAILY
Qty: 90 TABLET | Refills: 6 | Status: SHIPPED | OUTPATIENT
Start: 2022-04-05 | End: 2022-11-17

## 2022-04-05 NOTE — TELEPHONE ENCOUNTER
I called and spoke with the patient to verify the dose of Flecainide that she is currently taking. She states that she is taking Flecainide 50 mg TID. Refill for Flecainide sent to [facility] pharmacy.

## 2022-04-13 ENCOUNTER — TREATMENT (OUTPATIENT)
Dept: PHYSICAL THERAPY | Facility: CLINIC | Age: 52
End: 2022-04-13

## 2022-04-13 DIAGNOSIS — M53.3 SI (SACROILIAC) JOINT DYSFUNCTION: Primary | ICD-10-CM

## 2022-04-13 DIAGNOSIS — M54.50 CHRONIC LEFT-SIDED LOW BACK PAIN WITHOUT SCIATICA: ICD-10-CM

## 2022-04-13 DIAGNOSIS — G89.29 CHRONIC LEFT-SIDED LOW BACK PAIN WITHOUT SCIATICA: ICD-10-CM

## 2022-04-13 PROCEDURE — 97110 THERAPEUTIC EXERCISES: CPT | Performed by: PHYSICAL THERAPIST

## 2022-04-13 NOTE — PROGRESS NOTES
30-Day / 10-Visit Progress Note         Patient: Betty Garg   : 1970  Diagnosis/ICD-10 Code:  SI (sacroiliac) joint dysfunction [M53.3]  Referring practitioner: Basim Celestin MD  Date of Initial Visit: Type: THERAPY  Noted: 2022  Today's Date: 2022  Patient seen for 6 sessions      Subjective:     Clinical Progress: worse after long car ride for spring break   Home Program Compliance: Yes  Treatment has included:  therapeutic exercise, therapeutic activity, neuro-muscular retraining , gait training and patient education with home exercise program     Subjective   Was out of town for spring break with long car ride hurt for a couple of days  Pain today  -5/10   Sleeping OK   Pain in the same spot not traveling down the leg or into buttocks  Walking on incline of beach also exacerbated it     Objective   Crests level in stadning    Noted   No tenderness to palpatoin at SIJ  LUMBAR  AROM   FLEXION  100% with good segmental mobility    EXTENSION  100% wihtou exacerbation    SIDE FLEXION  RIGHT easier and pull on LEFT    LEFT decreased no pain    QUADRANT to RIGHT pain on LEFT   SINGLE LEG STANCE   LEFT with increased trunk SIDE FLEXION  LEFT   IRENE LEFT decreased 30 %  RIGHT   20%   SIDE LYING hip abd  LEFT 4/5  RIGHT  5/5      piriformis stretch X 30 SEC X 2    2 ways   EDUCATION to have  to help increase vigor   Hip flexor stretch off edge of bed but does not feel significant stretch     Sitting piriformis stretch 30 sec x 2 bilaterally     Ab work with verbal cueing for proper technique x 30 sec x 3   SIDE LYING hip abd with verbal and tactile cueing for proper glut recruitment    X 10 2 sets         Functional Outcome Score: back Oswestry 22%  Improved from 32% at initial evaluation    Assessment/Plan     Patient has been seen in PHYSICALTHERAPY for 6 visits for EDUCATION and progression of therex and symptom management  She was out of town this past week with significant  exacerbation       Plan Goals: Short Term Goals for completion in 30 days:   -Patient will report a reduction in pain for 12-24 hours or greater following aquatic therapy session  -Patient will demonstrate good core stabilization/activation with PPT and TA contractions  MET   -Patient will report 50% reduction in pain after going on long walk  PARTIALLY MET was progressing until recent vacation     LTGs for completion within 90 days:  -Patient will demonstrate independence with advanced HEP to allow for self management of condition  -Patient will reduce level of perceived disability on Oswestry from 32% to <20% disability to improve quality of life.    PARTIALLY MET improved to 22%   -Patient will report 75% reduction in pain after going on long walk     Recommendations: Continue as planned  Timeframe: 3 months  Prognosis to achieve goals: good    PT Signature: Martha Naidu PT    License Number: [id number]    Electronically signed by Martha Naidu PT, 22, 8:38 AM EDT      Based upon review of the patient's progress and continued therapy plan, it is my medical opinion that Betty Garg should continue physical therapy treatment at [facility]  [address] [place] [address] [address] [place] [address]  [phone].    Signature: __________________________________  Basim Celestin MD    Timed:  Manual Therapy:    00     mins  66618;  Therapeutic Exercise:    40     mins  18258;     Neuromuscular Abraham:    0    mins  32516;    Therapeutic Activity:     0     mins  61452;     Gait Trainin     mins  40576;     Ultrasound:     0     mins  24860;    Iontophoresis    0     mins 61822;  Dry Needling              0     mins 35890/00283 (Self-pay)    Untimed:  Electrical Stimulation:    0     mins  31004 (MC );  Traction:    0     mins  07896;     Timed Treatment:   40   mins   Total Treatment:     40   mins

## 2022-04-27 ENCOUNTER — TREATMENT (OUTPATIENT)
Dept: PHYSICAL THERAPY | Facility: CLINIC | Age: 52
End: 2022-04-27

## 2022-04-27 DIAGNOSIS — M53.3 SI (SACROILIAC) JOINT DYSFUNCTION: Primary | ICD-10-CM

## 2022-04-27 DIAGNOSIS — M54.50 CHRONIC LEFT-SIDED LOW BACK PAIN WITHOUT SCIATICA: ICD-10-CM

## 2022-04-27 DIAGNOSIS — G89.29 CHRONIC LEFT-SIDED LOW BACK PAIN WITHOUT SCIATICA: ICD-10-CM

## 2022-04-27 PROCEDURE — 97110 THERAPEUTIC EXERCISES: CPT | Performed by: PHYSICAL THERAPIST

## 2022-04-27 NOTE — PROGRESS NOTES
Physical Therapy Daily Progress Note    Patient: Betty Garg   : 1970  Diagnosis/ICD-10 Code:  SI (sacroiliac) joint dysfunction [M53.3]  Referring practitioner: Basim Celestin MD  Date of Initial Visit: Type: THERAPY  Noted: 2022  Today's Date: 2022  Patient seen for 7 sessions           Subjective flare up last week after doing luges   Today still irritiable -6/10 was a 8/10    Objective   Symptom management with decompression    Contact MD about NSAIDs     Piriformis stretch  On edge of  Bed   Add rotation for QL     Reformer stretches   Hip flexor bilaterally x 4   Adductor bilaterally x 3   Mermaid series bilaterally     SIDE FLEXION  c 4    Rotation x 3     Rotation + extension x 3     Lunge technique to    Shorten stride    Keep weight on front foot   Bias weight to heel     Ed to increase core / pelvic floor work for all     Trial kinsio taping to bilaterally LS PVM with parallel and cross body stretches          Assessment/Plan    A: exacerbation that is still significant and not able to stretch out of it       Hope NSAIDS will decrease irritation and move into prevention mode  PLAN reassess stretches        Timed:    Manual Therapy:    0     mins  64909;  Therapeutic Exercise:    40     mins  98442;     Neuromuscular Abraham:    0    mins  81419;    Therapeutic Activity:     0     mins  50529;     Gait Trainin     mins  32360;     Ultrasound:     0     mins  72895;    Electrical Stimulation:    0     mins  35741 (MC );  Iontophoresis    0     mins 79522;  Aquatic Therapy    0     mins 42901;  Dry Needling              0     mins 31554/ 37779 (Self-pay)    Untimed:  Electrical Stimulation:    0     mins  11024 (MC );  Traction:    0     mins  67643;     Timed Treatment:   40   mins   Total Treatment:     40   mins    Martha Naidu PT  Physical Therapist    [place] License:[id number]

## 2022-05-11 ENCOUNTER — TREATMENT (OUTPATIENT)
Dept: PHYSICAL THERAPY | Facility: CLINIC | Age: 52
End: 2022-05-11

## 2022-05-11 DIAGNOSIS — G89.29 CHRONIC LEFT-SIDED LOW BACK PAIN WITHOUT SCIATICA: ICD-10-CM

## 2022-05-11 DIAGNOSIS — M53.3 SI (SACROILIAC) JOINT DYSFUNCTION: Primary | ICD-10-CM

## 2022-05-11 DIAGNOSIS — M54.50 CHRONIC LEFT-SIDED LOW BACK PAIN WITHOUT SCIATICA: ICD-10-CM

## 2022-05-11 PROCEDURE — 97110 THERAPEUTIC EXERCISES: CPT | Performed by: PHYSICAL THERAPIST

## 2022-05-11 PROCEDURE — 97530 THERAPEUTIC ACTIVITIES: CPT | Performed by: PHYSICAL THERAPIST

## 2022-05-11 NOTE — PROGRESS NOTES
Physical Therapy Re-certification         Patient: Betty Garg   : 1970  Diagnosis/ICD-10 Code:  SI (sacroiliac) joint dysfunction [M53.3]  Referring practitioner: Basim Celestin MD  Date of Initial Visit: Type: THERAPY  Noted: 2022  Today's Date: 2022  Patient seen for 8 sessions      Subjective:     Clinical Progress: unchanged  Home Program Compliance: Yes  Treatment has included:  therapeutic exercise, manual therapy, therapeutic activity, neuro-muscular retraining  and patient education with home exercise program     Subjective Flare up with derby events but really is never pain free just increases in intensity   Pain today 7/10   DENIES numbness or thingling in feet or toes   Pain is located   In LEFT  L5S1 area  SIJ and is deeper than can palpate     Objective   Rests level     Pelvis rotasted to RIGHT     LUMBAR AROM     FLEXION  100% wih good segmental mobility     SIDE FLEXION   75% bilaterally with stiffness       SINGLE LEG STANCE  10+ sec with noted trunk compensation bilaterally      RIGHT  With RIGHT tunk CB    LEFT with more upright posture     IRENE   RIGHT  Decreased 30#  LEFT deres20% with noted trunk compensation bilaterally no groin pain       EDUCATION discuss all HEP and posture/body mechanics/symtpom management   Also may benefit from further testing to rule out any underlying pathology as symptom unchanged  May benefit from injections to help interrupt pain then progress with exercise for scoliosis management     Functional Outcome Score: BACK OSWESTRY  38%      Assessment/Plan     Patient has been seen for 8 visits for progression of exercise and instruction for posture body mechanics and symptom management  She may benefit from further testing to rule out any underlying pathology and possibly injections for symptom management then progress with scoliosis management     Plan Goals: Short Term Goals for completion in 30 days:   -Patient will report a reduction in  pain for 12-24 hours or greater following aquatic therapy session     NA  -Patient will demonstrate good core stabilization/activation with PPT and TA contractions      MET   -Patient will report 50% reduction in pain after going on long walk    ONGOING     LTGs for completion within 90 days:  -Patient will demonstrate independence with advanced HEP to allow for self management of condition  -Patient will reduce level of perceived disability on Oswestry from 32% to <20% disability to improve quality of life.  -Patient will report 75% reduction in pain after going on long walk   Recommendations: Continue with recommendations follow up with MD regarding further testig and or injections  Timeframe: 3 months  Prognosis to achieve goals: good    PT Signature: Martha Naidu PT    License Number: [id number]    Electronically signed by Martha Naidu PT, 22, 9:37 AM EDT      Based upon review of the patient's progress and continued therapy plan, it is my medical opinion that Betty Garg should continue physical therapy treatment at [facility]  [address] [place] [address] [address]   [place] [address] [phone]. office phone  [phone] office fax      Re-certification  Certification Period: 2022  I certify that the therapy services are furnished while this patient is under my care.  The services outlined above are required by this patient, and will be reviewed every 90 days.     PHYSICIAN: Basim Celestin MD   NPI: [id number]                                         DATE:     Signature: __________________________________  Basim Celestin MD      Please sign and return via fax to [phone]   Thank you, [facility].    Timed:  Manual Therapy:    0     mins  05015;  Therapeutic Exercise:    15     mins  96173;     Neuromuscular Abraham:    0    mins  59557;    Therapeutic Activity:     25     mins  84746;     Gait Trainin     mins  12047;      Ultrasound:     0     mins  42875;    Iontophoresis    0     mins 84671;  Orthotic Mgmt/training  0     mins 06583;  Orthotic check out  0     mins 15777;  Canalith Repositioning  0     mins 99072;  Aquatic Therapy    0     mins 66614;  Dry Needling              0     mins 05418/20561 (self-pay)    Untimed:  Electrical Stimulation:    0     mins  78773 ( );  Traction:    0     mins  95445;   :  Timed Treatment:   40   mins   Total Treatment:     40   mins

## 2022-06-13 ENCOUNTER — OFFICE (OUTPATIENT)
Dept: URBAN - METROPOLITAN AREA CLINIC 75 | Facility: CLINIC | Age: 52
End: 2022-06-13

## 2022-06-13 VITALS — WEIGHT: 132 LBS | HEIGHT: 64 IN | SYSTOLIC BLOOD PRESSURE: 118 MMHG | DIASTOLIC BLOOD PRESSURE: 78 MMHG

## 2022-06-13 DIAGNOSIS — R10.31 RIGHT LOWER QUADRANT PAIN: ICD-10-CM

## 2022-06-13 DIAGNOSIS — K59.00 CONSTIPATION, UNSPECIFIED: ICD-10-CM

## 2022-06-13 PROCEDURE — 99214 OFFICE O/P EST MOD 30 MIN: CPT | Performed by: NURSE PRACTITIONER

## 2022-06-22 ENCOUNTER — OFFICE VISIT (OUTPATIENT)
Dept: ORTHOPEDIC SURGERY | Facility: CLINIC | Age: 52
End: 2022-06-22

## 2022-06-22 VITALS — BODY MASS INDEX: 24.18 KG/M2 | TEMPERATURE: 98.6 F | HEIGHT: 62 IN | WEIGHT: 131.4 LBS

## 2022-06-22 DIAGNOSIS — M53.3 SI (SACROILIAC) JOINT DYSFUNCTION: Primary | ICD-10-CM

## 2022-06-22 PROCEDURE — 99214 OFFICE O/P EST MOD 30 MIN: CPT | Performed by: ORTHOPAEDIC SURGERY

## 2022-06-22 NOTE — PROGRESS NOTES
"Patient Name: Betty Garg   YOB: 1970  Referring Primary Care Physician: Job Jackson MD  BMI: Body mass index is 24.03 kg/m².    Chief Complaint:    Chief Complaint   Patient presents with   • Left Hip - Follow-up        HPI:     Betty Garg is a 51 y.o. female who presents today for evaluation of   Chief Complaint   Patient presents with   • Left Hip - Follow-up   .  Patient follows up today with \"left hip\" pain I saw her about 6 months ago.  He was felt to have sacroiliac joint pain and suggested physical therapy and if that did not get better to go to sports medicine for sacroiliac joint injection.  He says she has pain in same place therapy helped some but is still nagging her more than anything she enjoys exercising being active and says she will continue to do so even if it bothers her      Subjective   Medications:   Home Medications:  Current Outpatient Medications on File Prior to Visit   Medication Sig   • aspirin 81 MG tablet Take 81 mg by mouth daily. oral delayed release tablet   • cholecalciferol (VITAMIN D3) 25 MCG (1000 UT) tablet Take 5,000 Units by mouth Daily.   • flecainide (TAMBOCOR) 50 MG tablet Take 1 tablet by mouth 3 (Three) Times a Day.   • multivitamin with minerals tablet tablet Take 1 tablet by mouth Daily.   • pantoprazole (PROTONIX) 40 MG EC tablet TAKE ONE TABLET BY MOUTH DAILY   • Probiotic Product (PROBIOTIC DAILY PO) Take 1 tablet by mouth Daily.   • rosuvastatin (CRESTOR) 20 MG tablet Take 20 mg by mouth Daily.   • vitamin C (ASCORBIC ACID) 500 MG tablet Take 500 mg by mouth Daily.   • Wheat Dextrin (BENEFIBER DRINK MIX PO) Take 1 bottle by mouth Daily.   • Zinc 50 MG capsule Take 50 mg by mouth Daily.   • coenzyme Q10 100 MG capsule Take 100 mg by mouth Daily.     No current facility-administered medications on file prior to visit.     Current Medications:  Scheduled Meds:  Continuous Infusions:No current facility-administered medications for " this visit.    PRN Meds:.    I have reviewed the patient's medical history in detail and updated the computerized patient record.  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    • Skin cancer     SKIN   • Wears glasses         Past Surgical History:   Procedure Laterality Date   • COLON RESECTION     • COLONOSCOPY     • PAP SMEAR  01/2014        Social History     Occupational History   • Occupation: Sales   Tobacco Use   • Smoking status: Never Smoker   • Smokeless tobacco: Never Used   Substance and Sexual Activity   • Alcohol use: Yes     Comment: 2/week   • Drug use: No   • Sexual activity: Defer     Partners: Male      Social History     Social History Narrative    Lives at home with  and children        Family History   Problem Relation Age of Onset   • Hypothyroidism Mother    • Heart disease Father    • Hypertension Father    • Hyperlipidemia Father    • Hyperthyroidism Maternal Grandmother    • Hypothyroidism Maternal Aunt    • No Known Problems Brother        ROS: 14 point review of systems was performed and all other systems were reviewed and are negative except for documented findings in HPI and today's encounter.     Allergies:   Allergies   Allergen Reactions   • Penicillins Hives     Constitutional:  Denies fever, shaking or chills   Eyes:  Denies change in visual acuity   HENT:  Denies nasal congestion or sore throat   Respiratory:  Denies cough or shortness of breath   Cardiovascular:  Denies chest pain or severe LE edema   GI:  Denies abdominal pain, nausea, vomiting, bloody stools or diarrhea   Musculoskeletal:  Numbness, tingling, pain, or loss of motor function only as noted above in history of present illness.  : Denies  "painful urination or hematuria  Integument:  Denies rash, lesion or ulceration   Neurologic:  Denies headache or focal weakness  Endocrine:  Denies lymphadenopathy  Psych:  Denies confusion or change in mental status   Hem:  Denies active bleeding    OBJECTIVE:  Physical Exam: 51 y.o. female  Wt Readings from Last 3 Encounters:   06/22/22 59.6 kg (131 lb 6.4 oz)   01/12/22 59.9 kg (132 lb)   01/05/22 60.3 kg (133 lb)     Ht Readings from Last 1 Encounters:   06/22/22 157.5 cm (62\")     Body mass index is 24.03 kg/m².  Vitals:    06/22/22 0921   Temp: 98.6 °F (37 °C)     Vital signs reviewed.     General Appearance:    Alert, cooperative, in no acute distress                  Eyes: conjunctiva clear  ENT: external ears and nose atraumatic  CV: no peripheral edema  Resp: normal respiratory effort  Skin: no rashes or wounds; normal turgor  Psych: mood and affect appropriate  Lymph: no nodes appreciated  Neuro: gross sensation intact  Vascular:  Palpable peripheral pulse in noted extremity  Musculoskeletal Extremities: Pleasant young woman she has deep tenderness over the left sacroiliac joint Emma test shows a little stiffness and causes some pain in the left more than the right flexion add duction internal rotation does not cause pain she has good range of motion of the hip otherwise.  No radicular pain that goes all the way down her legs.    Radiology:   Previous AP of the hips lateral left hip taken the office for complaints of \"hip\" pain and AP lateral lumbosacral spine do show mild degenerative changes in the lower part lumbar spine especially seen on the L4-5 foramina and she has good joint spaces in her hips with some narrowing in her sacroiliac joint more on the left than the right        Assessment:     ICD-10-CM ICD-9-CM   1. SI (sacroiliac) joint dysfunction  M53.3 724.6        MDM/Plan:   The diagnosis(es), natural history, pathophysiology and treatment for diagnosis(es) were discussed. Opportunity given " and questions answered.  Biomechanics of pertinent body areas discussed.  When appropriate, the use of ambulatory aids discussed.    Inflammation/pain control; with cold, heat, elevation and/or liniments discussed as appropriate  HOME EXERCISE/PT program encouraged  SPECIALTY REFERRAL  Encouraged home program she could try massage if she wanted to but suggest she go to sports medicine and see if they could inject her sacroiliac joint and see how that did for her that fails to get her better could always do an MRI of the back and refer her to a back specialist    6/22/2022    Dictated utilizing Dragon dictation

## 2022-07-25 ENCOUNTER — OFFICE VISIT (OUTPATIENT)
Dept: SPORTS MEDICINE | Facility: CLINIC | Age: 52
End: 2022-07-25

## 2022-07-25 VITALS
OXYGEN SATURATION: 99 % | HEART RATE: 85 BPM | TEMPERATURE: 97.7 F | SYSTOLIC BLOOD PRESSURE: 118 MMHG | RESPIRATION RATE: 16 BRPM | BODY MASS INDEX: 24.48 KG/M2 | HEIGHT: 62 IN | DIASTOLIC BLOOD PRESSURE: 70 MMHG | WEIGHT: 133 LBS

## 2022-07-25 DIAGNOSIS — G89.29 CHRONIC LEFT SACROILIAC PAIN: Primary | ICD-10-CM

## 2022-07-25 DIAGNOSIS — M53.3 CHRONIC LEFT SACROILIAC PAIN: Primary | ICD-10-CM

## 2022-07-25 PROCEDURE — 20611 DRAIN/INJ JOINT/BURSA W/US: CPT | Performed by: FAMILY MEDICINE

## 2022-07-25 PROCEDURE — 99214 OFFICE O/P EST MOD 30 MIN: CPT | Performed by: FAMILY MEDICINE

## 2022-07-25 RX ORDER — TRIAMCINOLONE ACETONIDE 40 MG/ML
40 INJECTION, SUSPENSION INTRA-ARTICULAR; INTRAMUSCULAR ONCE
Status: COMPLETED | OUTPATIENT
Start: 2022-07-25 | End: 2022-07-25

## 2022-07-25 RX ORDER — OMEPRAZOLE 40 MG/1
CAPSULE, DELAYED RELEASE ORAL
COMMUNITY
Start: 2022-07-11

## 2022-07-25 RX ADMIN — TRIAMCINOLONE ACETONIDE 40 MG: 40 INJECTION, SUSPENSION INTRA-ARTICULAR; INTRAMUSCULAR at 10:29

## 2022-07-29 ENCOUNTER — TELEPHONE (OUTPATIENT)
Dept: SPORTS MEDICINE | Facility: CLINIC | Age: 52
End: 2022-07-29

## 2022-07-29 DIAGNOSIS — G89.29 CHRONIC LEFT SACROILIAC PAIN: Primary | ICD-10-CM

## 2022-07-29 DIAGNOSIS — M53.3 CHRONIC LEFT SACROILIAC PAIN: Primary | ICD-10-CM

## 2022-08-02 NOTE — TELEPHONE ENCOUNTER
Yes we can order an MRI of the lumbar spine without contrast for chronic low back pain, failed to respond other treatments

## 2022-08-12 ENCOUNTER — APPOINTMENT (OUTPATIENT)
Dept: OTHER | Facility: HOSPITAL | Age: 52
End: 2022-08-12

## 2022-08-12 ENCOUNTER — HOSPITAL ENCOUNTER (OUTPATIENT)
Dept: MRI IMAGING | Facility: HOSPITAL | Age: 52
Discharge: HOME | End: 2022-08-12

## 2022-08-12 DIAGNOSIS — M53.3 CHRONIC LEFT SACROILIAC PAIN: ICD-10-CM

## 2022-08-12 DIAGNOSIS — G89.29 CHRONIC LEFT SACROILIAC PAIN: ICD-10-CM

## 2022-08-12 DIAGNOSIS — Z09 FOLLOW-UP EXAM: ICD-10-CM

## 2022-08-12 PROCEDURE — 72148 MRI LUMBAR SPINE W/O DYE: CPT

## 2022-08-24 ENCOUNTER — OFFICE VISIT (OUTPATIENT)
Dept: SPORTS MEDICINE | Facility: CLINIC | Age: 52
End: 2022-08-24

## 2022-08-24 VITALS
BODY MASS INDEX: 24.48 KG/M2 | SYSTOLIC BLOOD PRESSURE: 118 MMHG | HEIGHT: 62 IN | WEIGHT: 133 LBS | HEART RATE: 99 BPM | TEMPERATURE: 98.2 F | RESPIRATION RATE: 16 BRPM | OXYGEN SATURATION: 98 % | DIASTOLIC BLOOD PRESSURE: 66 MMHG

## 2022-08-24 DIAGNOSIS — M51.37 DDD (DEGENERATIVE DISC DISEASE), LUMBOSACRAL: Primary | ICD-10-CM

## 2022-08-24 PROCEDURE — 99213 OFFICE O/P EST LOW 20 MIN: CPT | Performed by: FAMILY MEDICINE

## 2022-09-06 NOTE — PROGRESS NOTES
"Betty is a 52 y.o. year old female     Chief Complaint   Patient presents with   • Back Pain     MRI F/U on SI joint pain        History of Present Illness  HPI   Here to follow-up on low back/sacroiliac pain.      Review of Systems    /66 (BP Location: Right arm, Patient Position: Sitting, Cuff Size: Adult)   Pulse 99   Temp 98.2 °F (36.8 °C)   Resp 16   Ht 157.5 cm (62.01\")   Wt 60.3 kg (133 lb)   LMP  (LMP Unknown)   SpO2 98%   BMI 24.32 kg/m²      Physical Exam    Vital signs reviewed.   General: No acute distress.      MSK Exam:  Ortho Exam        Diagnoses and all orders for this visit:    DDD (degenerative disc disease), lumbosacral  -     Ambulatory Referral to Pain Management    We reviewed her MRI and discuss ongoing pain.  Essentially she is unchanged since her last visit.  Discussed the nature of findings and we agreed that pain management be the next best step.  Recommend continued physical therapy for lumbar pelvic stability control.  "

## 2022-09-21 ENCOUNTER — OFFICE VISIT (OUTPATIENT)
Dept: PAIN MEDICINE | Facility: CLINIC | Age: 52
End: 2022-09-21

## 2022-09-21 ENCOUNTER — TELEPHONE (OUTPATIENT)
Dept: SPORTS MEDICINE | Facility: CLINIC | Age: 52
End: 2022-09-21

## 2022-09-21 VITALS
WEIGHT: 132.4 LBS | HEIGHT: 62 IN | OXYGEN SATURATION: 97 % | DIASTOLIC BLOOD PRESSURE: 78 MMHG | BODY MASS INDEX: 24.37 KG/M2 | HEART RATE: 93 BPM | SYSTOLIC BLOOD PRESSURE: 135 MMHG | RESPIRATION RATE: 12 BRPM

## 2022-09-21 DIAGNOSIS — G89.29 CHRONIC LEFT-SIDED LOW BACK PAIN WITHOUT SCIATICA: ICD-10-CM

## 2022-09-21 DIAGNOSIS — M47.816 LUMBAR FACET ARTHROPATHY: Primary | ICD-10-CM

## 2022-09-21 DIAGNOSIS — M54.50 CHRONIC LEFT-SIDED LOW BACK PAIN WITHOUT SCIATICA: ICD-10-CM

## 2022-09-21 PROCEDURE — 99215 OFFICE O/P EST HI 40 MIN: CPT | Performed by: NURSE PRACTITIONER

## 2022-09-21 NOTE — PROGRESS NOTES
"CHIEF COMPLAINT  New patient ref by Bernabe Goode MD.M51.37 (ICD-10-CM) - DDD (degenerative disc disease), lumbosacral.  Patient in office to evaluate degenerative disc disease onset on and off over the past 3 years but has gotten worse over the last 3-6 months    Subjective   Betty Garg is a 52 y.o. female.   She presents to the office for evaluation of back pain. She was referred here by Dr. Goode.    Her pain started ~3 years ago. Her pain started around the same time that she had diverticulitis and a colon resection.  She does think that her decrease in core exercises around that time may have contributed to her increasing low back pain.     Today her pain is 6/10VAS in severity. Her pain is in her left low back and she describes this as a \"deep ache\" that does not radiate into her lower extremities. Her pain is worsened by prolonged sitting, particularly in the car.  It is also worsened by using some of the leg machines at the gym.  Her pain is improved some by stretching. She will also utilize OTC ibuprofen and tylenol at times.     Past pain medications: None     Current pain medications: Ibuprofen and Tylenol PRN     Past therapies:  Physical Therapy: Yes, no benefit  Chiropractor: None  Massage Therapy: None  TENS: None  Neck or back surgery: None  Past pain management: None  Dry Needling: Yes, no benefit     Previous Injections:  SI injection performed by Dr. Goode 7/25/2022  Effect of Injection: No relief    Back Pain  This is a chronic problem. The current episode started more than 1 year ago. The problem occurs constantly. The problem has been gradually worsening since onset. The pain is present in the lumbar spine (left). The quality of the pain is described as aching (\"deep ache that is always there\"). The pain does not radiate. The pain is at a severity of 6/10. The symptoms are aggravated by sitting (working out at the gym). Stiffness is present all day. Pertinent negatives include no " abdominal pain, chest pain, dysuria, fever, headaches, numbness or weakness. She has tried NSAIDs, heat and home exercises (stretches, PT) for the symptoms.      PEG Assessment   What number best describes your pain on average in the past week?6  What number best describes how, during the past week, pain has interfered with your enjoyment of life?6  What number best describes how, during the past week, pain has interfered with your general activity?  6      Current Outpatient Medications:   •  aspirin 81 MG tablet, Take 81 mg by mouth daily. oral delayed release tablet, Disp: , Rfl:   •  cholecalciferol (VITAMIN D3) 25 MCG (1000 UT) tablet, Take 5,000 Units by mouth Daily., Disp: , Rfl:   •  coenzyme Q10 100 MG capsule, Take 100 mg by mouth Daily., Disp: , Rfl:   •  flecainide (TAMBOCOR) 50 MG tablet, Take 1 tablet by mouth 3 (Three) Times a Day., Disp: 90 tablet, Rfl: 6  •  multivitamin with minerals tablet tablet, Take 1 tablet by mouth Daily., Disp: , Rfl:   •  omeprazole (priLOSEC) 40 MG capsule, , Disp: , Rfl:   •  pantoprazole (PROTONIX) 40 MG EC tablet, TAKE ONE TABLET BY MOUTH DAILY, Disp: 30 tablet, Rfl: 5  •  Probiotic Product (PROBIOTIC DAILY PO), Take 1 tablet by mouth Daily., Disp: , Rfl:   •  rosuvastatin (CRESTOR) 20 MG tablet, Take 20 mg by mouth Daily., Disp: , Rfl:   •  vitamin C (ASCORBIC ACID) 500 MG tablet, Take 500 mg by mouth Daily., Disp: , Rfl:   •  Wheat Dextrin (BENEFIBER DRINK MIX PO), Take 1 bottle by mouth Daily., Disp: , Rfl:   •  Zinc 50 MG capsule, Take 50 mg by mouth Daily., Disp: , Rfl:     The following portions of the patient's history were reviewed and updated as appropriate: allergies, current medications, past family history, past medical history, past social history, past surgical history and problem list.      REVIEW OF PERTINENT MEDICAL DATA    Office visit from 8/24/2022 with Dr. Goode reviewed.  Patient seen in follow-up for low back/sacroiliac pain.  MRI shows  "degenerative disc disease.  Recommend pain management.  Continue PT for lumbar pelvic stability control.    Office visit from 6/22/2022 with Dr. Celestin reviewed.  Patient follows up with \"left hip pain\".  It was felt that this was sacroiliac joint pain and suggested PT and consideration of sports medicine.  AP of the hips show good joint spaces in her hips with some narrowing in her sacroiliac joint (left greater than right) as well as mild degenerative changes in the lower part of her lumbar spine.  Refer to sports medicine for SI joint injection, if this fails to improve consider MRI of the back refer to back specialist.    MRI EXAMINATION LUMBAR SPINE WITHOUT CONTRAST--8/12/2022     HISTORY: Back pain, left radiculopathy.     COMPARISON: None.     FINDINGS: The alignment of the lumbar spine is within normal limits. The  conus is at L1 and the caudal aspect of the spinal cord appears normal.     L1-L2: There is no evidence of disc bulge or herniation.     L2-L3: There is no evidence of disc bulge or herniation     L3-L4: Mild facet degenerative disease is present bilaterally.     L4-L5: Mild facet degenerative disease is present bilaterally. There is  no evidence of disc bulge or herniation.     L5-S1: Mild-to-moderate facet degenerative disease is present  bilaterally. There is no evidence of disc bulge or herniation a small  hemangioma is appreciated involving the superior aspect of the S1  vertebral body to the left. There is an area of signal loss involving  the S1 vertebral body anteriorly and to the left measuring approximately  7 mm in size. An area of sclerosis was present at this location on the  CT examination of the abdomen and pelvis performed on 03/02/2019. The  appearance is consistent with a bone island.     IMPRESSION:  There is no evidence of a disc herniation. Multilevel  degenerative disease is noted as described above most prominent of which  is at L5-S1 where there is mild to moderate facet " "degenerative disease.  See above.     This report was finalized on 8/12/2022 4:49 PM by Dr. Basim Espinosa M.D.    --  The aforementioned information the Chief Complaint section and above subjective data including any HPI data, and also the Review of Systems data, has been personally reviewed and affirmed.  --    Review of Systems   Constitutional: Positive for activity change. Negative for fatigue and fever.   HENT: Negative for congestion.    Eyes: Negative for visual disturbance.   Respiratory: Negative for cough and chest tightness.    Cardiovascular: Negative for chest pain.   Gastrointestinal: Negative for abdominal pain, constipation and diarrhea.   Genitourinary: Negative for difficulty urinating and dysuria.   Musculoskeletal: Positive for back pain.   Neurological: Negative for dizziness, weakness, light-headedness, numbness and headaches.   Psychiatric/Behavioral: Positive for sleep disturbance. Negative for agitation and suicidal ideas. The patient is not nervous/anxious.      Vitals:    09/21/22 1012   BP: 135/78   BP Location: Left arm   Patient Position: Sitting   Pulse: 93   Resp: 12   SpO2: 97%   Weight: 60.1 kg (132 lb 6.4 oz)   Height: 157.5 cm (62.01\")   PainSc:   6   PainLoc: Back     Objective   Physical Exam  Vitals and nursing note reviewed.   Constitutional:       Appearance: Normal appearance. She is well-developed.   Eyes:      General: Lids are normal.   Cardiovascular:      Rate and Rhythm: Normal rate.   Pulmonary:      Effort: Pulmonary effort is normal.   Musculoskeletal:      Lumbar back: No tenderness or bony tenderness. Decreased range of motion. Negative right straight leg raise test and negative left straight leg raise test.      Comments:   +Lumbar facet loading, Left  -Josué Emma  -Josué SI Compression   Neurological:      Mental Status: She is alert and oriented to person, place, and time.   Psychiatric:         Attention and Perception: Attention normal.         Mood and " Affect: Mood normal.         Speech: Speech normal.         Behavior: Behavior normal.         Judgment: Judgment normal.       Assessment & Plan   Diagnoses and all orders for this visit:    1. Lumbar facet arthropathy (Primary)    2. Chronic left-sided low back pain without sciatica      --- I spent 42 minutes caring for Betty on this date of service. This time includes time spent by me in the following activities: preparing for the visit, reviewing tests, obtaining and/or reviewing a separately obtained history, performing a medically appropriate examination and/or evaluation, counseling and educating the patient/family/caregiver and documenting information in the medical record     --- Patient does state that she has no interest in medication management for her low back pain today.   --- Recommend Left L4-S1 MBB for diagnostic purposes with Dr. Reyes. If This is diagnostically positive then will repeat x 1 with goal of proceeding with RFA. Mrs. Garg would like to think about this and will let our office know if she decides to move forward with this procedure.     Reviewed the procedure at length with the patient.  Included in the review was expectations, complications, risk and benefits.The procedure was described in detail and the risks, benefits and alternatives were discussed with the patient (including but not limited to: bleeding, infection, nerve damage, worsening of pain, inability to perform injection, paralysis, seizures, coma, no pain relief and death) who agreed to proceed.  Discussed the potential for sedation if warranted/wanted.  The procedure will plan to be performed at [facility] with fluoroscopic guidance(unless ultrasound is indicated) and could potentially have steroids and contrast dye used. Questions were answered and in a way the patient could understand.  Patient verbalized understanding and wishes to proceed.  This intervention will be ordered.  Discussed with  patient that all procedures are part of a multimodal plan of care and include either formal PT or a home exercise program.  Patient has no evidence of coagulopathy or current infection.    --- Follow-up after procedure     Dictated utilizing Dragon dictation.     This document is intended for medical expert use only. Reading of this document by patients and/or patient's family without participating medical staff guidance may result in misinterpretation and unintended morbidity.   Any interpretation of such data is the responsibility of the patient and/or family member responsible for the patient in concert with their primary or specialist providers, not to be left for sources of online searches such as [employer], [employer] or similar queries. Relying on these approaches to knowledge may result in misinterpretation, misguided goals of care and even death should patients or family members try recommendations outside of the realm of professional medical care in a supervised way.

## 2022-09-21 NOTE — TELEPHONE ENCOUNTER
Caller: Betty Garg    Relationship: Self    Best call back number:     What is the best time to reach you: ANY     Who are you requesting to speak with (clinical staff, provider,  specific staff member): CLINICAL    What was the call regarding: PATIENT WOULD LIKE A GOOD RECOMMENDATION FOR A CHIROPRACTOR    Do you require a callback: YES

## 2022-09-22 NOTE — TELEPHONE ENCOUNTER
I called the patient and M for patient with  information and office number. No further action needed at this time.    -SUJATA Barajas

## 2022-10-26 ENCOUNTER — OFFICE VISIT (OUTPATIENT)
Dept: CARDIOLOGY | Facility: CLINIC | Age: 52
End: 2022-10-26

## 2022-10-26 VITALS
WEIGHT: 133.6 LBS | HEIGHT: 64 IN | HEART RATE: 96 BPM | SYSTOLIC BLOOD PRESSURE: 110 MMHG | DIASTOLIC BLOOD PRESSURE: 70 MMHG | BODY MASS INDEX: 22.81 KG/M2 | OXYGEN SATURATION: 99 %

## 2022-10-26 DIAGNOSIS — I49.3 PVCS (PREMATURE VENTRICULAR CONTRACTIONS): ICD-10-CM

## 2022-10-26 DIAGNOSIS — I48.0 PAROXYSMAL ATRIAL FIBRILLATION: Primary | ICD-10-CM

## 2022-10-26 PROCEDURE — 99213 OFFICE O/P EST LOW 20 MIN: CPT | Performed by: PHYSICIAN ASSISTANT

## 2022-10-26 PROCEDURE — 93000 ELECTROCARDIOGRAM COMPLETE: CPT | Performed by: PHYSICIAN ASSISTANT

## 2022-10-26 RX ORDER — DAPSONE 75 MG/G
GEL TOPICAL AS NEEDED
COMMUNITY
Start: 2022-08-03

## 2022-10-26 RX ORDER — SODIUM FLUORIDE1.1%, POTASSIUM NITRATE 5% 5.8; 57.5 MG/ML; MG/ML
GEL, DENTIFRICE DENTAL
COMMUNITY
Start: 2022-07-19

## 2022-10-26 NOTE — PROGRESS NOTES
Betty Garg  1970  [phone]    10/26/2022    [facility]     Referring Provider: No ref. provider found     Job Jackson MD  [id number]  [facility] [address]    Chief Complaint   Patient presents with   • Paroxysmal atrial fibrillation          Problem List:      1. Paroxysmal Atrial Fibrillation:   a. CHADSVasc = 0   b. 2 episodes of atrial fibrillation 8/2011 and 12/2012 with spontaneous conversion to normal sinus rhythm  c. Prn Rythmol  d. Normal echocardiogram 3/24/2016 with normal LVEF and no significant valvular heart disease  e. Normal GXT 7/28/2018 with heart rate reached at 189 bpm  f. Calcium artery score of 19.9 7/23/2018  g. 12/20/2020 ER 30 days No AF, < 1% PAC/PVC  2. Paroxysmal SVT/PVCs  a. 24 Hour Holter Monitor 7/31/19: NSR  bpm, average 80 bpm. Occasional PVCs. AT   3. Chest Pain Syndrome  a. Mild CAD calcification on CAC scan   b. Reported normal stress test 2018- data deficit  4. GERD  5. Anxiety      Allergies  Allergies   Allergen Reactions   • Penicillins Hives       Current Medications    Current Outpatient Medications:   •  aspirin 81 MG tablet, Take 81 mg by mouth daily. oral delayed release tablet, Disp: , Rfl:   •  cholecalciferol (VITAMIN D3) 25 MCG (1000 UT) tablet, Take 5,000 Units by mouth Daily., Disp: , Rfl:   •  coenzyme Q10 100 MG capsule, Take 100 mg by mouth Daily., Disp: , Rfl:   •  Dapsone 7.5 % gel, As Needed., Disp: , Rfl:   •  flecainide (TAMBOCOR) 50 MG tablet, Take 1 tablet by mouth 3 (Three) Times a Day., Disp: 90 tablet, Rfl: 6  •  multivitamin with minerals tablet tablet, Take 1 tablet by mouth Daily., Disp: , Rfl:   •  omeprazole (priLOSEC) 40 MG capsule, , Disp: , Rfl:   •  pantoprazole (PROTONIX) 40 MG EC tablet, TAKE ONE TABLET BY MOUTH DAILY, Disp: 30 tablet, Rfl: 5  •  Probiotic Product (PROBIOTIC DAILY PO), Take 1 tablet by mouth Daily., Disp: , Rfl:   •  rosuvastatin (CRESTOR) 20 MG  "tablet, Take 20 mg by mouth Daily., Disp: , Rfl:   •  Sodium Fluoride 5000 Sensitive 1.1-5 % gel, , Disp: , Rfl:   •  vitamin C (ASCORBIC ACID) 500 MG tablet, Take 500 mg by mouth Daily., Disp: , Rfl:   •  Wheat Dextrin (BENEFIBER DRINK MIX PO), Take 1 bottle by mouth Daily., Disp: , Rfl:   •  Zinc 50 MG capsule, Take 50 mg by mouth Daily., Disp: , Rfl:     History of Present Illness:      Pt presents for follow up of PAF, PVCs. Since we last saw the pt, pt denies any AF/PVCs. She is very familiar with her atrial fibrillation and denies any episodes for awhile. She denies SOB, CP, LH, and dizziness, syncope. Denies any hospitalizations, ER visits, bleeding issues ASA, or TIA/CVA symptoms. Overall feels well. BP is never an issue. She does cardio three times per week, and weights twice per week. She had COVID a few months ago and had more PVCs at that time but have settled down.     ROS:  General:  Denies fatigue, weight gain or loss  Cardiovascular:  Denies CP, PND, syncope, near syncope, edema or palpitations.  Pulmonary:  Denies GOODWIN, cough, or wheezing      Vitals:    10/26/22 1120   BP: 110/70   BP Location: Right arm   Patient Position: Sitting   Pulse: 96   SpO2: 99%   Weight: 60.6 kg (133 lb 9.6 oz)   Height: 161.3 cm (63.5\")     Body mass index is 23.29 kg/m².  PE:  General: NAD. A & O x 3   Neck: no JVD, no carotid bruits, no TM  Heart RRR, NL S1, S2, S4 present, no rubs, murmurs  Lungs: CTA, no wheezes, rhonchi, or rales  Abd: soft, non-tender, NL BS  Ext: No musculoskeletal deformities, no edema, cyanosis, or clubbing  Psych: normal mood and affect    Diagnostic Data:         ECG 12 Lead    Date/Time: 10/26/2022 11:53 AM  Performed by: Mabel Varela PA  Authorized by: Mabel Varela PA   Comparison: compared with previous ECG   Rhythm: sinus rhythm  BPM: 96              1. Paroxysmal atrial fibrillation (HCC)    2. PVCs (premature ventricular contractions)          Plan:    1) PAF: no recurrence on " flecainide. QRS stable  Continue present medications.   2) PVC: resolved on meds       F/up in 12 months    Electronically signed by KARL Pfeiffer, 10/26/22, 11:56 AM EDT.

## 2022-11-17 ENCOUNTER — OFFICE VISIT (OUTPATIENT)
Dept: FAMILY MEDICINE CLINIC | Facility: CLINIC | Age: 52
End: 2022-11-17

## 2022-11-17 VITALS
SYSTOLIC BLOOD PRESSURE: 144 MMHG | HEART RATE: 103 BPM | WEIGHT: 134 LBS | OXYGEN SATURATION: 100 % | DIASTOLIC BLOOD PRESSURE: 92 MMHG | BODY MASS INDEX: 22.88 KG/M2 | HEIGHT: 64 IN

## 2022-11-17 DIAGNOSIS — R22.42 MASS OF LEFT THIGH: Primary | ICD-10-CM

## 2022-11-17 PROBLEM — L65.9 HAIR LOSS: Status: ACTIVE | Noted: 2022-09-01

## 2022-11-17 PROBLEM — N95.1 VASOMOTOR SYMPTOMS DUE TO MENOPAUSE: Status: ACTIVE | Noted: 2022-05-02

## 2022-11-17 PROBLEM — G47.00 INSOMNIA: Status: ACTIVE | Noted: 2022-05-02

## 2022-11-17 PROBLEM — N92.0 MENORRHAGIA: Status: ACTIVE | Noted: 2022-05-02

## 2022-11-17 PROBLEM — I49.3 VENTRICULAR PREMATURE BEATS: Status: ACTIVE | Noted: 2019-08-06

## 2022-11-17 PROBLEM — Z01.419 WELL WOMAN EXAM WITH ROUTINE GYNECOLOGICAL EXAM: Status: ACTIVE | Noted: 2022-05-01

## 2022-11-17 PROBLEM — N39.3 STRESS INCONTINENCE: Status: ACTIVE | Noted: 2022-05-02

## 2022-11-17 PROBLEM — E55.9 VITAMIN D DEFICIENCY: Status: ACTIVE | Noted: 2022-05-02

## 2022-11-17 PROCEDURE — 99213 OFFICE O/P EST LOW 20 MIN: CPT | Performed by: NURSE PRACTITIONER

## 2022-11-17 RX ORDER — FLECAINIDE ACETATE 50 MG/1
TABLET ORAL
Qty: 90 TABLET | Refills: 6 | Status: SHIPPED | OUTPATIENT
Start: 2022-11-17

## 2022-11-17 NOTE — PROGRESS NOTES
Subjective   Betty Garg is a 52 y.o. female.     History of Present Illness   Dr Jackson patient. New to this provider.   Here with concerns for bumps under skin surface of L thigh    Two chronic bumps under her L thigh.  Patient reports she has had these bumps under her thigh surface for greater than 4 years.  She worries that these could potentially be dangerous and would like evaluation today.  She does have history of fatty lipoma elsewhere in her body, noted with her last colonoscopy.      Bp is elevated today, she has been stressed and rushing all day. Drank a big coffee before appt. she denies chest pain, palpitations, headache or vision changes      The following portions of the patient's history were reviewed and updated as appropriate: allergies, current medications, past family history, past medical history, past social history, past surgical history and problem list.    Review of Systems    Objective   Physical Exam  Vitals reviewed.   Cardiovascular:      Rate and Rhythm: Normal rate and regular rhythm.      Pulses: Normal pulses.      Heart sounds: Normal heart sounds.   Pulmonary:      Effort: Pulmonary effort is normal.      Breath sounds: Normal breath sounds.   Musculoskeletal:         General: Normal range of motion.   Skin:     General: Skin is warm and dry.      Findings: Lesion present.             Comments: 2 small, mobile approx 1 cm masses palpable under skin surface of Left thigh, no erythema, no redness, not visible to the eye    Neurological:      Mental Status: She is alert.         Vitals:    11/17/22 1605   BP: 144/92   Pulse: 103   SpO2: 100%     Body mass index is 23.36 kg/m².    Procedures    Assessment & Plan   Problems Addressed this Visit    None  Visit Diagnoses     Mass of left thigh    -  Primary      Diagnoses       Codes Comments    Mass of left thigh    -  Primary ICD-10-CM: R22.42  ICD-9-CM: 782.2         Mass of left thigh-likely fatty lipoma, patient has had  history of fatty lipoma elsewhere.  She sees dermatology.  Advised patient to have dermatologist shaggy.  If you develop night sweats, fever, these areas become red, swollen or painful notify PCP    Elevated blood pressure-drink water, walk, avoid caffeine and stressors.   has blood pressure cuff, check 2 times a week until consistently less than 130/80     Education provided in AVS   No follow-ups on file.

## 2022-12-13 ENCOUNTER — OFFICE VISIT (OUTPATIENT)
Dept: FAMILY MEDICINE CLINIC | Facility: CLINIC | Age: 52
End: 2022-12-13

## 2022-12-13 VITALS
DIASTOLIC BLOOD PRESSURE: 88 MMHG | BODY MASS INDEX: 22.2 KG/M2 | OXYGEN SATURATION: 99 % | HEIGHT: 64 IN | HEART RATE: 90 BPM | WEIGHT: 130 LBS | SYSTOLIC BLOOD PRESSURE: 138 MMHG

## 2022-12-13 DIAGNOSIS — R05.8 POST-VIRAL COUGH SYNDROME: Primary | ICD-10-CM

## 2022-12-13 DIAGNOSIS — R22.42 MASS OF LEFT THIGH: ICD-10-CM

## 2022-12-13 DIAGNOSIS — R06.2 NOCTURNAL COUGH WITH WHEEZE: ICD-10-CM

## 2022-12-13 DIAGNOSIS — R05.8 NOCTURNAL COUGH WITH WHEEZE: ICD-10-CM

## 2022-12-13 PROCEDURE — 99213 OFFICE O/P EST LOW 20 MIN: CPT | Performed by: NURSE PRACTITIONER

## 2022-12-13 RX ORDER — ALBUTEROL SULFATE 90 UG/1
2 AEROSOL, METERED RESPIRATORY (INHALATION) EVERY 4 HOURS PRN
Qty: 18 G | Refills: 0 | Status: SHIPPED | OUTPATIENT
Start: 2022-12-13 | End: 2023-01-12

## 2022-12-13 RX ORDER — CETIRIZINE HYDROCHLORIDE 10 MG/1
10 TABLET ORAL DAILY
Qty: 30 TABLET | Refills: 0 | Status: SHIPPED | OUTPATIENT
Start: 2022-12-13 | End: 2023-01-10

## 2022-12-13 NOTE — PROGRESS NOTES
Subjective   Betty Garg is a 52 y.o. female.     History of Present Illness   Dr Jackson patient. Acute visit    Post influenza cough. Dx with influenza A last week. Took tamiflu. She has some wheezing and SOA after laughing and coughing. She has used albuterol in the past. She has inhaler at home. She has tessalon at home from last covid infection.     She would like to follow up regarding chronic bumps on outside of Left thigh. H.o lipomas. Pt has not made derm appt yet. Would like ultrasound.     The following portions of the patient's history were reviewed and updated as appropriate: allergies, current medications, past family history, past medical history, past social history, past surgical history and problem list.    Review of Systems    Objective   Physical Exam  Vitals reviewed.   Constitutional:       Appearance: Normal appearance.   HENT:      Head: Normocephalic.      Right Ear: Tympanic membrane normal.      Left Ear: Tympanic membrane normal.      Nose: Nose normal.      Mouth/Throat:      Mouth: Mucous membranes are moist.   Eyes:      Pupils: Pupils are equal, round, and reactive to light.   Cardiovascular:      Rate and Rhythm: Normal rate and regular rhythm.      Pulses: Normal pulses.      Heart sounds: Normal heart sounds.   Pulmonary:      Effort: Pulmonary effort is normal.      Breath sounds: Wheezing (mild expiratory wheezes) present.   Abdominal:      General: Bowel sounds are normal.   Skin:     General: Skin is warm and dry.      Findings: Lesion present.             Comments: 3 small scattered oval masses under skin surface   Neurological:      General: No focal deficit present.      Mental Status: She is alert.         Vitals:    12/13/22 0834   BP: 138/88   Pulse: 90   SpO2: 99%     Body mass index is 22.67 kg/m².    Procedures    Assessment & Plan   Problems Addressed this Visit    None  Visit Diagnoses     Post-viral cough syndrome    -  Primary    Nocturnal cough with wheeze         Mass of left thigh        Relevant Orders    Duplex Venous Lower Extremity - Left CAR      Diagnoses       Codes Comments    Post-viral cough syndrome    -  Primary ICD-10-CM: R05.8  ICD-9-CM: 786.2     Nocturnal cough with wheeze     ICD-10-CM: R05.8, R06.2  ICD-9-CM: 786.2, 786.07     Mass of left thigh     ICD-10-CM: R22.42  ICD-9-CM: 782.2         Post viral cough/wheeze- refill albuterol 2 puff q4 h prn , limit triggers, zyretc 10 qd, nasal saline spray    Thigh mass- order u/s, advise pt to see derm     Education provided in AVS   No follow-ups on file.

## 2022-12-16 ENCOUNTER — APPOINTMENT (OUTPATIENT)
Dept: ULTRASOUND IMAGING | Facility: HOSPITAL | Age: 52
End: 2022-12-16

## 2022-12-16 ENCOUNTER — HOSPITAL ENCOUNTER (OUTPATIENT)
Dept: CARDIOLOGY | Facility: HOSPITAL | Age: 52
Discharge: HOME | End: 2022-12-16

## 2022-12-16 DIAGNOSIS — R22.42 MASS OF LEFT THIGH: Primary | ICD-10-CM

## 2022-12-16 DIAGNOSIS — R22.42 MASS OF LEFT THIGH: ICD-10-CM

## 2022-12-16 LAB
MAXIMAL PREDICTED HEART RATE: 169 BPM
STRESS TARGET HR: 144 BPM

## 2022-12-19 ENCOUNTER — HOSPITAL ENCOUNTER (OUTPATIENT)
Dept: ULTRASOUND IMAGING | Facility: HOSPITAL | Age: 52
Discharge: HOME | End: 2022-12-19
Admitting: FAMILY MEDICINE

## 2022-12-19 DIAGNOSIS — R22.42 MASS OF LEFT THIGH: ICD-10-CM

## 2022-12-19 PROCEDURE — 76882 US LMTD JT/FCL EVL NVASC XTR: CPT

## 2023-01-04 ENCOUNTER — TELEPHONE (OUTPATIENT)
Dept: FAMILY MEDICINE CLINIC | Facility: CLINIC | Age: 53
End: 2023-01-04

## 2023-01-04 NOTE — TELEPHONE ENCOUNTER
Caller: Betty Garg    Relationship: Self    Best call back number: [phone]    What orders are you requesting (i.e. lab or imaging): AUTO IMMUNE, THYROID    In what timeframe would the patient need to come in: 01.06.23    Where will you receive your lab/imaging services: IN OFFICE    Additional notes: PLEASE CALL TO LET PATIENT KNOW IF THESE TEST CAN BE ORDERED.

## 2023-01-06 DIAGNOSIS — Z79.899 HIGH RISK MEDICATION USE: ICD-10-CM

## 2023-01-06 DIAGNOSIS — E78.2 MIXED HYPERLIPIDEMIA: Primary | ICD-10-CM

## 2023-01-06 DIAGNOSIS — Z13.1 DIABETES MELLITUS SCREENING: ICD-10-CM

## 2023-01-06 LAB
ALBUMIN SERPL-MCNC: 4.4 G/DL (ref 3.5–5.2)
ALBUMIN/GLOB SERPL: 2 G/DL
ALP SERPL-CCNC: 57 U/L (ref 39–117)
ALT SERPL-CCNC: 17 U/L (ref 1–33)
AST SERPL-CCNC: 19 U/L (ref 1–32)
BILIRUB SERPL-MCNC: 0.4 MG/DL (ref 0–1.2)
BUN SERPL-MCNC: 10 MG/DL (ref 6–20)
BUN/CREAT SERPL: 14.3 (ref 7–25)
CALCIUM SERPL-MCNC: 9.4 MG/DL (ref 8.6–10.5)
CHLORIDE SERPL-SCNC: 105 MMOL/L (ref 98–107)
CHOLEST SERPL-MCNC: 133 MG/DL (ref 0–200)
CHOLEST/HDLC SERPL: 2.11 {RATIO}
CO2 SERPL-SCNC: 26.7 MMOL/L (ref 22–29)
CREAT SERPL-MCNC: 0.7 MG/DL (ref 0.57–1)
EGFRCR SERPLBLD CKD-EPI 2021: 104.9 ML/MIN/1.73
GLOBULIN SER CALC-MCNC: 2.2 GM/DL
GLUCOSE SERPL-MCNC: 91 MG/DL (ref 65–99)
HDLC SERPL-MCNC: 63 MG/DL (ref 40–60)
LDLC SERPL CALC-MCNC: 55 MG/DL (ref 0–100)
POTASSIUM SERPL-SCNC: 4 MMOL/L (ref 3.5–5.2)
PROT SERPL-MCNC: 6.6 G/DL (ref 6–8.5)
SODIUM SERPL-SCNC: 140 MMOL/L (ref 136–145)
TRIGL SERPL-MCNC: 75 MG/DL (ref 0–150)
VLDLC SERPL CALC-MCNC: 15 MG/DL (ref 5–40)

## 2023-01-07 LAB
BASOPHILS # BLD AUTO: 0.03 10*3/MM3 (ref 0–0.2)
BASOPHILS NFR BLD AUTO: 0.5 % (ref 0–1.5)
EOSINOPHIL # BLD AUTO: 0.1 10*3/MM3 (ref 0–0.4)
EOSINOPHIL NFR BLD AUTO: 1.5 % (ref 0.3–6.2)
ERYTHROCYTE [DISTWIDTH] IN BLOOD BY AUTOMATED COUNT: 11.7 % (ref 12.3–15.4)
HCT VFR BLD AUTO: 37.5 % (ref 34–46.6)
HGB BLD-MCNC: 13.1 G/DL (ref 12–15.9)
IMM GRANULOCYTES # BLD AUTO: 0.01 10*3/MM3 (ref 0–0.05)
IMM GRANULOCYTES NFR BLD AUTO: 0.2 % (ref 0–0.5)
LYMPHOCYTES # BLD AUTO: 2.06 10*3/MM3 (ref 0.7–3.1)
LYMPHOCYTES NFR BLD AUTO: 31.5 % (ref 19.6–45.3)
MCH RBC QN AUTO: 32.3 PG (ref 26.6–33)
MCHC RBC AUTO-ENTMCNC: 34.9 G/DL (ref 31.5–35.7)
MCV RBC AUTO: 92.4 FL (ref 79–97)
MONOCYTES # BLD AUTO: 0.65 10*3/MM3 (ref 0.1–0.9)
MONOCYTES NFR BLD AUTO: 10 % (ref 5–12)
NEUTROPHILS # BLD AUTO: 3.68 10*3/MM3 (ref 1.7–7)
NEUTROPHILS NFR BLD AUTO: 56.3 % (ref 42.7–76)
NRBC BLD AUTO-RTO: 0 /100 WBC (ref 0–0.2)
PLATELET # BLD AUTO: 252 10*3/MM3 (ref 140–450)
RBC # BLD AUTO: 4.06 10*6/MM3 (ref 3.77–5.28)
WBC # BLD AUTO: 6.53 10*3/MM3 (ref 3.4–10.8)

## 2023-01-10 ENCOUNTER — OFFICE VISIT (OUTPATIENT)
Dept: FAMILY MEDICINE CLINIC | Facility: CLINIC | Age: 53
End: 2023-01-10
Payer: COMMERCIAL

## 2023-01-10 VITALS
DIASTOLIC BLOOD PRESSURE: 72 MMHG | RESPIRATION RATE: 16 BRPM | OXYGEN SATURATION: 100 % | HEIGHT: 64 IN | BODY MASS INDEX: 22.53 KG/M2 | WEIGHT: 132 LBS | SYSTOLIC BLOOD PRESSURE: 114 MMHG | HEART RATE: 104 BPM

## 2023-01-10 DIAGNOSIS — M54.50 CHRONIC LEFT-SIDED LOW BACK PAIN WITHOUT SCIATICA: ICD-10-CM

## 2023-01-10 DIAGNOSIS — E78.2 MIXED HYPERLIPIDEMIA: ICD-10-CM

## 2023-01-10 DIAGNOSIS — Z00.00 ROUTINE GENERAL MEDICAL EXAMINATION AT A HEALTH CARE FACILITY: Primary | ICD-10-CM

## 2023-01-10 DIAGNOSIS — G89.29 CHRONIC LEFT-SIDED LOW BACK PAIN WITHOUT SCIATICA: ICD-10-CM

## 2023-01-10 DIAGNOSIS — M25.59 PAIN IN OTHER JOINT: ICD-10-CM

## 2023-01-10 DIAGNOSIS — I25.10 CALCIFIC CORONARY ARTERIOSCLEROSIS: ICD-10-CM

## 2023-01-10 DIAGNOSIS — I48.0 PAROXYSMAL ATRIAL FIBRILLATION: ICD-10-CM

## 2023-01-10 DIAGNOSIS — M79.10 MYALGIA: ICD-10-CM

## 2023-01-10 PROCEDURE — 99396 PREV VISIT EST AGE 40-64: CPT | Performed by: FAMILY MEDICINE

## 2023-01-10 PROCEDURE — 99213 OFFICE O/P EST LOW 20 MIN: CPT | Performed by: FAMILY MEDICINE

## 2023-01-10 NOTE — PROGRESS NOTES
Preventive Exam    History of Present Illness: Betty is here for check up and review of routine health maintenance. And we addressed the following concerns:    She notes she has not had a perioad in several months, and wonders if this is having an impact on her muscle and joint pain.  She is also wondering if there is something else causing this constant pain.  She has pain in left hip and upper back. She states she has a lot of muscle or joint pain in several other areas of her upper and lower body but states that this left hip pain has been the worst.  She has been evaluated by pain management and by neurology and there is best suggestion is treatment of the pain.  However, she would like to pursue other reasons for this pain to exist.  She notes that her mother had rheumatoid arthritis and complained of many of the things that Malaika feels are problems for her today.  She would like to be evaluated for RA.  She notes that she has pain in multiple joints.  Malaika also has chronic paroxysmal atrial fibrillation and is followed by cardiology and seems to be doing very well.  She was started on statins after having a calcium scoring test done on her arteries.      Pap Smear:2022 Reported as normal  Mammogram:2022  Reported as normal  Colon cancer screenin  Dr Esparza  STI screening:NI  Tobacco use :Never  Bone Density:NI      REVIEW OF SYSTEMS  Constitutional: Negative.    HENT: Negative.    Eyes: Negative.    Respiratory: Negative.    Cardiovascular: Negative.    Gastrointestinal: Negative.    Endocrine: Negative.    Genitourinary: Negative.    Musculoskeletal: Multiple joint and muscle aches  Skin: Negative.    Allergic/Immunologic: Negative.    Neurological: Negative.    Hematological: Negative.    Psychiatric/Behavioral: Negative.    I have reviewed the ROS as documented by the MA. Job Jackson MD       PHYSICAL EXAM    Vitals:    01/10/23 0914   BP: 114/72   Pulse: 104   Resp: 16   SpO2: 100%    Weight: 59.9 kg (132 lb)   Height: 161.3 cm (63.5\")     GENERAL: alert and oriented, afebrile and vital signs stable  HEENT: oral mucosa moist, PEERLA, EOM, conjunctiva normal  No cervical adenopathy  LUNGS: clear to ascultation bilaterally, no rales, ronchi or wheezing  HEART: RRR S1 S2 without murmers, thrills, rubs or gallops  CHEST WALL: within normal limits, no tenderness  ABDOMEN: WNL. Normal BS.  EXTREMITIES: No clubbing, cyanosis or edema noted. Normal Pulses.  SKIN: warm, dry, no rashes noted  NEURO: CN II- XII grossly intact  PSYCH: Good mood and positive affect.    ASSESSMENT AND PLAN    Problem List Items Addressed This Visit        Cardiac and Vasculature    Paroxysmal atrial fibrillation (HCC)    Overview     He is followed by cardiology and is well controlled.  She is not on a blood thinner.         Calcific coronary arteriosclerosis    Mixed hyperlipidemia    Overview     She was started on a statin drug, Crestor 20 mg a day, after the results of a cardiac calcium scoring test.            Musculoskeletal and Injuries    Chronic left-sided low back pain without sciatica  She has been working with physical therapy will continue to try to work with this without taking medication.   Other Visit Diagnoses     Routine general medical examination at a health care facility    -  Primary      Pain in other joint        Relevant Orders    Rheumatoid Factor    Myalgia        Relevant Orders    Rheumatoid Factor        Routine health maintenance reviewed and discussed with Betty.  Labs reviewed with Malaika and on the chart  Orders Placed This Encounter   Procedures   • Rheumatoid Factor      Return in about 1 year (around 1/10/2024) for Annual physical.

## 2023-01-10 NOTE — PATIENT INSTRUCTIONS
Your Annual Physical  Personal Prevention Plan      Date of Office Visit:    Encounter Provider:  Job Jackson MD  Place of Service:  [facility]  Patient Name: Betty Garg  :  1970    As part of the Annual Physical portion of your visit today, we are providing you with this personalized preventive plan of services (PPPS). This plan is based upon recommendations of the [place] Preventive Services Task Force (USPSTF) and the Advisory Committee on Immunization Practices (ACIP).    This lists the preventive care services that should be considered, and provides dates of when you are due. Items listed as completed are up-to-date and do not require any further intervention.    Health Maintenance   Topic Date Due    ZOSTER VACCINE (1 of 2) 01/10/2023 (Originally 2021)    COVID-19 Vaccine (3 - Booster for Moderna series) 2023 (Originally 6/10/2021)    PAP SMEAR  2023    LIPID PANEL  2024    ANNUAL PHYSICAL  01/10/2024    MAMMOGRAM  2024    TDAP/TD VACCINES (3 - Td or Tdap) 2028    COLORECTAL CANCER SCREENING  2029    HEPATITIS C SCREENING  Completed    INFLUENZA VACCINE  Completed    Pneumococcal Vaccine 0-64  Aged Out       Orders Placed This Encounter   Procedures    Ambulatory Referral to Rheumatology     Referral Priority:   Routine     Referral Type:   Consultation     Referral Reason:   Specialty Services Required     Requested Specialty:   Rheumatology     Number of Visits Requested:   1       Return in about 1 year (around 1/10/2024) for Annual physical.

## 2023-01-11 LAB — RHEUMATOID FACT SERPL-ACNC: 11.2 IU/ML

## 2023-02-24 ENCOUNTER — OFFICE VISIT (OUTPATIENT)
Dept: SPORTS MEDICINE | Facility: CLINIC | Age: 53
End: 2023-02-24
Payer: COMMERCIAL

## 2023-02-24 VITALS
HEART RATE: 86 BPM | TEMPERATURE: 98 F | SYSTOLIC BLOOD PRESSURE: 122 MMHG | OXYGEN SATURATION: 98 % | HEIGHT: 64 IN | WEIGHT: 134 LBS | DIASTOLIC BLOOD PRESSURE: 70 MMHG | BODY MASS INDEX: 22.88 KG/M2

## 2023-02-24 DIAGNOSIS — S46.811A TRAPEZIUS STRAIN, RIGHT, INITIAL ENCOUNTER: ICD-10-CM

## 2023-02-24 DIAGNOSIS — M25.511 TRIGGER POINT OF SHOULDER REGION, RIGHT: Primary | ICD-10-CM

## 2023-02-24 PROCEDURE — 99213 OFFICE O/P EST LOW 20 MIN: CPT | Performed by: FAMILY MEDICINE

## 2023-02-24 NOTE — PROGRESS NOTES
"Betty is a 52 y.o. year old female     Chief Complaint   Patient presents with   • Back Pain     UPPER BACK- wants to know if its related to degenerative disk disease       History of Present Illness  HPI     Here today for left upper back pain.  This has been present for several months, describes as tightness.  It seemed to worsen after manipulation by her chiropractor.  She describes pain is worse with certain positions or use of the shoulders.  She has some concerns could be correlating to the degenerative changes in her lower lumbar spine.    Review of Systems    /70 (BP Location: Left arm, Patient Position: Sitting, Cuff Size: Adult)   Pulse 86   Temp 98 °F (36.7 °C) (Temporal)   Ht 161.3 cm (63.5\")   Wt 60.8 kg (134 lb)   SpO2 98%   BMI 23.36 kg/m²      Physical Exam    Vital signs reviewed.   General: No acute distress.      MSK Exam:  Ortho Exam  Thoracic spine: There is no midline tenderness nor midline description of pain.  There is tenderness in the upper rhomboids, levator scapulae, trapezius.  There is normal scapulothoracic motion.  There is no pain with rib manipulation.      Diagnoses and all orders for this visit:    Trigger point of shoulder region, right  -     Ambulatory Referral to Physical Therapy Evaluate and treat    Trapezius strain, right, initial encounter  -     Ambulatory Referral to Physical Therapy Evaluate and treat      Overall I believe this is more muscular in the scapular stabilizer muscles.  She certainly could have some rib dysfunction but that seems less likely today.  Discussed management with physical therapy strategies and continued follow-up.    EMR Dragon/Transcription disclaimer:    Much of this encounter note is an electronic transcription/translation of spoken language to printed text.  The electronic translation of spoken language may permit erroneous, or at times, nonsensical words or phrases to be inadvertently transcribed.  Although I have reviewed the " note for such errors some may still exist.

## 2023-03-16 ENCOUNTER — DOCUMENTATION (OUTPATIENT)
Dept: PHYSICAL THERAPY | Facility: CLINIC | Age: 53
End: 2023-03-16
Payer: COMMERCIAL

## 2023-04-14 ENCOUNTER — TELEPHONE (OUTPATIENT)
Dept: ORTHOPEDIC SURGERY | Facility: CLINIC | Age: 53
End: 2023-04-14

## 2023-04-14 NOTE — TELEPHONE ENCOUNTER
Caller: Betty Garg    Relationship to patient: Self    Best call back number: [phone]    Patient is needing: PATIENT IS EXPERIENCING LEFT HIP PAIN WHICH DR. NIEVES HAS TREATED PREVIOUSLY. SHE IS ALSO HAVING LEFT SHOULDER/UPPER BACK PAIN, WHICH  DOES NOT TREAT FOR. WANTING TO KNOW IF SHE SHOULD SCHEDULE WITH DR. NIEVES OR SHOULD SHE SWITCH TO A DIFFERENT PROVIDER IN THE OFFICE. PLEASE CALL BACK TO ADVISE

## 2023-04-21 ENCOUNTER — OFFICE VISIT (OUTPATIENT)
Dept: ORTHOPEDIC SURGERY | Facility: CLINIC | Age: 53
End: 2023-04-21
Payer: COMMERCIAL

## 2023-04-21 VITALS — BODY MASS INDEX: 23.28 KG/M2 | WEIGHT: 131.4 LBS | HEIGHT: 63 IN | TEMPERATURE: 95.1 F

## 2023-04-21 DIAGNOSIS — R52 PAIN: ICD-10-CM

## 2023-04-21 DIAGNOSIS — M54.2 NECK PAIN: Primary | ICD-10-CM

## 2023-04-21 DIAGNOSIS — M51.36 DDD (DEGENERATIVE DISC DISEASE), LUMBAR: ICD-10-CM

## 2023-04-21 DIAGNOSIS — M54.6 ACUTE LEFT-SIDED THORACIC BACK PAIN: ICD-10-CM

## 2023-04-21 RX ORDER — MELOXICAM 7.5 MG/1
TABLET ORAL
COMMUNITY
Start: 2023-01-31

## 2023-04-21 NOTE — PROGRESS NOTES
"Patient Name: Betty Garg   YOB: 1970  Referring Primary Care Physician: Job Jackson MD  BMI: Body mass index is 23.28 kg/m².    Chief Complaint:    Chief Complaint   Patient presents with   • Left Hip - Follow-up, Pain        HPI:     Betty Garg is a 52 y.o. female who presents today for evaluation of   Chief Complaint   Patient presents with   • Left Hip - Follow-up, Pain   .  Patient is referred back today for \"left hip pain\" she has had trouble in her low back and pain actually centralizes around her low back.  She went to sports medicine and they injected her sacroiliac joint that did not help much.  They sent her to a pain doctor who said the only thing that she could qualify for is the patient understood it was a radiofrequency ablation.  She is having pain in her upper thoracic spine and when she rotates her neck she feels a pain that shoots down to the inner border of her left scapula.  Saw the sports medicine doctors and she said they told her it is a pulled muscle but she still continues to hurt.  She also went to chiropractic and said it did not especially help mighta made her feel little worse and she is is counterpoint with since is his back today.  She is done physical therapy as well      Subjective   Medications:   Home Medications:  Current Outpatient Medications on File Prior to Visit   Medication Sig   • aspirin 81 MG tablet Take 1 tablet by mouth Daily. oral delayed release tablet   • cholecalciferol (VITAMIN D3) 25 MCG (1000 UT) tablet Take 5 tablets by mouth Daily.   • coenzyme Q10 100 MG capsule Take 1 capsule by mouth Daily.   • Dapsone 7.5 % gel As Needed.   • flecainide (TAMBOCOR) 50 MG tablet TAKE ONE TABLET BY MOUTH THREE TIMES A DAY   • multivitamin with minerals tablet tablet Take 1 tablet by mouth Daily.   • omeprazole (priLOSEC) 40 MG capsule    • Probiotic Product (PROBIOTIC DAILY PO) Take 1 tablet by mouth Daily.   • rosuvastatin (CRESTOR) 20 " MG tablet Take 1 tablet by mouth Daily.   • Sodium Fluoride 5000 Sensitive 1.1-5 % gel    • vitamin C (ASCORBIC ACID) 500 MG tablet Take 1 tablet by mouth Daily.   • Wheat Dextrin (BENEFIBER DRINK MIX PO) Take 1 bottle by mouth Daily.   • Zinc 50 MG capsule Take 50 mg by mouth Daily.   • meloxicam (MOBIC) 7.5 MG tablet      No current facility-administered medications on file prior to visit.     Current Medications:  Scheduled Meds:  Continuous Infusions:No current facility-administered medications for this visit.    PRN Meds:.    I have reviewed the patient's medical history in detail and updated the computerized patient record.  Review and summarization of old records includes:    Past Medical History:   Diagnosis Date   • Acid reflux    • Afib    • Atrial fibrillation    • Bronchitis    • COVID     x2   • COVID-19    • Difficulty swallowing    • Diverticulitis    • History of mammogram 10/2014   • Hyperlipidemia    • Plaque in heart artery    • Skin cancer     SKIN   • Wears glasses         Past Surgical History:   Procedure Laterality Date   • CARDIAC CATHETERIZATION  2011    Normal   • COLON RESECTION     • COLONOSCOPY     • PAP SMEAR  01/2014        Social History     Occupational History   • Occupation: Sales   Tobacco Use   • Smoking status: Never   • Smokeless tobacco: Never   Vaping Use   • Vaping Use: Never used   Substance and Sexual Activity   • Alcohol use: Yes     Alcohol/week: 3.0 standard drinks     Types: 2 Cans of beer, 1 Drinks containing 0.5 oz of alcohol per week     Comment: 3/week   • Drug use: No   • Sexual activity: Defer     Partners: Male     Birth control/protection: Vasectomy      Social History     Social History Narrative    Lives at home with  and children        Family History   Problem Relation Age of Onset   • Hypothyroidism Mother    • Heart disease Father    • Hypertension Father    • Hyperlipidemia Father    • Hyperthyroidism Maternal Grandmother    • Hypothyroidism  "Maternal Aunt    • No Known Problems Brother    • Arrhythmia Paternal Grandmother        ROS: 14 point review of systems was performed and all other systems were reviewed and are negative except for documented findings in HPI and today's encounter.     Allergies:   Allergies   Allergen Reactions   • Penicillins Hives     Constitutional:  Denies fever, shaking or chills   Eyes:  Denies change in visual acuity   HENT:  Denies nasal congestion or sore throat   Respiratory:  Denies cough or shortness of breath   Cardiovascular:  Denies chest pain or severe LE edema   GI:  Denies abdominal pain, nausea, vomiting, bloody stools or diarrhea   Musculoskeletal:  Numbness, tingling, pain, or loss of motor function only as noted above in history of present illness.  : Denies painful urination or hematuria  Integument:  Denies rash, lesion or ulceration   Neurologic:  Denies headache or focal weakness  Endocrine:  Denies lymphadenopathy  Psych:  Denies confusion or change in mental status   Hem:  Denies active bleeding    OBJECTIVE:  Physical Exam: 52 y.o. female  Wt Readings from Last 3 Encounters:   04/21/23 59.6 kg (131 lb 6.4 oz)   02/24/23 60.8 kg (134 lb)   01/10/23 59.9 kg (132 lb)     Ht Readings from Last 1 Encounters:   04/21/23 160 cm (63\")     Body mass index is 23.28 kg/m².  Vitals:    04/21/23 1018   Temp: 95.1 °F (35.1 °C)     Vital signs reviewed.     General Appearance:    Alert, cooperative, in no acute distress                  Eyes: conjunctiva clear  ENT: external ears and nose atraumatic  CV: no peripheral edema  Resp: normal respiratory effort  Skin: no rashes or wounds; normal turgor  Psych: mood and affect appropriate  Lymph: no nodes appreciated  Neuro: gross sensation intact  Vascular:  Palpable peripheral pulse in noted extremity  Musculoskeletal Extremities: Exam today shows Stinchfield's negative she has good rotation of both hips abduction is not limited she not tender over her trochanters and " optically tender over her SI joints anymore but she does have tenderness around L4-5 more often just off the median line over where the facets are posteriorly it bothers her some when she extends.  IRENE test does cause some pain in that back area as well but the SI joints are not tender to palpation.  No palpable tenderness up in the inner border left scapula but she rotates her neck she has like a cordlike pain that radiates down to that area does not really radiate down her arms seems like it might be posterior serratus type syndrome.    Radiology:   AP of the hips lateral left hip taken the office today for complaints of pain with comparison views show good joint spaces in the hip she has evidence of degenerative changes in the lower lumbosacral spine and sacroiliac joints.  A final lumbar x-rays from better than a year ago that shows slight scoliotic changes in lumbar spine with degenerative changes in the lower lumbar sacral spinal areas.  She had an MRI that was actually ordered by Dr. Goode showed no evidence of disc herniation but multilevel degenerative disease most prominent at L4-5 where there is also mild to moderate facet degenerative disease.  This is a general area she is tender in        Assessment:     ICD-10-CM ICD-9-CM   1. Neck pain  M54.2 723.1   2. Pain  R52 780.96   3. DDD (degenerative disc disease), lumbar  M51.36 722.52   4. Acute left-sided thoracic back pain  M54.6 724.1        MDM/Plan:   The diagnosis(es), natural history, pathophysiology and treatment for diagnosis(es) were discussed. Opportunity given and questions answered.  Biomechanics of pertinent body areas discussed.  When appropriate, the use of ambulatory aids discussed.    Inflammation/pain control; with cold, heat, elevation and/or liniments discussed as appropriate  SPECIALTY REFERRAL  MEDICAL RECORDS reviewed from other provider(s) for past and current medical history pertinent to this complaint.  Feeling if she may  need to trap pain management and particularly some facet injections with changes found on plain film MRI and on exam.  She could try radiofrequency ablation if she wanted to hear if they offered it etc.  Encouraged her to do home program of exercises I want her to see Carla for an evaluation of her spine may have some cervical spinal elements to this as well as lower spine just get some fresh I was looking at it from a from their point of view.    4/21/2023    Dictated utilizing Dragon dictation

## 2023-04-28 ENCOUNTER — OFFICE VISIT (OUTPATIENT)
Dept: ORTHOPEDIC SURGERY | Facility: CLINIC | Age: 53
End: 2023-04-28
Payer: COMMERCIAL

## 2023-04-28 VITALS — BODY MASS INDEX: 24.1 KG/M2 | HEIGHT: 63 IN | TEMPERATURE: 98 F | WEIGHT: 136 LBS

## 2023-04-28 DIAGNOSIS — M51.36 DDD (DEGENERATIVE DISC DISEASE), LUMBAR: ICD-10-CM

## 2023-04-28 DIAGNOSIS — M54.12 CERVICAL RADICULOPATHY: Primary | ICD-10-CM

## 2023-04-28 DIAGNOSIS — R52 PAIN: ICD-10-CM

## 2023-04-28 NOTE — PROGRESS NOTES
Patient Name: Betty Garg   YOB: 1970  Referring Primary Care Physician: Job Jackson MD      Chief Complaint:    Chief Complaint   Patient presents with   • Lumbar Spine - Pain, Initial Evaluation   • Thoracic Spine - Initial Evaluation, Pain        HPI:  Betty Garg is a 52 y.o. female who presents to [facility] for evaluation of pain referring into the left scapula.  Has secondary complaint of low back pain that has been present for 3 to 4 years.  She says pain was generally intermittent but now becoming more constant.  The pain in the scapula has been present since around November without specific injury.  She has been to therapy for at least 3 different occasions through the years with varying results.  She does remain active and continues to exercises on her own. This is an established patient of practice who recently saw Dr. Celestin and was referred for further evaluation based on her complaints.  No bowel or bladder dysfunction or saddle anesthesia.  No imbalance or incoordination.  Prior pertinent records were reviewed.    PFSH:  See attached    ROS: As per HPI, otherwise negative    Objective:      52 y.o. female  Body mass index is 24.09 kg/m²., 61.7 kg (136 lb), @HT@  Vitals:    04/28/23 1029   Temp: 98 °F (36.7 °C)         Neurologic Exam     Gait, Coordination, and Reflexes     Gait  Gait: normal    Reflexes   Right brachioradialis: 1/4  Left brachioradialis: 1/4  Right biceps: 1/4  Left biceps: 1/4  Right triceps: 1/4  Left triceps: 1/4  Right achilles: 1/4  Left achilles: 1/4  Right Bennett: absent  Left Bennett: absent     Right Hand Exam     Tests   Tinel's sign (median nerve): negative      Left Hand Exam     Tests   Tinel's sign (median nerve): negative            Spine Musculoskeletal Exam    Gait    Gait is normal.    Strength    Cervical Spine    Cervical spine motor exam is normal.    Sensory    Cervical Spine    Cervical spine  sensation is normal.    Reflexes    Right        Biceps: 1/4      Brachioradialis: 1/4      Triceps: 1/4      Quadriceps: 1/4      Achilles: 1/4       Bennett: absent    Left        Biceps: 1/4        Brachioradialis: 1/4      Triceps: 1/4      Quadriceps: 1/4      Achilles: 1/4      Bennett: absent    Special Tests    Cervical Spine      Right      Tinel's - carpal tunnel: negative      Left      Tinel's - carpal tunnel: negative        IMAGING:     Indication: pain related symptoms,  Views: 2V AP&LAT thoracic  Findings: Normal alignment, fairly well-preserved disc spaces, no fractures   Comparison views: None    Lumbar MRI 1222 at [facility] revealed facet degenerative change L3-S1 without significant canal or foraminal narrowing    Assessment:           Diagnoses and all orders for this visit:    1. Cervical radiculopathy (Primary)  -     MRI Cervical Spine Without Contrast; Future    2. DDD (degenerative disc disease), lumbar    3. Pain  -     XR Spine Thoracic 2 View             Plan:  With failure of physical therapy and chiropractic treatment, will get cervical MRI as her symptoms do seem more radicular in nature.  She had been referred to pain management to discuss facet injections and has not yet proceeded.  We will call her with the results of the cervical MRI and depending on those results, may also refer to pain management for injection therapy but she is a good candidate for LMBB/RFA as previously discussed at pain management.  Of course if there is anything of surgical concern, we will get her into [facility] neurosurgery.    Return if symptoms worsen or fail to improve.    EMR Dragon/Transcription Disclaimer:   Much of this encounter note is an electronic transcription/translation of spoken language to printed text. The electronic translation of spoken language may permit erroneous, or at times, nonsensical words or phrases to be inadvertently transcribed; Although I have reviewed the note for such  errors, some may still exist.

## 2023-05-04 ENCOUNTER — TELEPHONE (OUTPATIENT)
Dept: ORTHOPEDIC SURGERY | Facility: CLINIC | Age: 53
End: 2023-05-04
Payer: COMMERCIAL

## 2023-05-04 NOTE — TELEPHONE ENCOUNTER
MRI IMPORTED INTO [employer]   
PAST SURGICAL HISTORY:  No significant past surgical history

## 2023-05-08 ENCOUNTER — TELEPHONE (OUTPATIENT)
Dept: ORTHOPEDIC SURGERY | Facility: CLINIC | Age: 53
End: 2023-05-08
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
## 2023-05-08 NOTE — TELEPHONE ENCOUNTER
I called larisa spoke with Leopoldo the report isn't ready yet. Request has been made for ASAP results should be ready within the next 24 hours but NOT a guarantee.

## 2023-05-08 NOTE — TELEPHONE ENCOUNTER
Caller: Betty Garg    Relationship to patient: Self    Best call back number: [phone]  Patient is needing: WANTS TO KNOW IF [facility] SENT MRI REPORT AND WOULD LIKE A CALL BACK TO GO OVER THE RESULTS

## 2023-05-09 ENCOUNTER — TELEPHONE (OUTPATIENT)
Dept: ORTHOPEDIC SURGERY | Facility: CLINIC | Age: 53
End: 2023-05-09
Payer: COMMERCIAL

## 2023-05-09 DIAGNOSIS — M54.12 CERVICAL RADICULOPATHY: Primary | ICD-10-CM

## 2023-05-09 DIAGNOSIS — M54.12 CERVICAL RADICULOPATHY: ICD-10-CM

## 2023-05-09 NOTE — TELEPHONE ENCOUNTER
Spoke to patient about MRI results.  She does have facet arthropathy in the mid and lower cervical spine likely contributing to the neck pain and stiffness, also has some foraminal narrowing to the left at C6-7 likely contributing to the scapular pain.  Discussed referral to pain management and physical therapy and she is open to both.  Follow-up as needed, next option would be to refer to neurosurgery but I do not think this will lead to surgery

## 2023-06-14 ENCOUNTER — TREATMENT (OUTPATIENT)
Dept: PHYSICAL THERAPY | Facility: CLINIC | Age: 53
End: 2023-06-14
Payer: COMMERCIAL

## 2023-06-14 DIAGNOSIS — M54.42 CHRONIC LEFT-SIDED LOW BACK PAIN WITH LEFT-SIDED SCIATICA: ICD-10-CM

## 2023-06-14 DIAGNOSIS — M47.812 CERVICAL FACET JOINT SYNDROME: ICD-10-CM

## 2023-06-14 DIAGNOSIS — M54.2 PAIN, NECK: ICD-10-CM

## 2023-06-14 DIAGNOSIS — M47.817 FACET ARTHRITIS, DEGENERATIVE, L5-S1 LEVEL, LUMBOSACRAL SPINE: Primary | ICD-10-CM

## 2023-06-14 DIAGNOSIS — R53.1 WEAKNESS: ICD-10-CM

## 2023-06-14 DIAGNOSIS — R29.3 POSTURE IMBALANCE: ICD-10-CM

## 2023-06-14 DIAGNOSIS — G89.29 CHRONIC LEFT-SIDED LOW BACK PAIN WITH LEFT-SIDED SCIATICA: ICD-10-CM

## 2023-06-14 PROCEDURE — 97162 PT EVAL MOD COMPLEX 30 MIN: CPT | Performed by: PHYSICAL THERAPIST

## 2023-06-14 PROCEDURE — 97530 THERAPEUTIC ACTIVITIES: CPT | Performed by: PHYSICAL THERAPIST

## 2023-06-14 NOTE — PROGRESS NOTES
"  Physical Therapy Initial Evaluation and Plan of Care    [facility]  [address] [place] [address]  [phone] (phone)  [phone] (fax)      Patient: Betty Garg   : 1970  Diagnosis/ICD-10 Code:  Facet arthritis, degenerative, L5-S1 level, lumbosacral spine [M47.817]  Referring practitioner: MEGHA Ortiz  Date of Initial Visit: 2023  Today's Date: 2023  Patient seen for 1 sessions           Subjective Evaluation    History of Present Illness  Date of onset: 3/16/2023 (worse over past few months)  Mechanism of injury: Had PHYSICALTHERAPY 2022 for low back / hip/ SI joint  Never really resolved   Saw Dr Goode and did cortisone injection in SI no help  Back to Dr Celestin  Sent to pain management   AUG MRI of low back      There is no evidence of a disc herniation. Multilevel  degenerative disease is noted as described above most prominent of which  is at L5-S1 where there is mild to moderate facet degenerative disease.  SLEEPING can go to sleep ok but then up after 2-3 hours every night   PAIN management no injection but offere radio frequency ablation  chiropractic did not help   NO official scoliosis but slight curivature  NOW symptoms also in upper back and neck   CONSTANT pain like a really sore ache in LEFT \"wing\"  Neck motion will exacerbation pain in that area   DENIES any numbness or tingling or pain in arm   AM whole back hurts  Better after 10-15 min   Knees hurting now too worse with stairs  EXERCISES was doing boot camp in spring    Walk 3 miles 2-3x/week     Gym circuit does not do the legs any more (makes the legs hurt)  OK with arm   Takes meds to prvent afib     Subjective comment: neck and low back pain on L side   Patient Occupation: sales  sitting driving  Quality of life: excellent    Pain  Current pain ratin  At best pain rating: 3  At worst pain ratin  Quality: dull ache  Relieving factors: ice, heat " "and relaxation  Aggravating factors: prolonged positioning (SITTING)  Progression: worsening    Social Support  Lives with: spouse and young children    Diagnostic Tests  X-ray: abnormal  MRI studies: abnormal    Treatments  Previous treatment: physical therapy and injection treatment  Patient Goals  Patient goals for therapy: decreased pain, increased motion, increased strength, independence with ADLs/IADLs and return to sport/leisure activities  Patient goal: live without pain           Objective     POSTURE  Forward Head ;    Prominent CT junction;     Iliac Crests Level ;   increased kyphosis with rotation   Slight increased winging LEFT inferior angle   ROM   LUMBAR  RIGHT  LEFT  COMMENTS   FLEXION 100%    Stiffness    EXTENSION 100%       SIDE FLEXION  100%  100%    Cervical AROM lacking 20% end range to LEFT rotation and SIDE FLEXION  Otherwise WNL   MMT  MMT   LE RIGHT  LEFT  COMMENTS   HIP FLEXION 5/5  5/5    HIP ABDUCTION 4/5  4/5    QUADRICEPS 4/5  4/5 Limited by knee pain    HAMSTRING 5/5  5/5       OK heel and toe walking   TRANSFERS  Independent but compensated/antalgic  secondary to pain  SINGLE LEG STANCE   10+sec      LEFT feeling \"shaky\"    RIGHT with noted trunk SIDE FLEXION  Dump   SLR   RIGHT  80   LEFT  100  IRENE  Limited 50% bilaterally without pain   GAIT flexion bias poor arm swing       EDUCATION/ NMREED   Posture sitting for work   Car       Minimize time   Decompression strategy    Lay down 5-10 min hourly    Ice for pain   Walk 10-20 min daily   No boot camp or exercises/activity that increases pain    Talk to MD about facet joint injection vs radiofrequency laser     Functional Outcome Score: BACK OSWESTRY 20%        Assessment & Plan     Assessment  Impairments: abnormal gait, abnormal or restricted ROM, activity intolerance, impaired balance, impaired physical strength, lacks appropriate home exercise program and pain with function  Functional Limitations: walking, uncomfortable " because of pain, standing and unable to perform repetitive tasks  Assessment details: Betty Garg is a 52 y.o. year-old female referred to physical therapy for LEFT LEFT 5 facet OA with radiculopathy and C 7 facet degeneration on MRI . She presents with a evolving clinical presentation.  She has comorbidities slight curvature of spine  and personal factors  Not abl able to sleep thru night over a year and has a very sitting job  that may affect her progress in the plan of care.  Signs and symptoms are consistent with physical therapy diagnosis of L5 and C7 facet pathology .   Prognosis: good    Goals  Plan Goals: STG: to be met by 6 weeks  1- Patient will report back and neck pain < 2 /10 with light household activities  2-Patient will increase SLR to  LEFT = RIGHT  without compensation or exacerbation   3-Patient will be independent with HEP without compensation or exacerbation   LTG: to be met by 12 weeks  1-Pt will report back and neck pain < 2 /10 with recreational activities   2-Pt will increase IRENE to WNL bilaterally  without compensation or exacerbation   3-Patient will increase strength 5 /5 to perform SINGLE LEG STANCE/ closed chain activities without compensation or dump   4-Pt will be independent with comprehsive HEP     Plan  Therapy options: will be seen for skilled therapy services  Planned modality interventions: ultrasound  Planned therapy interventions: transfer training, therapeutic activities, stretching, strengthening, postural training, neuromuscular re-education, home exercise program, gait training, body mechanics training and manual therapy  Other planned therapy interventions: Aquatic therapy  Frequency: 2x week  Duration in weeks: 12  Treatment plan discussed with: patient (Diagnosis and plan of care)  Plan details: DURATION in visits 36        Timed:  Manual Therapy:    0     mins  57914;  Therapeutic Exercise:    0     mins  45534;     Neuromuscular Abraham:    0    mins  43133;     Therapeutic Activity:     23     mins  97153;     Gait Trainin     mins  74843;     Ultrasound:     0     mins  87424;    Iontophoresis    0     mins 90361      Untimed:  Electrical Stimulation:    0     mins  46779 ( );  Traction:  0     mins  35765;   Low Eval     0     Mins  01495  Mod Eval     10     Mins  86096  High Eval                       0     Mins  42023  Dry Needling  (1-2 muscles)            0     mins 36263 (Self-pay)  Dry Needling (3-4 muscles) 0     mins 57422 (Self-pay)  Dry Needling Trial    0     mins DRYNDLTRIAL  (No Charge)      Timed Treatment:   23   mins   Total Treatment:     43   mins    PT SIGNATURE: Martha Naidu PT     [place] License Number: [id number]    Electronically signed by Martha Naidu PT, 23, 3:25 PM EDT    DATE TREATMENT INITIATED: 2023    Initial Certification  Certification Period: 2023  I certify that the therapy services are furnished while this patient is under my care.  The services outlined above are required by this patient, and will be reviewed every 90 days.     PHYSICIAN: Ayde Tom APRN   NPI: [id number]                                         DATE:     Please sign and return via fax to [phone] Thank you, [facility] Physical Therapy.

## 2023-07-27 RX ORDER — FLECAINIDE ACETATE 50 MG/1
50 TABLET ORAL 3 TIMES DAILY
Qty: 90 TABLET | Refills: 6 | Status: SHIPPED | OUTPATIENT
Start: 2023-07-27

## 2023-07-28 ENCOUNTER — TREATMENT (OUTPATIENT)
Dept: PHYSICAL THERAPY | Facility: CLINIC | Age: 53
End: 2023-07-28
Payer: COMMERCIAL

## 2023-07-28 DIAGNOSIS — M54.42 CHRONIC LEFT-SIDED LOW BACK PAIN WITH LEFT-SIDED SCIATICA: ICD-10-CM

## 2023-07-28 DIAGNOSIS — G89.29 CHRONIC LEFT-SIDED LOW BACK PAIN WITHOUT SCIATICA: ICD-10-CM

## 2023-07-28 DIAGNOSIS — M47.817 FACET ARTHRITIS, DEGENERATIVE, L5-S1 LEVEL, LUMBOSACRAL SPINE: Primary | ICD-10-CM

## 2023-07-28 DIAGNOSIS — M54.50 CHRONIC LEFT-SIDED LOW BACK PAIN WITHOUT SCIATICA: ICD-10-CM

## 2023-07-28 DIAGNOSIS — M53.3 SI (SACROILIAC) JOINT DYSFUNCTION: ICD-10-CM

## 2023-07-28 DIAGNOSIS — G89.29 CHRONIC LEFT-SIDED LOW BACK PAIN WITH LEFT-SIDED SCIATICA: ICD-10-CM

## 2023-07-28 DIAGNOSIS — R29.3 POSTURE IMBALANCE: ICD-10-CM

## 2023-07-28 PROCEDURE — 97110 THERAPEUTIC EXERCISES: CPT | Performed by: PHYSICAL THERAPIST

## 2023-07-28 PROCEDURE — 97530 THERAPEUTIC ACTIVITIES: CPT | Performed by: PHYSICAL THERAPIST

## 2023-07-28 PROCEDURE — 97112 NEUROMUSCULAR REEDUCATION: CPT | Performed by: PHYSICAL THERAPIST

## 2023-07-28 NOTE — PROGRESS NOTES
30-Day / 10-Visit Progress Note       [facility]  [address] [place] [address]  [phone] (phone)  [phone] (fax)    Patient: Betty Garg   : 1970  Diagnosis/ICD-10 Code:  Facet arthritis, degenerative, L5-S1 level, lumbosacral spine [M47.817]  Referring practitioner: MEGHA Ortiz  Date of Initial Visit: Type: THERAPY  Noted: 2023  Today's Date: 2023  Patient seen for 2 sessions      ICD-10-CM ICD-9-CM   1. Facet arthritis, degenerative, L5-S1 level, lumbosacral spine  M47.817 721.3   2. Posture imbalance  R29.3 729.90   3. Chronic left-sided low back pain with left-sided sciatica  M54.42 724.2    G89.29 724.3     338.29   4. SI (sacroiliac) joint dysfunction  M53.3 724.6   5. Chronic left-sided low back pain without sciatica  M54.50 724.2    G89.29 338.29         Subjective:     Clinical Progress: unchanged  Home Program Compliance: Yes  Treatment has included:  therapeutic exercise, manual therapy, therapeutic activity, neuro-muscular retraining , gait training, and patient education with home exercise program     Subjective walk going the gym  did not do weight for several weeks helped some but not much  Always kind of the same   Tried acupuncture and tried cupping actually helped a little      PAIN is always a 4-5/10 range if not walking or going to gym then down to 3/10 but always there   Can NOT sleep on LEFT shift     LEFT hip SI and LEFT scapular area     Objective     LUMBAR AROM WNL  SINGLE LEG STANCE l20 sec bilaterally but significant compensation on RIGHT with noted trunk lateral shift RIGHT   IRENE stiff 25% bilaterally with significant trunk rotation compensation at end range     EDUCATION and training for   Work ergonomics    Sit up tall with both feet on the floor   Pelvis in neutral with lumbar lordosis intact   Decompression break laying down or getting out of car hourly   Stretch on half hour   May want to  "try a stand up desk  May need to look at different chairs for more support     Wear back brace when walking and at gym to minimize exacerbation       SINGLE LEG STANCE work    Practice 1 leg balance on RIGHT    Hold on    Shift hips to RIGHT to find neutral    Open thoracic spine    Set core    Then let go to practice balance    EDUCATION as to needs more hip activation     HEAT  shower  ASSISTED pretzel stretch with    P1  15-20 sec   P2   15-20 sec   P3  15-20 sec    2 x each leg  ABS  \"smart bicycle\"  GOAL 1 min   SIDE LYING leg lift     Up and behind your leading with hell    GOAL is 2 sets of 15   SIDE LYING    transverse abominis[ the knee   tap the toe   ABS on so your back does not move     GAIT with more core activation for improved mechanics    Up tall    Add arms swing with focus on back     Ed as to need to increased hip mobility to stop compensation with spine and exacerbation facet and disc symptom        Functional Outcome Score: BACK OSWESTRY  30%    Assessment/Plan  A:  Patient has not been seen since initial eval      Goals  Plan Goals: STG: to be met by 6 weeks  1- Patient will report back and neck pain < 2 /10 with light household activities  ONGOING   2-Patient will increase SLR to  LEFT = RIGHT  without compensation or exacerbation   ONGOING   3-Patient will be independent with HEP without compensation or exacerbation   ONGOING   LTG: to be met by 12 weeks  1-Pt will report back and neck pain < 2 /10 with recreational activities   2-Pt will increase IRENE to WNL bilaterally  without compensation or exacerbation   3-Patient will increase strength 5 /5 to perform SINGLE LEG STANCE/ closed chain activities without compensation or dump   4-Pt will be independent with comprehsive HEP     PLAN reassess IRENE and RIGHT single limb stance and progress strengthening      Recommendations: Continue as planned  Timeframe: 3 months  Prognosis to achieve goals: good    PT Signature: Martha Naidu, " MARTI HARTMAN License Number: [id number]    Electronically signed by Martha Naidu, PT, 23, 9:31 AM EDT      Based upon review of the patient's progress and continued therapy plan, it is my medical opinion that Betty Garg should continue physical therapy treatment at [facility] PHYSICAL THERAPY  [address] [place] [address] [address] [place] [address]  [phone].    Signature: __________________________________  Ayde Tom APRN    Timed:  Manual Therapy:    0     mins  86644;  Therapeutic Exercise:    23     mins  62458;     Neuromuscular Abraham:    10    mins  85677;    Therapeutic Activity:     10     mins  62469;     Gait Trainin     mins  22584;     Ultrasound:     0     mins  78707;      Untimed:  Electrical Stimulation:    0     mins  65329 (MC );  Traction:    0     mins  64330;   Dry Needling  (1-2 muscles)            0     mins 60852 (Self-pay)  Dry Needling (3-4 muscles) 0     mins  (Self-pay)  Dry Needling Trial    0     mins DRYNDLTRIAL  (No Charge)      Timed Treatment:   43   mins   Total Treatment:     43   mins

## 2023-08-04 ENCOUNTER — TREATMENT (OUTPATIENT)
Dept: PHYSICAL THERAPY | Facility: CLINIC | Age: 53
End: 2023-08-04
Payer: COMMERCIAL

## 2023-08-04 DIAGNOSIS — M47.817 FACET ARTHRITIS, DEGENERATIVE, L5-S1 LEVEL, LUMBOSACRAL SPINE: Primary | ICD-10-CM

## 2023-08-04 DIAGNOSIS — M54.42 CHRONIC LEFT-SIDED LOW BACK PAIN WITH LEFT-SIDED SCIATICA: ICD-10-CM

## 2023-08-04 DIAGNOSIS — G89.29 CHRONIC LEFT-SIDED LOW BACK PAIN WITH LEFT-SIDED SCIATICA: ICD-10-CM

## 2023-08-04 DIAGNOSIS — R29.3 POSTURE IMBALANCE: ICD-10-CM

## 2023-08-04 PROCEDURE — 97110 THERAPEUTIC EXERCISES: CPT | Performed by: PHYSICAL THERAPIST

## 2023-08-04 PROCEDURE — 97530 THERAPEUTIC ACTIVITIES: CPT | Performed by: PHYSICAL THERAPIST

## 2023-08-11 ENCOUNTER — TREATMENT (OUTPATIENT)
Dept: PHYSICAL THERAPY | Facility: CLINIC | Age: 53
End: 2023-08-11
Payer: COMMERCIAL

## 2023-08-11 DIAGNOSIS — M47.817 FACET ARTHRITIS, DEGENERATIVE, L5-S1 LEVEL, LUMBOSACRAL SPINE: Primary | ICD-10-CM

## 2023-08-11 DIAGNOSIS — R29.3 POSTURE IMBALANCE: ICD-10-CM

## 2023-08-11 DIAGNOSIS — M53.3 SI (SACROILIAC) JOINT DYSFUNCTION: ICD-10-CM

## 2023-08-11 DIAGNOSIS — G89.29 CHRONIC LEFT-SIDED LOW BACK PAIN WITH LEFT-SIDED SCIATICA: ICD-10-CM

## 2023-08-11 DIAGNOSIS — M54.42 CHRONIC LEFT-SIDED LOW BACK PAIN WITH LEFT-SIDED SCIATICA: ICD-10-CM

## 2023-08-11 PROCEDURE — 97530 THERAPEUTIC ACTIVITIES: CPT | Performed by: PHYSICAL THERAPIST

## 2023-08-11 PROCEDURE — 97110 THERAPEUTIC EXERCISES: CPT | Performed by: PHYSICAL THERAPIST

## 2023-08-11 PROCEDURE — 97116 GAIT TRAINING THERAPY: CPT | Performed by: PHYSICAL THERAPIST

## 2023-08-11 NOTE — PROGRESS NOTES
"Physical Therapy Daily Treatment Note    [facility]  [address] [place] [address]  [phone] (phone)  [phone] (fax)    Patient: Betty Garg   : 1970  Diagnosis/ICD-10 Code:  Facet arthritis, degenerative, L5-S1 level, lumbosacral spine [M47.817]  Referring practitioner: MEGHA Ortiz  Today's Date: 2023  Patient seen for 4 sessions    Visit Diagnoses:    ICD-10-CM ICD-9-CM   1. Facet arthritis, degenerative, L5-S1 level, lumbosacral spine  M47.817 721.3   2. Posture imbalance  R29.3 729.90   3. Chronic left-sided low back pain with left-sided sciatica  M54.42 724.2    G89.29 724.3     338.29   4. SI (sacroiliac) joint dysfunction  M53.3 724.6              Subjective   Tried a  25 min tabata class yeterday   Pain this next 7/10 now     Objective     MAT work   Lower Trunk Rotation  Feet together x 10  Feet apart  x10   Pretzel stretch   PREP  30 sec x2;   stretch  30 sec x 2   ABS  DYNAMIC LUMBAR STABILIZATION     Towel behind head    Verbal cueing for proper technique    X 10  3 sets     SIDE LYING hip abd x 10 bilaterally    2 sets with verbal and tactile cueing for proper technique     BRIDGE     X10    Add band x10    Add marching x 10     3 sets with verbal cueing for proper technique / keeping pelivs stable  Trial of pigeon pose but too rigid in hips   Modified sitting pose x 30 sec x 3 bilaterally     WALKING with significant pelvic dumping    Hard heel striek    With hands on pelvic could feel a click every step     SYMPTOM MANAGEMENT   Wear brace when up this weekend    Decompression ok to do heat, mat exercise, then ice    If symptom < 5/10 than ok to do machines at gym 1 set of 10-15 in click free range   Answered questions about possible facet joint injections       Assessment/Plan  A: exacerbation after doing a 20 min tabata video with \"skater moves\"  Noted LEFT hip with over IR creating whip when walks and toe in  PLAN " reassess symptom and await to see if getting facet joint injections          Timed:    Manual Therapy:    0     mins  66783;  Therapeutic Exercise:    23     mins  64221;     Neuromuscular Abraham:    0    mins  88264;    Therapeutic Activity:     10     mins  60369;     Gait Training:      10     mins  28911;     Ultrasound:     0     mins  76398;    Aquatic Therapy    0     mins 43002;  Self Care                       0     mins   64911        Untimed:  Electrical Stimulation:    0     mins  44709 ( );  Traction:    0     mins  56479;   Dry Needling  (1-2 muscles)            0     mins 20560 (Self-pay)  Dry Needling (3-4 muscles) 0     20561 (Self-pay)  Dry Needling Trial    0     DRYNDLTRIAL  (No Charge)    Timed Treatment:   43   mins   Total Treatment:     43   mins    Martha Naidu PT  Physical Therapist    [place] License:[id number]

## 2023-08-23 ENCOUNTER — TREATMENT (OUTPATIENT)
Dept: PHYSICAL THERAPY | Facility: CLINIC | Age: 53
End: 2023-08-23
Payer: COMMERCIAL

## 2023-08-23 DIAGNOSIS — M54.42 CHRONIC LEFT-SIDED LOW BACK PAIN WITH LEFT-SIDED SCIATICA: ICD-10-CM

## 2023-08-23 DIAGNOSIS — G89.29 CHRONIC LEFT-SIDED LOW BACK PAIN WITH LEFT-SIDED SCIATICA: ICD-10-CM

## 2023-08-23 DIAGNOSIS — R29.3 POSTURE IMBALANCE: ICD-10-CM

## 2023-08-23 DIAGNOSIS — M47.817 FACET ARTHRITIS, DEGENERATIVE, L5-S1 LEVEL, LUMBOSACRAL SPINE: Primary | ICD-10-CM

## 2023-08-23 PROCEDURE — 97530 THERAPEUTIC ACTIVITIES: CPT | Performed by: PHYSICAL THERAPIST

## 2023-08-23 PROCEDURE — 97110 THERAPEUTIC EXERCISES: CPT | Performed by: PHYSICAL THERAPIST

## 2023-08-23 NOTE — PROGRESS NOTES
Physical Therapy Re-certification     [facility] Therapy [facility]  [address] [place] [address]  [phone] (phone)  [phone] (fax)      Patient: Betty Garg   : 1970  Diagnosis/ICD-10 Code:  Facet arthritis, degenerative, L5-S1 level, lumbosacral spine [M47.817]  Referring practitioner: MEGHA Ortiz  Date of Initial Visit: Type: THERAPY  Noted: 2023  Today's Date: 2023  Patient seen for 5 sessions    Visit Diagnoses:    ICD-10-CM ICD-9-CM   1. Facet arthritis, degenerative, L5-S1 level, lumbosacral spine  M47.817 721.3   2. Posture imbalance  R29.3 729.90   3. Chronic left-sided low back pain with left-sided sciatica  M54.42 724.2    G89.29 724.3     338.29         Subjective:     Clinical Progress: improved  Home Program Compliance: Yes  Treatment has included:  therapeutic exercise, manual therapy, therapeutic activity, neuro-muscular retraining , gait training, and patient education with home exercise program     Subjective  have nto been to the grym   Have been walking about 45 min and causes no pain  PAIN NOW  3/10  BEST  this week 2-3/10  WORST this [place] 3/10  Sleeping ok  AM very stiff and more sore and takes time to stretch and get moving because will get pain in scapular area too     Objective   POSTURE ED  Noted Sitting more upright  but LEFT scapular elevated and head on body shifted to RIGHT   Verbal cueing for neutral pelvis    Keep lumbar lordosis    Core activation    Open T spine for improved head on body     SLEEPING EDUCATION    Side with piloowbetween knees    Keep knees together   On back with pillows udner back       CERVICAL    Rotation LEFT 75% with scapular pain /soreness in uipper cervical and noted thoracic rotation to compensation    RIGHT  WNL without symptom   SIDE FLEXION   RIGHT  75%       LEFT  25% with pain stiff sore on RIGHT     Rotation stretch bilaterally with over press x 5 sec x 5  hourly   SIDE FLEXION   stretch bilaterally with over pressure SIDE FLEXION  and add flexion    X 5 sec x 5 each hourly     LUMBAR AROM WNL with pain/stiffness at end range     SINGLE LEG STANCE    RIGHT  10 sec with significant trunk shift to RIGHT   LEFT 10 sec with more wobble and noted hip hiking      MMT grssly 5/5     SLR  LEFT  100   RIGHT  80   IRENE limited 50% bilaterally    LEFT with noted pelvic rotation           Functional Outcome Score: BACK OSWESTRY 20%      NECK DISABILITY QUESTIONNAIRE  14%      Assessment/Plan    A: progressing with therex   Planning to get facet joint injection to calm down jont pain  Posture affected by scoliosis and lack of core strength   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 affecting closed chain activities     Goals  Plan Goals: STG: to be met by 6 weeks  1- Patient will report back and neck pain < 2 /10 with light household activities  ONGOING pain is intermittent but still 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   2-Patient will increase SLR to  LEFT = RIGHT  without compensation or exacerbation   ONGOING   RIGHT SLR still lacking compared to LEFT affecting ability to perform closed chain activities and walk community distances without exacerbation   3-Patient will be independent with HEP without compensation or exacerbation   MET   LTG: to be met by 12 weeks  1-Pt will report back and neck pain < 2 /10 with recreational activities   2-Pt will increase IRENE to WNL bilaterally  without compensation or exacerbation   3-Patient will increase strength 5 /5 to perform SINGLE LEG STANCE/ closed chain activities without compensation or dump   4-Pt will be independent with comprehsive HEP   5- Patient will have full cervical rotation and SIDE FLEXION   RIGHT = LEFT to improve safety/ ability to look each way when driving        Recommendations: Continue as planned  Timeframe: 3 months  Prognosis to achieve goals: good    PT Signature: Martha Naidu PT    [place] License Number: [id number]    Electronically  signed by Martha Naidu, PT, 23, 8:51 AM EDT      Based upon review of the patient's progress and continued therapy plan, it is my medical opinion that Betty Garg should continue physical therapy treatment at [facility] PHYSICAL THERAPY  [address] [place] [address] [address] [place] [address]  [phone]. office phone  [phone] office fax      Re-certification  Certification Period: 2023  I certify that the therapy services are furnished while this patient is under my care.  The services outlined above are required by this patient, and will be reviewed every 90 days.     PHYSICIAN: Ayde Tom APRN   NPI: [id number]                                         DATE:     Signature: __________________________________  Ayde Tom APRN      Please sign and return via fax to [phone]   Thank you, [facility] Physical Therapy.    Timed:  Manual Therapy:    0     mins  85842;  Therapeutic Exercise:    10   mins  57250;     Neuromuscular Abraham:    0    mins  83570;    Therapeutic Activity:     30    mins  32259;     Gait Trainin     mins  84174;     Ultrasound:     0     mins  44672;    Orthotic Mgmt/training  0     mins 64111;  Orthotic check out  0     mins 19090;  Aquatic Therapy    0     mins 73201;  Self Care                       0     mins   76938      Untimed:  Electrical Stimulation:    0     mins  56204 (MC );  Traction:    0     mins  77344;   Dry Needling  (1-2 muscles)            0     mins 22080 (Self-pay)  Dry Needling (3-4 muscles) 0     mins  (Self-pay)  Dry Needling Trial    0     mins DRYNDLTRIAL  (No Charge)  [place] Repositioning  0     mins 55163;    :  Timed Treatment:   40   mins   Total Treatment:     40   mins

## 2023-11-01 ENCOUNTER — OFFICE VISIT (OUTPATIENT)
Dept: CARDIOLOGY | Facility: CLINIC | Age: 53
End: 2023-11-01
Payer: COMMERCIAL

## 2023-11-01 VITALS
DIASTOLIC BLOOD PRESSURE: 80 MMHG | HEART RATE: 90 BPM | SYSTOLIC BLOOD PRESSURE: 138 MMHG | WEIGHT: 132.6 LBS | BODY MASS INDEX: 23.5 KG/M2 | OXYGEN SATURATION: 100 % | HEIGHT: 63 IN

## 2023-11-01 DIAGNOSIS — I48.0 PAROXYSMAL ATRIAL FIBRILLATION: Primary | ICD-10-CM

## 2023-11-01 DIAGNOSIS — I49.3 PVCS (PREMATURE VENTRICULAR CONTRACTIONS): ICD-10-CM

## 2023-11-01 PROCEDURE — 99213 OFFICE O/P EST LOW 20 MIN: CPT | Performed by: INTERNAL MEDICINE

## 2023-11-01 RX ORDER — FLUTICASONE PROPIONATE 44 UG/1
1 AEROSOL, METERED RESPIRATORY (INHALATION) AS NEEDED
COMMUNITY
Start: 2023-08-16

## 2023-11-01 RX ORDER — INHALER, ASSIST DEVICES
SPACER (EA) MISCELLANEOUS
COMMUNITY
Start: 2023-08-20

## 2023-11-01 RX ORDER — TRIAMCINOLONE ACETONIDE 55 UG/1
1 SPRAY, METERED NASAL DAILY
COMMUNITY
Start: 2023-08-17

## 2023-11-01 RX ORDER — FEXOFENADINE HCL 180 MG/1
180 TABLET ORAL AS NEEDED
COMMUNITY
Start: 2023-08-16

## 2023-11-01 RX ORDER — DIPHENOXYLATE HYDROCHLORIDE AND ATROPINE SULFATE 2.5; .025 MG/1; MG/1
1 TABLET ORAL DAILY
COMMUNITY

## 2023-11-01 NOTE — PROGRESS NOTES
Betty Garg  1970  [phone]    11/01/2023    [facility] CARDIOLOGY     Referring Provider: No ref. provider found     Job Jackson MD  [id number] [facility] [id number]    Chief Complaint   Patient presents with    Atrial Fibrillation       Problem List:   Paroxysmal Atrial Fibrillation:   CHADSVasc = 0   2 episodes of atrial fibrillation 8/2011 and 12/2012 with spontaneous conversion to normal sinus rhythm  Prn Rythmol  Normal echocardiogram 3/24/2016 with normal LVEF and no significant valvular heart disease  Normal GXT 7/28/2018 with heart rate reached at 189 bpm  Calcium artery score of 19.9 7/23/2018 12/20/2020 ER 30 days No AF, < 1% PAC/PVC  Paroxysmal SVT/PVCs  24 Hour Holter Monitor 7/31/19: NSR  bpm, average 80 bpm. Occasional PVCs. AT   Chest Pain Syndrome  Mild CAD calcification on CAC scan   Reported normal stress test 2018- data deficit  GERD  Anxiety  Surgical History:  None     Allergies  Allergies   Allergen Reactions    Erythromycin GI Intolerance    Metoprolol Unknown (See Comments)     Action Taken: cause hair loss;       HAIR LOSS /FATIGUE    Penicillins Hives       Current Medications    Current Outpatient Medications:     aspirin 81 MG tablet, Take 1 tablet by mouth Daily. oral delayed release tablet, Disp: , Rfl:     coenzyme Q10 100 MG capsule, Take 1 capsule by mouth Daily., Disp: , Rfl:     Dapsone 7.5 % gel, As Needed., Disp: , Rfl:     fexofenadine (ALLEGRA) 180 MG tablet, Take 1 tablet by mouth As Needed., Disp: , Rfl:     flecainide (TAMBOCOR) 50 MG tablet, Take 1 tablet by mouth 3 (Three) Times a Day., Disp: 90 tablet, Rfl: 6    fluticasone (FLOVENT HFA) 44 MCG/ACT inhaler, Inhale 1 puff As Needed., Disp: , Rfl:     multivitamin (THERAGRAN) tablet tablet, Take 1 tablet by mouth Daily., Disp: , Rfl:     multivitamin with minerals tablet tablet, Take 1 tablet by mouth Daily., Disp: , Rfl:     Nasacort Allergy 24HR 55 MCG/ACT nasal  "inhaler, 1 spray into the nostril(s) as directed by provider Daily., Disp: , Rfl:     omeprazole (priLOSEC) 40 MG capsule, Take 1 capsule by mouth Daily., Disp: , Rfl:     Probiotic Product (PROBIOTIC DAILY PO), Take 1 tablet by mouth Daily., Disp: , Rfl:     rosuvastatin (CRESTOR) 20 MG tablet, Take 1 tablet by mouth Daily., Disp: , Rfl:     SALINE MIST 0.65 % nasal spray, 1 spray into the nostril(s) as directed by provider As Needed., Disp: , Rfl:     Sodium Fluoride 5000 Sensitive 1.1-5 % gel, , Disp: , Rfl:     Spacer/Aero-Holding Chambers (OptiChamber Anai) misc, , Disp: , Rfl:     vitamin C (ASCORBIC ACID) 500 MG tablet, Take 1 tablet by mouth Daily., Disp: , Rfl:     Wheat Dextrin (BENEFIBER DRINK MIX PO), Take 1 bottle by mouth Daily., Disp: , Rfl:     Zinc 50 MG capsule, Take 50 mg by mouth Daily., Disp: , Rfl:     History of Present Illness:     Pt presents for follow up of PAF/SVT/PVC. Since we last saw the pt, pt denies any AF episodes. She has occasional PVCs usually triggered by her menstrual cycle. She denies SOB, CP, LH, and dizziness, syncope. Denies any hospitalizations, ER visits, bleeding, or TIA/CVA symptoms. Overall feels well. BP usually 110/69 mmHg at home. HRs usually in the 80s.           Vitals:    11/01/23 1134 11/01/23 1151   BP: 138/80    BP Location: Left arm    Patient Position: Sitting    Pulse: 114 90   SpO2: 100%    Weight: 60.1 kg (132 lb 9.6 oz)    Height: 160 cm (63\")      Body mass index is 23.49 kg/m².  PE:  General: NAD  Neck: no JVD, no carotid bruits, no TM  Heart RRR, NL S1, S2, S4 present, no rubs, murmurs  Lungs: CTA, no wheezes, rhonchi, or rales  Abd: soft, non-tender, NL BS  Ext: No musculoskeletal deformities, no edema, cyanosis, or clubbing  Psych: normal mood and affect    Diagnostic Data:        Procedures         1. Paroxysmal atrial fibrillation    2. PVCs (premature ventricular contractions)          Plan:    1) PAF: no recurrence on flecainide. QRS stable " on EKG today. Change flecainide 75 mg po BID  Continue present medications.   2) PVC: resolved on meds    F/up in 12 months    Electronically signed by KARL Pfeiffer, 11/01/23, 11:51 AM EDT.    I, Eirc Townsend MD, personally performed the services described in this documentation as scribed by the above named individual in my presence, and it is both accurate and complete.  11/1/2023  11:53 EDT

## 2023-12-12 ENCOUNTER — TELEPHONE (OUTPATIENT)
Dept: FAMILY MEDICINE CLINIC | Facility: CLINIC | Age: 53
End: 2023-12-12
Payer: COMMERCIAL

## 2023-12-12 NOTE — TELEPHONE ENCOUNTER
Caller: Betty Garg    Relationship to patient: Self    Best call back number: [phone]      Patient is needing: PATIENT REQUESTS LAB ORDERS BE PUT IN PRIOR TO HER PHYSICAL ON 3/21/24.  PLEASE CALL HER TO SCHEDULE LAB APPOINTMENT.    PLEASE ADVISE.

## 2024-03-05 RX ORDER — FLECAINIDE ACETATE 50 MG/1
75 TABLET ORAL 2 TIMES DAILY
Qty: 90 TABLET | Refills: 9 | Status: SHIPPED | OUTPATIENT
Start: 2024-03-05

## 2024-03-21 ENCOUNTER — OFFICE VISIT (OUTPATIENT)
Dept: FAMILY MEDICINE CLINIC | Facility: CLINIC | Age: 54
End: 2024-03-21
Payer: COMMERCIAL

## 2024-03-21 VITALS
HEIGHT: 63 IN | WEIGHT: 134 LBS | RESPIRATION RATE: 16 BRPM | SYSTOLIC BLOOD PRESSURE: 130 MMHG | OXYGEN SATURATION: 99 % | HEART RATE: 98 BPM | BODY MASS INDEX: 23.74 KG/M2 | DIASTOLIC BLOOD PRESSURE: 82 MMHG

## 2024-03-21 DIAGNOSIS — E78.2 MIXED HYPERLIPIDEMIA: ICD-10-CM

## 2024-03-21 DIAGNOSIS — R42 DIZZINESS: ICD-10-CM

## 2024-03-21 DIAGNOSIS — H69.93 EUSTACHIAN TUBE DYSFUNCTION, BILATERAL: ICD-10-CM

## 2024-03-21 DIAGNOSIS — Z00.00 PREVENTATIVE HEALTH CARE: Primary | ICD-10-CM

## 2024-03-21 PROBLEM — J30.9 ALLERGIC RHINITIS: Status: ACTIVE | Noted: 2018-05-03

## 2024-03-21 PROBLEM — Z82.49 FAMILY HISTORY OF ISCHEMIC HEART DISEASE (IHD): Status: ACTIVE | Noted: 2023-11-07

## 2024-03-21 PROBLEM — E03.9 HYPOTHYROIDISM: Status: ACTIVE | Noted: 2017-12-06

## 2024-03-21 PROCEDURE — 99396 PREV VISIT EST AGE 40-64: CPT | Performed by: NURSE PRACTITIONER

## 2024-03-21 RX ORDER — UBIDECARENONE 100 MG
100 CAPSULE ORAL DAILY
Start: 2024-03-21

## 2024-03-21 NOTE — PROGRESS NOTES
Subjective   Betty Garg is a 53 y.o. female.   Patient here for annual physical exam, labs, chronic illness management and updated screening.      History of Present Illness   Dr Jackson pt, new to this provider.     Acute dizziness in past 2 months. Was sick with the flu in Jan and Covid last month. She denies syncope. This is worse w bending over or working out. She denies room spinning. She wonders if inner ear is issue since back to back viruses. Hgb 13.6. She does have history of afib. She is on flecanide and asa. No afib or abnormal HR with dizziness, she monitors this on Credporttch.     History of Vitamin D def. Last Vitamin D 38. She has supplemented in the past.     Chronic Mixed HLD, sees cardio. had been on rosuvastatin 20 mg x 5 years. Stopped this is October after normal calcium score. Dad had early MI. Now , HDL 63, T chol 203. She has since restarted statin. Minimal side effects. She takes benefiber. She has healthy diet and exercises most days.     The following portions of the patient's history were reviewed and updated as appropriate: allergies, current medications, past family history, past medical history, past social history, past surgical history and problem list.    Review of Systems   Constitutional:  Negative for activity change, fatigue, unexpected weight gain and unexpected weight loss.   HENT:  Positive for congestion and ear pain. Negative for postnasal drip.         Dental exam is utd      Eyes:  Negative for blurred vision, double vision and visual disturbance.        Eye exam is utd      Respiratory:  Negative for cough, chest tightness, shortness of breath and wheezing.    Cardiovascular:  Negative for chest pain, palpitations and leg swelling.   Gastrointestinal:  Positive for constipation. Negative for abdominal pain, blood in stool, diarrhea and GERD.   Endocrine: Negative for cold intolerance, heat intolerance, polydipsia, polyphagia and polyuria.   Genitourinary:   Negative for breast lump, breast pain, dysuria, frequency and menstrual problem.   Musculoskeletal:  Positive for back pain. Negative for arthralgias.   Skin:  Negative for rash.        Sees derm      Allergic/Immunologic: Positive for environmental allergies.   Neurological:  Positive for dizziness. Negative for syncope, weakness and headache.   Hematological:  Negative for adenopathy. Does not bruise/bleed easily.   Psychiatric/Behavioral:  Positive for sleep disturbance and stress. Negative for suicidal ideas and depressed mood. The patient is not nervous/anxious.          Current Outpatient Medications:     aspirin 81 MG tablet, Take 1 tablet by mouth Daily. oral delayed release tablet, Disp: , Rfl:     coenzyme Q10 100 MG capsule, Take 1 capsule by mouth Daily., Disp: , Rfl:     Dapsone 7.5 % gel, As Needed., Disp: , Rfl:     fexofenadine (ALLEGRA) 180 MG tablet, Take 1 tablet by mouth As Needed., Disp: , Rfl:     flecainide (TAMBOCOR) 50 MG tablet, Take 1.5 tablets by mouth 2 (Two) Times a Day., Disp: 90 tablet, Rfl: 9    fluticasone (FLOVENT HFA) 44 MCG/ACT inhaler, Inhale 1 puff As Needed., Disp: , Rfl:     multivitamin (THERAGRAN) tablet tablet, Take 1 tablet by mouth Daily., Disp: , Rfl:     multivitamin with minerals tablet tablet, Take 1 tablet by mouth Daily., Disp: , Rfl:     Nasacort Allergy 24HR 55 MCG/ACT nasal inhaler, 1 spray into the nostril(s) as directed by provider Daily., Disp: , Rfl:     omeprazole (priLOSEC) 40 MG capsule, Take 1 capsule by mouth Daily., Disp: , Rfl:     Probiotic Product (PROBIOTIC DAILY PO), Take 1 tablet by mouth Daily., Disp: , Rfl:     rosuvastatin (CRESTOR) 20 MG tablet, Take 1 tablet by mouth Daily., Disp: , Rfl:     Spacer/Aero-Holding Chambers (OptiChamber Anai) misc, , Disp: , Rfl:     Wheat Dextrin (BENEFIBER DRINK MIX PO), Take 1 bottle by mouth Daily., Disp: , Rfl:     Objective   Physical Exam  Vitals reviewed.   Constitutional:       Appearance: Normal  appearance. She is normal weight.   HENT:      Head: Normocephalic.      Right Ear: Tympanic membrane is bulging.      Left Ear: Tympanic membrane is bulging.      Nose: Nose normal.      Mouth/Throat:      Mouth: Mucous membranes are moist.   Eyes:      Pupils: Pupils are equal, round, and reactive to light.   Cardiovascular:      Rate and Rhythm: Normal rate and regular rhythm.      Pulses: Normal pulses.      Heart sounds: Normal heart sounds.   Pulmonary:      Effort: Pulmonary effort is normal.      Breath sounds: Normal breath sounds.   Abdominal:      General: Abdomen is flat. Bowel sounds are normal.      Palpations: Abdomen is soft.   Musculoskeletal:         General: Normal range of motion.      Cervical back: Normal range of motion.   Skin:     General: Skin is warm.   Neurological:      General: No focal deficit present.      Mental Status: She is alert.   Psychiatric:         Mood and Affect: Mood normal.         Vitals:    03/21/24 0756   BP: 130/82   Pulse: 98   Resp: 16   SpO2: 99%     Body mass index is 23.74 kg/m².    Procedures    TSH   Date Value Ref Range Status   12/08/2020 2.570 0.470 - 4.680 u(iU)/mL Final     Comment:     (note)  Higher dose biotin supplements may interfere with this test.  Interpret results within the context of patient's clinical picture.       Assessment & Plan   Problems Addressed this Visit       Mixed hyperlipidemia     Other Visit Diagnoses       Preventative health care    -  Primary    Eustachian tube dysfunction, bilateral        Dizziness              Diagnoses         Codes Comments    Preventative health care    -  Primary ICD-10-CM: Z00.00  ICD-9-CM: V70.0     Mixed hyperlipidemia     ICD-10-CM: E78.2  ICD-9-CM: 272.2     Eustachian tube dysfunction, bilateral     ICD-10-CM: H69.93  ICD-9-CM: 381.81     Dizziness     ICD-10-CM: R42  ICD-9-CM: 780.4             Preventative care- Follow heart healthy diet, drink water, walk daily. Wear seatbelts, wear helmets,  wear sunscreens. Follow CDC guidelines for covid and flu .     Eustachian tube dysfunction/dizziness- use flonase, daily antihistamine    History of afib/ HLD- cw statin and heart meds, follow w cardio         Education provided in AVS   Return in about 1 year (around 3/21/2025) for Annual physical.

## 2024-03-27 ENCOUNTER — OFFICE (OUTPATIENT)
Dept: URBAN - METROPOLITAN AREA CLINIC 76 | Facility: CLINIC | Age: 54
End: 2024-03-27

## 2024-03-27 VITALS
OXYGEN SATURATION: 98 % | HEIGHT: 64 IN | SYSTOLIC BLOOD PRESSURE: 118 MMHG | HEART RATE: 81 BPM | WEIGHT: 134 LBS | DIASTOLIC BLOOD PRESSURE: 70 MMHG

## 2024-03-27 DIAGNOSIS — K21.9 GASTRO-ESOPHAGEAL REFLUX DISEASE WITHOUT ESOPHAGITIS: ICD-10-CM

## 2024-03-27 DIAGNOSIS — K57.92 DIVERTICULITIS OF INTESTINE, PART UNSPECIFIED, WITHOUT PERFO: ICD-10-CM

## 2024-03-27 DIAGNOSIS — R10.31 RIGHT LOWER QUADRANT PAIN: ICD-10-CM

## 2024-03-27 DIAGNOSIS — K59.00 CONSTIPATION, UNSPECIFIED: ICD-10-CM

## 2024-03-27 DIAGNOSIS — Z83.719 FAMILY HISTORY OF COLON POLYPS, UNSPECIFIED: ICD-10-CM

## 2024-03-27 PROCEDURE — 99214 OFFICE O/P EST MOD 30 MIN: CPT | Performed by: INTERNAL MEDICINE

## 2024-03-27 RX ORDER — POLYETHYLENE GLYCOL 3350 17 G/17G
17 POWDER, FOR SOLUTION ORAL
Qty: 1 | Refills: 3 | Status: ACTIVE
Start: 2024-03-27

## 2024-03-27 RX ORDER — OMEPRAZOLE 20 MG/1
CAPSULE, DELAYED RELEASE ORAL
Qty: 30 | Refills: 3 | Status: ACTIVE

## 2024-08-26 ENCOUNTER — OFFICE VISIT (OUTPATIENT)
Dept: FAMILY MEDICINE CLINIC | Facility: CLINIC | Age: 54
End: 2024-08-26
Payer: COMMERCIAL

## 2024-08-26 VITALS
DIASTOLIC BLOOD PRESSURE: 76 MMHG | BODY MASS INDEX: 23.74 KG/M2 | HEIGHT: 63 IN | OXYGEN SATURATION: 100 % | RESPIRATION RATE: 18 BRPM | SYSTOLIC BLOOD PRESSURE: 102 MMHG | WEIGHT: 134 LBS | HEART RATE: 75 BPM

## 2024-08-26 DIAGNOSIS — M25.572 LEFT LATERAL ANKLE PAIN: Primary | ICD-10-CM

## 2024-08-26 DIAGNOSIS — M25.531 WRIST PAIN, CHRONIC, RIGHT: ICD-10-CM

## 2024-08-26 DIAGNOSIS — G89.29 WRIST PAIN, CHRONIC, RIGHT: ICD-10-CM

## 2024-08-26 PROCEDURE — 99213 OFFICE O/P EST LOW 20 MIN: CPT | Performed by: NURSE PRACTITIONER

## 2024-08-26 RX ORDER — ALPRAZOLAM 0.25 MG
TABLET ORAL
COMMUNITY

## 2024-08-26 RX ORDER — LORATADINE 10 MG/1
TABLET ORAL
COMMUNITY
Start: 2024-06-11

## 2024-08-26 NOTE — PROGRESS NOTES
Subjective   Betty Garg is a 54 y.o. female.     History of Present Illness    Dr corona pt, new to provider    Acute on Chronic L ankle pain x years. More swelling and bothering her x last few months She reports ankle started hurting and messed up during boot camp in 2020. Urgent care xray was normal. Saw foot and ankle specialist [facility]  Dr Cruz. and had MRI done. Slight tear, wore brace. Never fully resolved. She did not want to see this specialist for ankle surgery. She is walking, going to the gym and exercising regularly. This does swell after a lot of activity.     Acute on chronic R wrist pain 3+ months. Worse after golfing in May and club hit the ground jarring her wrist. Since this injury wrist gets sore with stirring food or certain movements when cleaning  hurt wrist.     The following portions of the patient's history were reviewed and updated as appropriate: allergies, current medications, past family history, past medical history, past social history, past surgical history and problem list.    Review of Systems      Current Outpatient Medications:     aspirin 81 MG tablet, Take 1 tablet by mouth Daily. oral delayed release tablet, Disp: , Rfl:     coenzyme Q10 100 MG capsule, Take 1 capsule by mouth Daily., Disp: , Rfl:     Dapsone 7.5 % gel, As Needed., Disp: , Rfl:     fexofenadine (ALLEGRA) 180 MG tablet, Take 1 tablet by mouth As Needed., Disp: , Rfl:     flecainide (TAMBOCOR) 50 MG tablet, Take 1.5 tablets by mouth 2 (Two) Times a Day., Disp: 90 tablet, Rfl: 9    fluticasone (FLOVENT HFA) 44 MCG/ACT inhaler, Inhale 1 puff As Needed., Disp: , Rfl:     loratadine (CLARITIN) 10 MG tablet, , Disp: , Rfl:     multivitamin with minerals tablet tablet, Take 1 tablet by mouth Daily., Disp: , Rfl:     Nasacort Allergy 24HR 55 MCG/ACT nasal inhaler, 1 spray into the nostril(s) as directed by provider Daily., Disp: , Rfl:     omeprazole (priLOSEC) 40 MG capsule, Take 1 capsule by mouth  Daily., Disp: , Rfl:     Probiotic Product (PROBIOTIC DAILY PO), Take 1 tablet by mouth Daily., Disp: , Rfl:     rosuvastatin (CRESTOR) 20 MG tablet, Take 1 tablet by mouth Daily., Disp: , Rfl:     Spacer/Aero-Holding Chambers (OptiChamber Anai) misc, , Disp: , Rfl:     Wheat Dextrin (BENEFIBER DRINK MIX PO), Take 1 bottle by mouth Daily., Disp: , Rfl:     ALPRAZolam (Xanax) 0.25 MG tablet, tablet (Patient not taking: Reported on 8/26/2024), Disp: , Rfl:     multivitamin (THERAGRAN) tablet tablet, Take 1 tablet by mouth Daily. (Patient not taking: Reported on 8/26/2024), Disp: , Rfl:     Objective   Physical Exam  Constitutional:       Appearance: Normal appearance.   HENT:      Mouth/Throat:      Mouth: Mucous membranes are moist.   Cardiovascular:      Rate and Rhythm: Normal rate and regular rhythm.   Musculoskeletal:         General: Tenderness present. Normal range of motion.      Right wrist: Normal. Tenderness present. No bony tenderness.        Arms:         Feet:    Skin:     General: Skin is warm.   Neurological:      General: No focal deficit present.      Mental Status: She is alert.         Vitals:    08/26/24 1032   BP: 102/76   Pulse: 75   Resp: 18   SpO2: 100%     Body mass index is 23.74 kg/m².    Procedures    TSH   Date Value Ref Range Status   12/08/2020 2.570 0.470 - 4.680 u(iU)/mL Final     Comment:     (note)  Higher dose biotin supplements may interfere with this test.  Interpret results within the context of patient's clinical picture.                   Assessment & Plan   Problems Addressed this Visit    None  Visit Diagnoses       Left lateral ankle pain    -  Primary    Relevant Orders    Ambulatory Referral to Podiatry (Completed)    Wrist pain, chronic, right        Relevant Orders    Ambulatory Referral to Hand Surgery (Completed)          Diagnoses         Codes Comments    Left lateral ankle pain    -  Primary ICD-10-CM: M25.572  ICD-9-CM: 719.47     Wrist pain, chronic, right      ICD-10-CM: M25.531, G89.29  ICD-9-CM: 719.43, 338.29                    Education provided in AVS   No follow-ups on file.

## 2024-09-13 ENCOUNTER — OFFICE (AMBULATORY)
Age: 54
End: 2024-09-13

## 2024-09-13 ENCOUNTER — OFFICE (AMBULATORY)
Dept: URBAN - METROPOLITAN AREA CLINIC 76 | Facility: CLINIC | Age: 54
End: 2024-09-13

## 2024-09-13 VITALS
DIASTOLIC BLOOD PRESSURE: 80 MMHG | HEART RATE: 68 BPM | HEART RATE: 68 BPM | OXYGEN SATURATION: 98 % | HEART RATE: 68 BPM | SYSTOLIC BLOOD PRESSURE: 130 MMHG | OXYGEN SATURATION: 98 % | OXYGEN SATURATION: 98 % | HEIGHT: 64 IN | HEART RATE: 68 BPM | WEIGHT: 134 LBS | HEIGHT: 64 IN | DIASTOLIC BLOOD PRESSURE: 80 MMHG | HEIGHT: 64 IN | SYSTOLIC BLOOD PRESSURE: 130 MMHG | DIASTOLIC BLOOD PRESSURE: 80 MMHG | WEIGHT: 134 LBS | DIASTOLIC BLOOD PRESSURE: 80 MMHG | HEIGHT: 64 IN | WEIGHT: 134 LBS | SYSTOLIC BLOOD PRESSURE: 130 MMHG | SYSTOLIC BLOOD PRESSURE: 130 MMHG | SYSTOLIC BLOOD PRESSURE: 130 MMHG | HEIGHT: 64 IN | OXYGEN SATURATION: 98 % | WEIGHT: 134 LBS | WEIGHT: 134 LBS | WEIGHT: 134 LBS | HEART RATE: 68 BPM | OXYGEN SATURATION: 98 % | HEIGHT: 64 IN | DIASTOLIC BLOOD PRESSURE: 80 MMHG | SYSTOLIC BLOOD PRESSURE: 130 MMHG | DIASTOLIC BLOOD PRESSURE: 80 MMHG | OXYGEN SATURATION: 98 % | HEART RATE: 68 BPM | SYSTOLIC BLOOD PRESSURE: 130 MMHG | DIASTOLIC BLOOD PRESSURE: 80 MMHG | HEART RATE: 68 BPM | WEIGHT: 134 LBS | HEIGHT: 64 IN | OXYGEN SATURATION: 98 %

## 2024-09-13 DIAGNOSIS — K57.30 DIVERTICULOSIS OF LARGE INTESTINE WITHOUT PERFORATION OR ABS: ICD-10-CM

## 2024-09-13 DIAGNOSIS — K58.1 IRRITABLE BOWEL SYNDROME WITH CONSTIPATION: ICD-10-CM

## 2024-09-13 DIAGNOSIS — K31.7 POLYP OF STOMACH AND DUODENUM: ICD-10-CM

## 2024-09-13 DIAGNOSIS — Z83.719 FAMILY HISTORY OF COLON POLYPS, UNSPECIFIED: ICD-10-CM

## 2024-09-13 DIAGNOSIS — R13.10 DYSPHAGIA, UNSPECIFIED: ICD-10-CM

## 2024-09-13 DIAGNOSIS — Z12.11 ENCOUNTER FOR SCREENING FOR MALIGNANT NEOPLASM OF COLON: ICD-10-CM

## 2024-09-13 PROCEDURE — 99214 OFFICE O/P EST MOD 30 MIN: CPT

## 2024-10-14 VITALS
RESPIRATION RATE: 14 BRPM | TEMPERATURE: 97 F | OXYGEN SATURATION: 95 % | HEART RATE: 80 BPM | DIASTOLIC BLOOD PRESSURE: 72 MMHG | RESPIRATION RATE: 17 BRPM | WEIGHT: 132 LBS | TEMPERATURE: 97 F | RESPIRATION RATE: 18 BRPM | SYSTOLIC BLOOD PRESSURE: 132 MMHG | OXYGEN SATURATION: 100 % | HEART RATE: 83 BPM | WEIGHT: 132 LBS | SYSTOLIC BLOOD PRESSURE: 134 MMHG | RESPIRATION RATE: 20 BRPM | SYSTOLIC BLOOD PRESSURE: 144 MMHG | DIASTOLIC BLOOD PRESSURE: 82 MMHG | SYSTOLIC BLOOD PRESSURE: 139 MMHG | DIASTOLIC BLOOD PRESSURE: 72 MMHG | HEART RATE: 115 BPM | RESPIRATION RATE: 17 BRPM | HEART RATE: 115 BPM | OXYGEN SATURATION: 76 % | RESPIRATION RATE: 12 BRPM | DIASTOLIC BLOOD PRESSURE: 68 MMHG | HEIGHT: 64 IN | HEART RATE: 115 BPM | DIASTOLIC BLOOD PRESSURE: 94 MMHG | HEART RATE: 88 BPM | RESPIRATION RATE: 18 BRPM | HEART RATE: 115 BPM | SYSTOLIC BLOOD PRESSURE: 118 MMHG | TEMPERATURE: 97.2 F | TEMPERATURE: 97 F | DIASTOLIC BLOOD PRESSURE: 78 MMHG | SYSTOLIC BLOOD PRESSURE: 113 MMHG | DIASTOLIC BLOOD PRESSURE: 68 MMHG | RESPIRATION RATE: 18 BRPM | DIASTOLIC BLOOD PRESSURE: 72 MMHG | RESPIRATION RATE: 20 BRPM | SYSTOLIC BLOOD PRESSURE: 127 MMHG | HEART RATE: 84 BPM | SYSTOLIC BLOOD PRESSURE: 113 MMHG | RESPIRATION RATE: 17 BRPM | HEART RATE: 80 BPM | HEIGHT: 64 IN | DIASTOLIC BLOOD PRESSURE: 78 MMHG | TEMPERATURE: 97 F | HEART RATE: 80 BPM | OXYGEN SATURATION: 100 % | DIASTOLIC BLOOD PRESSURE: 76 MMHG | HEART RATE: 84 BPM | HEART RATE: 103 BPM | SYSTOLIC BLOOD PRESSURE: 144 MMHG | SYSTOLIC BLOOD PRESSURE: 134 MMHG | HEIGHT: 64 IN | RESPIRATION RATE: 9 BRPM | DIASTOLIC BLOOD PRESSURE: 71 MMHG | HEART RATE: 84 BPM | WEIGHT: 132 LBS | DIASTOLIC BLOOD PRESSURE: 59 MMHG | DIASTOLIC BLOOD PRESSURE: 72 MMHG | WEIGHT: 132 LBS | SYSTOLIC BLOOD PRESSURE: 109 MMHG | TEMPERATURE: 97 F | SYSTOLIC BLOOD PRESSURE: 109 MMHG | SYSTOLIC BLOOD PRESSURE: 139 MMHG | SYSTOLIC BLOOD PRESSURE: 144 MMHG | OXYGEN SATURATION: 76 % | SYSTOLIC BLOOD PRESSURE: 139 MMHG | SYSTOLIC BLOOD PRESSURE: 99 MMHG | SYSTOLIC BLOOD PRESSURE: 118 MMHG | DIASTOLIC BLOOD PRESSURE: 68 MMHG | OXYGEN SATURATION: 100 % | WEIGHT: 132 LBS | HEART RATE: 88 BPM | DIASTOLIC BLOOD PRESSURE: 59 MMHG | SYSTOLIC BLOOD PRESSURE: 132 MMHG | DIASTOLIC BLOOD PRESSURE: 64 MMHG | DIASTOLIC BLOOD PRESSURE: 71 MMHG | SYSTOLIC BLOOD PRESSURE: 127 MMHG | HEART RATE: 87 BPM | HEART RATE: 115 BPM | OXYGEN SATURATION: 99 % | RESPIRATION RATE: 12 BRPM | SYSTOLIC BLOOD PRESSURE: 134 MMHG | DIASTOLIC BLOOD PRESSURE: 64 MMHG | RESPIRATION RATE: 22 BRPM | HEIGHT: 64 IN | HEART RATE: 103 BPM | RESPIRATION RATE: 9 BRPM | HEIGHT: 64 IN | DIASTOLIC BLOOD PRESSURE: 94 MMHG | DIASTOLIC BLOOD PRESSURE: 71 MMHG | SYSTOLIC BLOOD PRESSURE: 99 MMHG | RESPIRATION RATE: 9 BRPM | OXYGEN SATURATION: 99 % | DIASTOLIC BLOOD PRESSURE: 64 MMHG | HEART RATE: 87 BPM | OXYGEN SATURATION: 95 % | OXYGEN SATURATION: 98 % | SYSTOLIC BLOOD PRESSURE: 144 MMHG | OXYGEN SATURATION: 100 % | OXYGEN SATURATION: 76 % | DIASTOLIC BLOOD PRESSURE: 72 MMHG | RESPIRATION RATE: 17 BRPM | SYSTOLIC BLOOD PRESSURE: 139 MMHG | OXYGEN SATURATION: 97 % | OXYGEN SATURATION: 97 % | SYSTOLIC BLOOD PRESSURE: 134 MMHG | SYSTOLIC BLOOD PRESSURE: 109 MMHG | SYSTOLIC BLOOD PRESSURE: 118 MMHG | OXYGEN SATURATION: 95 % | DIASTOLIC BLOOD PRESSURE: 78 MMHG | OXYGEN SATURATION: 95 % | RESPIRATION RATE: 14 BRPM | HEART RATE: 86 BPM | SYSTOLIC BLOOD PRESSURE: 118 MMHG | TEMPERATURE: 97.2 F | OXYGEN SATURATION: 98 % | DIASTOLIC BLOOD PRESSURE: 59 MMHG | RESPIRATION RATE: 12 BRPM | SYSTOLIC BLOOD PRESSURE: 144 MMHG | RESPIRATION RATE: 12 BRPM | HEART RATE: 86 BPM | HEART RATE: 87 BPM | RESPIRATION RATE: 17 BRPM | TEMPERATURE: 97.2 F | DIASTOLIC BLOOD PRESSURE: 76 MMHG | SYSTOLIC BLOOD PRESSURE: 132 MMHG | HEART RATE: 86 BPM | HEART RATE: 80 BPM | RESPIRATION RATE: 14 BRPM | DIASTOLIC BLOOD PRESSURE: 78 MMHG | DIASTOLIC BLOOD PRESSURE: 76 MMHG | SYSTOLIC BLOOD PRESSURE: 99 MMHG | RESPIRATION RATE: 22 BRPM | RESPIRATION RATE: 22 BRPM | SYSTOLIC BLOOD PRESSURE: 109 MMHG | SYSTOLIC BLOOD PRESSURE: 134 MMHG | SYSTOLIC BLOOD PRESSURE: 118 MMHG | DIASTOLIC BLOOD PRESSURE: 72 MMHG | SYSTOLIC BLOOD PRESSURE: 139 MMHG | OXYGEN SATURATION: 98 % | RESPIRATION RATE: 14 BRPM | DIASTOLIC BLOOD PRESSURE: 82 MMHG | SYSTOLIC BLOOD PRESSURE: 118 MMHG | HEART RATE: 86 BPM | RESPIRATION RATE: 20 BRPM | OXYGEN SATURATION: 95 % | DIASTOLIC BLOOD PRESSURE: 59 MMHG | SYSTOLIC BLOOD PRESSURE: 99 MMHG | RESPIRATION RATE: 22 BRPM | HEART RATE: 83 BPM | DIASTOLIC BLOOD PRESSURE: 71 MMHG | HEART RATE: 83 BPM | OXYGEN SATURATION: 76 % | SYSTOLIC BLOOD PRESSURE: 139 MMHG | SYSTOLIC BLOOD PRESSURE: 113 MMHG | HEART RATE: 94 BPM | SYSTOLIC BLOOD PRESSURE: 109 MMHG | SYSTOLIC BLOOD PRESSURE: 127 MMHG | OXYGEN SATURATION: 98 % | OXYGEN SATURATION: 76 % | DIASTOLIC BLOOD PRESSURE: 59 MMHG | DIASTOLIC BLOOD PRESSURE: 59 MMHG | RESPIRATION RATE: 9 BRPM | WEIGHT: 132 LBS | TEMPERATURE: 97.2 F | SYSTOLIC BLOOD PRESSURE: 99 MMHG | HEART RATE: 80 BPM | HEART RATE: 86 BPM | OXYGEN SATURATION: 100 % | DIASTOLIC BLOOD PRESSURE: 78 MMHG | SYSTOLIC BLOOD PRESSURE: 109 MMHG | HEART RATE: 83 BPM | OXYGEN SATURATION: 98 % | HEART RATE: 88 BPM | OXYGEN SATURATION: 97 % | DIASTOLIC BLOOD PRESSURE: 82 MMHG | DIASTOLIC BLOOD PRESSURE: 68 MMHG | DIASTOLIC BLOOD PRESSURE: 82 MMHG | DIASTOLIC BLOOD PRESSURE: 71 MMHG | HEART RATE: 94 BPM | DIASTOLIC BLOOD PRESSURE: 68 MMHG | SYSTOLIC BLOOD PRESSURE: 132 MMHG | HEART RATE: 103 BPM | OXYGEN SATURATION: 98 % | HEART RATE: 103 BPM | DIASTOLIC BLOOD PRESSURE: 68 MMHG | TEMPERATURE: 97.2 F | DIASTOLIC BLOOD PRESSURE: 76 MMHG | OXYGEN SATURATION: 99 % | RESPIRATION RATE: 18 BRPM | RESPIRATION RATE: 9 BRPM | SYSTOLIC BLOOD PRESSURE: 113 MMHG | TEMPERATURE: 97 F | HEART RATE: 80 BPM | OXYGEN SATURATION: 99 % | DIASTOLIC BLOOD PRESSURE: 94 MMHG | OXYGEN SATURATION: 100 % | DIASTOLIC BLOOD PRESSURE: 64 MMHG | SYSTOLIC BLOOD PRESSURE: 113 MMHG | HEART RATE: 83 BPM | DIASTOLIC BLOOD PRESSURE: 71 MMHG | HEART RATE: 87 BPM | RESPIRATION RATE: 14 BRPM | WEIGHT: 132 LBS | DIASTOLIC BLOOD PRESSURE: 94 MMHG | DIASTOLIC BLOOD PRESSURE: 82 MMHG | RESPIRATION RATE: 20 BRPM | RESPIRATION RATE: 9 BRPM | RESPIRATION RATE: 22 BRPM | RESPIRATION RATE: 20 BRPM | SYSTOLIC BLOOD PRESSURE: 132 MMHG | DIASTOLIC BLOOD PRESSURE: 76 MMHG | HEART RATE: 88 BPM | RESPIRATION RATE: 9 BRPM | TEMPERATURE: 97.2 F | DIASTOLIC BLOOD PRESSURE: 94 MMHG | RESPIRATION RATE: 22 BRPM | HEART RATE: 86 BPM | DIASTOLIC BLOOD PRESSURE: 64 MMHG | HEART RATE: 80 BPM | OXYGEN SATURATION: 100 % | HEIGHT: 64 IN | HEART RATE: 103 BPM | OXYGEN SATURATION: 99 % | OXYGEN SATURATION: 99 % | OXYGEN SATURATION: 76 % | SYSTOLIC BLOOD PRESSURE: 99 MMHG | DIASTOLIC BLOOD PRESSURE: 76 MMHG | OXYGEN SATURATION: 97 % | DIASTOLIC BLOOD PRESSURE: 82 MMHG | RESPIRATION RATE: 12 BRPM | HEART RATE: 86 BPM | OXYGEN SATURATION: 98 % | HEIGHT: 64 IN | DIASTOLIC BLOOD PRESSURE: 64 MMHG | HEART RATE: 87 BPM | DIASTOLIC BLOOD PRESSURE: 59 MMHG | DIASTOLIC BLOOD PRESSURE: 71 MMHG | SYSTOLIC BLOOD PRESSURE: 144 MMHG | DIASTOLIC BLOOD PRESSURE: 78 MMHG | HEART RATE: 94 BPM | HEART RATE: 103 BPM | HEART RATE: 94 BPM | SYSTOLIC BLOOD PRESSURE: 118 MMHG | HEART RATE: 87 BPM | RESPIRATION RATE: 17 BRPM | SYSTOLIC BLOOD PRESSURE: 139 MMHG | RESPIRATION RATE: 17 BRPM | SYSTOLIC BLOOD PRESSURE: 99 MMHG | HEART RATE: 103 BPM | DIASTOLIC BLOOD PRESSURE: 68 MMHG | SYSTOLIC BLOOD PRESSURE: 127 MMHG | RESPIRATION RATE: 20 BRPM | RESPIRATION RATE: 12 BRPM | HEART RATE: 115 BPM | HEART RATE: 83 BPM | SYSTOLIC BLOOD PRESSURE: 134 MMHG | HEART RATE: 88 BPM | SYSTOLIC BLOOD PRESSURE: 127 MMHG | RESPIRATION RATE: 18 BRPM | RESPIRATION RATE: 12 BRPM | HEART RATE: 94 BPM | DIASTOLIC BLOOD PRESSURE: 76 MMHG | TEMPERATURE: 97 F | SYSTOLIC BLOOD PRESSURE: 132 MMHG | HEART RATE: 94 BPM | OXYGEN SATURATION: 97 % | DIASTOLIC BLOOD PRESSURE: 64 MMHG | HEART RATE: 88 BPM | HEART RATE: 83 BPM | HEART RATE: 84 BPM | RESPIRATION RATE: 14 BRPM | HEART RATE: 84 BPM | OXYGEN SATURATION: 99 % | OXYGEN SATURATION: 97 % | DIASTOLIC BLOOD PRESSURE: 72 MMHG | HEART RATE: 87 BPM | SYSTOLIC BLOOD PRESSURE: 132 MMHG | SYSTOLIC BLOOD PRESSURE: 127 MMHG | DIASTOLIC BLOOD PRESSURE: 78 MMHG | HEART RATE: 94 BPM | SYSTOLIC BLOOD PRESSURE: 127 MMHG | HEART RATE: 84 BPM | SYSTOLIC BLOOD PRESSURE: 113 MMHG | RESPIRATION RATE: 22 BRPM | HEART RATE: 88 BPM | SYSTOLIC BLOOD PRESSURE: 134 MMHG | OXYGEN SATURATION: 76 % | SYSTOLIC BLOOD PRESSURE: 144 MMHG | RESPIRATION RATE: 18 BRPM | RESPIRATION RATE: 18 BRPM | HEART RATE: 115 BPM | SYSTOLIC BLOOD PRESSURE: 109 MMHG | DIASTOLIC BLOOD PRESSURE: 94 MMHG | OXYGEN SATURATION: 95 % | RESPIRATION RATE: 14 BRPM | DIASTOLIC BLOOD PRESSURE: 82 MMHG | OXYGEN SATURATION: 97 % | TEMPERATURE: 97.2 F | DIASTOLIC BLOOD PRESSURE: 94 MMHG | OXYGEN SATURATION: 95 % | HEART RATE: 84 BPM | RESPIRATION RATE: 20 BRPM | SYSTOLIC BLOOD PRESSURE: 113 MMHG

## 2024-10-17 ENCOUNTER — OFFICE (AMBULATORY)
Dept: URBAN - METROPOLITAN AREA PATHOLOGY 4 | Facility: PATHOLOGY | Age: 54
End: 2024-10-17
Payer: COMMERCIAL

## 2024-10-17 ENCOUNTER — AMBULATORY SURGICAL CENTER (AMBULATORY)
Age: 54
End: 2024-10-17
Payer: COMMERCIAL

## 2024-10-17 ENCOUNTER — OFFICE (AMBULATORY)
Age: 54
End: 2024-10-17
Payer: COMMERCIAL

## 2024-10-17 ENCOUNTER — AMBULATORY SURGICAL CENTER (AMBULATORY)
Dept: URBAN - METROPOLITAN AREA SURGERY 17 | Facility: SURGERY | Age: 54
End: 2024-10-17
Payer: COMMERCIAL

## 2024-10-17 DIAGNOSIS — R49.0 DYSPHONIA: ICD-10-CM

## 2024-10-17 DIAGNOSIS — Z83.719 FAMILY HISTORY OF COLON POLYPS, UNSPECIFIED: ICD-10-CM

## 2024-10-17 DIAGNOSIS — Z12.11 ENCOUNTER FOR SCREENING FOR MALIGNANT NEOPLASM OF COLON: ICD-10-CM

## 2024-10-17 DIAGNOSIS — R13.10 DYSPHAGIA, UNSPECIFIED: ICD-10-CM

## 2024-10-17 DIAGNOSIS — K22.89 OTHER SPECIFIED DISEASE OF ESOPHAGUS: ICD-10-CM

## 2024-10-17 DIAGNOSIS — K57.30 DIVERTICULOSIS OF LARGE INTESTINE WITHOUT PERFORATION OR ABS: ICD-10-CM

## 2024-10-17 LAB
GI HISTOLOGY: A. DISTAL ESOPHAGUS: (no result)
GI HISTOLOGY: B. MID ESOPHAGUS: (no result)
GI HISTOLOGY: PDF REPORT: (no result)

## 2024-10-17 PROCEDURE — G0105 COLORECTAL SCRN; HI RISK IND: HCPCS | Performed by: INTERNAL MEDICINE

## 2024-10-17 PROCEDURE — 88305 TISSUE EXAM BY PATHOLOGIST: CPT | Performed by: PATHOLOGY

## 2024-10-17 PROCEDURE — 43249 ESOPH EGD DILATION <30 MM: CPT | Performed by: INTERNAL MEDICINE

## 2024-11-01 ENCOUNTER — OFFICE VISIT (OUTPATIENT)
Dept: FAMILY MEDICINE CLINIC | Facility: CLINIC | Age: 54
End: 2024-11-01
Payer: COMMERCIAL

## 2024-11-01 VITALS
RESPIRATION RATE: 18 BRPM | WEIGHT: 135.3 LBS | TEMPERATURE: 98.3 F | BODY MASS INDEX: 23.97 KG/M2 | OXYGEN SATURATION: 100 % | SYSTOLIC BLOOD PRESSURE: 112 MMHG | HEIGHT: 63 IN | HEART RATE: 71 BPM | DIASTOLIC BLOOD PRESSURE: 72 MMHG

## 2024-11-01 DIAGNOSIS — J45.909 ASTHMA DUE TO SEASONAL ALLERGIES: ICD-10-CM

## 2024-11-01 DIAGNOSIS — J40 BRONCHITIS: Primary | ICD-10-CM

## 2024-11-01 LAB
EXPIRATION DATE: NORMAL
EXPIRATION DATE: NORMAL
FLUAV AG UPPER RESP QL IA.RAPID: NOT DETECTED
FLUBV AG UPPER RESP QL IA.RAPID: NOT DETECTED
INTERNAL CONTROL: NORMAL
INTERNAL CONTROL: NORMAL
Lab: NORMAL
Lab: NORMAL
S PYO AG THROAT QL: NEGATIVE
SARS-COV-2 AG UPPER RESP QL IA.RAPID: NOT DETECTED

## 2024-11-01 RX ORDER — DEXTROMETHORPHAN HYDROBROMIDE AND PROMETHAZINE HYDROCHLORIDE 15; 6.25 MG/5ML; MG/5ML
5 SYRUP ORAL 4 TIMES DAILY PRN
Qty: 180 ML | Refills: 0 | Status: SHIPPED | OUTPATIENT
Start: 2024-11-01

## 2024-11-01 RX ORDER — TRIAMCINOLONE ACETONIDE 55 UG/1
1 SPRAY, METERED NASAL DAILY
Qty: 16.5 G | Refills: 3 | Status: SHIPPED | OUTPATIENT
Start: 2024-11-01

## 2024-11-01 NOTE — PROGRESS NOTES
Subjective   Betty Garg is a 54 y.o. female.     Sore Throat   Associated symptoms include coughing.   Cough  Associated symptoms include a sore throat.        The following portions of the patient's history were reviewed and updated as appropriate: allergies, current medications, past family history, past medical history, past social history, past surgical history and problem list.       The patient is a 54-year-old female who presents for evaluation of cough and congestion.    She began experiencing symptoms of a sore throat, body aches, and chills last Saturday, with a fever peaking at around 99 degrees. Her condition improved on Monday, but by Wednesday, she started to feel unwell again, developing a cough that produces thick, green phlegm. She reports no fever at this time, but her cough is severe enough to disrupt her sleep. Initially, she had nasal congestion, but it has since moved to her chest. She started experiencing chest tightness last night.  She has a history of bronchitis, which typically responds well to antibiotics. She uses an flovent inhaler as needed and has been diagnosed with seasonal or exercise-induced asthma by her allergist.   She was using Nasacort, which was effective, but she has run out. She does not regularly use a nasal saline rinse or Neti pot. She has Tessalon Perles at home for her cough.         Review of Systems   HENT:  Positive for sore throat.    Respiratory:  Positive for cough.            Current Outpatient Medications:     aspirin 81 MG tablet, Take 1 tablet by mouth Daily. oral delayed release tablet, Disp: , Rfl:     Dapsone 7.5 % gel, As Needed., Disp: , Rfl:     fexofenadine (ALLEGRA) 180 MG tablet, Take 1 tablet by mouth As Needed., Disp: , Rfl:     flecainide (TAMBOCOR) 50 MG tablet, Take 1.5 tablets by mouth 2 (Two) Times a Day., Disp: 90 tablet, Rfl: 9    fluticasone (FLOVENT HFA) 44 MCG/ACT inhaler, Inhale 1 puff As Needed., Disp: , Rfl:     loratadine  (CLARITIN) 10 MG tablet, , Disp: , Rfl:     multivitamin with minerals tablet tablet, Take 1 tablet by mouth Daily., Disp: , Rfl:     omeprazole (priLOSEC) 40 MG capsule, Take 1 capsule by mouth Daily., Disp: , Rfl:     Probiotic Product (PROBIOTIC DAILY PO), Take 1 tablet by mouth Daily., Disp: , Rfl:     rosuvastatin (CRESTOR) 20 MG tablet, Take 1 tablet by mouth Daily., Disp: , Rfl:     Spacer/Aero-Holding Chambers (OptiChamber Anai) misc, , Disp: , Rfl:     Wheat Dextrin (BENEFIBER DRINK MIX PO), Take 1 bottle by mouth Daily., Disp: , Rfl:     Nasacort Allergy 24HR 55 MCG/ACT nasal inhaler, 1 spray into the nostril(s) as directed by provider Daily. (Patient not taking: Reported on 11/1/2024), Disp: , Rfl:     Objective   Physical Exam  Vitals reviewed.   Constitutional:       General: She is not in acute distress.     Appearance: Normal appearance. She is not ill-appearing, toxic-appearing or diaphoretic.   HENT:      Right Ear: Tympanic membrane normal.      Left Ear: Tympanic membrane normal.      Nose: Nose normal.      Mouth/Throat:      Mouth: Mucous membranes are moist.   Eyes:      Pupils: Pupils are equal, round, and reactive to light.   Cardiovascular:      Rate and Rhythm: Normal rate and regular rhythm.      Pulses: Normal pulses.   Pulmonary:      Effort: Pulmonary effort is normal.      Breath sounds: No wheezing or rhonchi.   Musculoskeletal:      Cervical back: Normal range of motion.   Lymphadenopathy:      Cervical: No cervical adenopathy.   Skin:     General: Skin is warm.   Neurological:      General: No focal deficit present.      Mental Status: She is alert.         Vitals:    11/01/24 1046   BP: 112/72   Pulse: 71   Resp: 18   Temp: 98.3 °F (36.8 °C)   SpO2: 100%     Body mass index is 23.97 kg/m².    Procedures    TSH   Date Value Ref Range Status   12/08/2020 2.570 0.470 - 4.680 u(iU)/mL Final     Comment:     (note)  Higher dose biotin supplements may interfere with this  test.  Interpret results within the context of patient's clinical picture.                   Assessment & Plan   Problems Addressed this Visit    None  Visit Diagnoses       Bronchitis    -  Primary    Relevant Orders    POC Rapid Strep A (Completed)    Asthma due to seasonal allergies        Relevant Orders    POCT SARS-CoV-2 + Flu Antigen JOCELYN (Completed)          Diagnoses         Codes Comments    Bronchitis    -  Primary ICD-10-CM: J40  ICD-9-CM: 490     Asthma due to seasonal allergies     ICD-10-CM: J45.909  ICD-9-CM: 493.90           Negative flu and covid      1. Viral Upper Respiratory Infection.  Symptoms include cough, congestion, sore throat, and chills, with no fever currently. The lungs sound clear with no wheezing or rattling, suggesting no pneumonia or bacterial infection. The condition appears to be viral or possibly an allergic reaction superimposed on a viral infection. Nasacort will be renewed. Adequate hydration, rest, vitamin C, and zinc are recommended. Promethazine cough syrup will be provided for nighttime use. Tessalon can be taken up to three times daily. If symptoms persist after 10 days, an antibiotic will be considered.    2. Exercise-Induced Asthma.  Reports occasional tightness in the chest during physical activity, particularly in certain seasons. Flovent is used as needed. No current wheezing or need for albuterol. Continue current management with Flovent as needed.                Education provided in AVS   No follow-ups on file.    Patient or patient representative verbalized consent for the use of Ambient Listening during the visit with  MEGHA Macias for chart documentation. 11/1/2024  11:21 EDT

## 2024-11-06 ENCOUNTER — OFFICE VISIT (OUTPATIENT)
Dept: CARDIOLOGY | Facility: CLINIC | Age: 54
End: 2024-11-06
Payer: COMMERCIAL

## 2024-11-06 VITALS
WEIGHT: 132.2 LBS | BODY MASS INDEX: 23.42 KG/M2 | DIASTOLIC BLOOD PRESSURE: 92 MMHG | HEIGHT: 63 IN | HEART RATE: 93 BPM | OXYGEN SATURATION: 99 % | SYSTOLIC BLOOD PRESSURE: 122 MMHG

## 2024-11-06 DIAGNOSIS — I49.3 PVCS (PREMATURE VENTRICULAR CONTRACTIONS): ICD-10-CM

## 2024-11-06 DIAGNOSIS — I48.0 PAROXYSMAL ATRIAL FIBRILLATION: Primary | ICD-10-CM

## 2024-11-06 PROCEDURE — 93000 ELECTROCARDIOGRAM COMPLETE: CPT | Performed by: INTERNAL MEDICINE

## 2024-11-06 PROCEDURE — 99213 OFFICE O/P EST LOW 20 MIN: CPT | Performed by: INTERNAL MEDICINE

## 2024-11-06 NOTE — PROGRESS NOTES
Betty Garg  1970  [phone]    11/06/2024    [facility] CARDIOLOGY     Referring Provider: No ref. provider found     Job Jackson MD  [id number]  [facility] [id number]    Chief Complaint   Patient presents with    Paroxysmal atrial fibrillation       Problem List:   Paroxysmal Atrial Fibrillation:   CHADSVasc = 0   2 episodes of atrial fibrillation 8/2011 and 12/2012 with spontaneous conversion to normal sinus rhythm  Prn Rythmol  Normal echocardiogram 3/24/2016 with normal LVEF and no significant valvular heart disease  Normal GXT 7/28/2018 with heart rate reached at 189 bpm  Calcium artery score of 19.9 7/23/2018 12/20/2020 ER 30 days No AF, < 1% PAC/PVC  Paroxysmal SVT/PVCs  24 Hour Holter Monitor 7/31/19: NSR  bpm, average 80 bpm. Occasional PVCs. AT   Chest Pain Syndrome  Mild CAD calcification on CAC scan   Reported normal stress test 2018- data deficit  GERD  Anxiety  Surgical History:  None     Allergies  Allergies   Allergen Reactions    Erythromycin GI Intolerance    Gluten Meal GI Intolerance    Metoprolol Unknown (See Comments)     Action Taken: cause hair loss;       HAIR LOSS /FATIGUE    Penicillins Hives       Current Medications    Current Outpatient Medications:     aspirin 81 MG tablet, Take 1 tablet by mouth Daily. oral delayed release tablet, Disp: , Rfl:     Dapsone 7.5 % gel, As Needed., Disp: , Rfl:     fexofenadine (ALLEGRA) 180 MG tablet, Take 1 tablet by mouth As Needed., Disp: , Rfl:     flecainide (TAMBOCOR) 50 MG tablet, Take 1.5 tablets by mouth 2 (Two) Times a Day., Disp: 90 tablet, Rfl: 9    fluticasone (FLOVENT HFA) 44 MCG/ACT inhaler, Inhale 1 puff As Needed., Disp: , Rfl:     loratadine (CLARITIN) 10 MG tablet, , Disp: , Rfl:     multivitamin with minerals tablet tablet, Take 1 tablet by mouth Daily., Disp: , Rfl:     Nasacort Allergy 24HR 55 MCG/ACT nasal inhaler, Administer 1 spray into the nostril(s) as directed by provider  "Daily., Disp: 16.5 g, Rfl: 3    omeprazole (priLOSEC) 40 MG capsule, Take 1 capsule by mouth Daily., Disp: , Rfl:     Probiotic Product (PROBIOTIC DAILY PO), Take 1 tablet by mouth Daily., Disp: , Rfl:     rosuvastatin (CRESTOR) 20 MG tablet, Take 1 tablet by mouth Daily., Disp: , Rfl:     Spacer/Aero-Holding Chambers (OptiChamber Anai) misc, , Disp: , Rfl:     Wheat Dextrin (BENEFIBER DRINK MIX PO), Take 1 bottle by mouth Daily., Disp: , Rfl:     promethazine-dextromethorphan (PROMETHAZINE-DM) 6.25-15 MG/5ML syrup, Take 5 mL by mouth 4 (Four) Times a Day As Needed for Cough. (Patient not taking: Reported on 11/6/2024), Disp: 180 mL, Rfl: 0    History of Present Illness     Pt presents for follow up of PAF, SVT, PVC. Since we last saw the pt, pt denies any AF episodes, SOB, CP, LH, and dizziness. Denies any hospitalizations, ER visits, bleeding, or TIA/CVA symptoms. Overall feels well. BP stable at home.          Vitals:    11/06/24 1138   BP: 122/92   BP Location: Left arm   Patient Position: Sitting   Cuff Size: Adult   Pulse: 93   SpO2: 99%   Weight: 60 kg (132 lb 3.2 oz)   Height: 160 cm (62.99\")     Body mass index is 23.42 kg/m².  PE:  General: NAD  Neck: no JVD, no carotid bruits, no TM  Heart RRR, NL S1, S2, no rubs, murmurs  Lungs: CTA, no wheezes, rhonchi, or rales  Abd: soft, non-tender, NL BS  Ext: No musculoskeletal deformities, no edema, cyanosis, or clubbing  Psych: normal mood and affect    Diagnostic Data:        ECG 12 Lead    Date/Time: 11/6/2024 11:50 AM  Performed by: Eric Townsend MD    Authorized by: Eric Townsend MD  Comparison: compared with previous ECG from 11/2/2023  Similar to previous ECG  Rhythm: sinus rhythm  BPM: 90                 1. Paroxysmal atrial fibrillation    2. PVCs (premature ventricular contractions)          Plan:  1) PAF: no recurrence on flecainide. QRS stable on EKG today. Flecainide 75 mg po BID  Continue present medications.   2) PVC: resolved on " meds    F/up in 12 months

## 2024-12-17 ENCOUNTER — OFFICE (AMBULATORY)
Dept: URBAN - METROPOLITAN AREA CLINIC 76 | Facility: CLINIC | Age: 54
End: 2024-12-17

## 2024-12-17 VITALS
OXYGEN SATURATION: 98 % | SYSTOLIC BLOOD PRESSURE: 118 MMHG | HEIGHT: 64 IN | DIASTOLIC BLOOD PRESSURE: 78 MMHG | WEIGHT: 131 LBS | HEART RATE: 62 BPM

## 2024-12-17 DIAGNOSIS — K57.30 DIVERTICULOSIS OF LARGE INTESTINE WITHOUT PERFORATION OR ABS: ICD-10-CM

## 2024-12-17 DIAGNOSIS — R13.10 DYSPHAGIA, UNSPECIFIED: ICD-10-CM

## 2024-12-17 DIAGNOSIS — K21.9 GASTRO-ESOPHAGEAL REFLUX DISEASE WITHOUT ESOPHAGITIS: ICD-10-CM

## 2024-12-17 PROCEDURE — 99213 OFFICE O/P EST LOW 20 MIN: CPT

## 2025-01-30 RX ORDER — FLECAINIDE ACETATE 50 MG/1
TABLET ORAL
Qty: 90 TABLET | Refills: 9 | Status: SHIPPED | OUTPATIENT
Start: 2025-01-30

## 2025-02-11 ENCOUNTER — TELEPHONE (OUTPATIENT)
Dept: CARDIOLOGY | Facility: CLINIC | Age: 55
End: 2025-02-11
Payer: COMMERCIAL

## 2025-02-11 NOTE — TELEPHONE ENCOUNTER
Caller: Betty Garg    Relationship: Self    Best call back number: [phone]    Which medication are you concerned about: PT IS ABOUT TO START TAKING A SUPPLEMENT CALLER BERBERINE. SHE IS CONCERNED ABOUT TAKING THIS WITH HER FLECAINIDE. SHE WOULD LIKE  TO VERIFY THAT THE MEDICATIONS ARE SAFE TAKEN TOGETHER. PLEASE REACH OUT TO THE PT.     Who prescribed you this medication:      When did you start taking this medication: SHE HAS NOT STARTED THIS YET    What are your concerns: A POSSIBLE INTERACTION.

## 2025-02-11 NOTE — TELEPHONE ENCOUNTER
Patient will reach out to their pharmacist regarding possible interactions between her medications.

## 2025-06-11 ENCOUNTER — DOCUMENTATION (OUTPATIENT)
Dept: PHYSICAL THERAPY | Facility: CLINIC | Age: 55
End: 2025-06-11
Payer: COMMERCIAL

## 2025-08-05 ENCOUNTER — OFFICE VISIT (OUTPATIENT)
Dept: ORTHOPEDIC SURGERY | Facility: CLINIC | Age: 55
End: 2025-08-05
Payer: COMMERCIAL

## 2025-08-05 VITALS — HEIGHT: 64 IN | WEIGHT: 137 LBS | BODY MASS INDEX: 23.39 KG/M2 | TEMPERATURE: 97.8 F

## 2025-08-05 DIAGNOSIS — M25.552 LEFT HIP PAIN: ICD-10-CM

## 2025-08-05 DIAGNOSIS — M54.42 CHRONIC LEFT-SIDED LOW BACK PAIN WITH LEFT-SIDED SCIATICA: ICD-10-CM

## 2025-08-05 DIAGNOSIS — G89.29 CHRONIC LEFT-SIDED LOW BACK PAIN WITH LEFT-SIDED SCIATICA: ICD-10-CM

## 2025-08-05 DIAGNOSIS — R52 PAIN: Primary | ICD-10-CM

## 2025-08-11 ENCOUNTER — TELEPHONE (OUTPATIENT)
Dept: ORTHOPEDIC SURGERY | Facility: CLINIC | Age: 55
End: 2025-08-11
Payer: COMMERCIAL

## 2025-08-19 ENCOUNTER — RESULTS FOLLOW-UP (OUTPATIENT)
Dept: ORTHOPEDIC SURGERY | Facility: CLINIC | Age: 55
End: 2025-08-19
Payer: COMMERCIAL

## 2025-08-27 ENCOUNTER — OFFICE VISIT (OUTPATIENT)
Dept: ORTHOPEDIC SURGERY | Facility: CLINIC | Age: 55
End: 2025-08-27
Payer: COMMERCIAL

## 2025-08-27 VITALS — WEIGHT: 137.4 LBS | BODY MASS INDEX: 23.46 KG/M2 | HEIGHT: 64 IN | TEMPERATURE: 97.6 F

## 2025-08-27 DIAGNOSIS — M46.1 SACROILIITIS: Primary | ICD-10-CM

## 2025-08-27 PROCEDURE — 99213 OFFICE O/P EST LOW 20 MIN: CPT | Performed by: NURSE PRACTITIONER
